# Patient Record
Sex: FEMALE | Race: OTHER | Employment: OTHER | URBAN - METROPOLITAN AREA
[De-identification: names, ages, dates, MRNs, and addresses within clinical notes are randomized per-mention and may not be internally consistent; named-entity substitution may affect disease eponyms.]

---

## 2017-03-17 ENCOUNTER — HOSPITAL ENCOUNTER (OUTPATIENT)
Dept: LAB | Age: 72
Discharge: HOME | End: 2017-03-17
Payer: MEDICARE

## 2017-03-17 LAB
ALBUMIN SERPL BCP-MCNC: 3.4 G/DL (ref 3.4–5)
ALBUMIN/GLOB SERPL: 0.8 {RATIO} (ref 0.8–1.7)
ALP SERPL-CCNC: 90 U/L (ref 45–117)
ALT SERPL-CCNC: 21 U/L (ref 13–56)
ANION GAP BLD CALC-SCNC: 7 MMOL/L (ref 3–18)
AST SERPL W P-5'-P-CCNC: 13 U/L (ref 15–37)
BILIRUB SERPL-MCNC: 0.5 MG/DL (ref 0.2–1)
BUN SERPL-MCNC: 12 MG/DL (ref 7–18)
BUN/CREAT SERPL: 15 (ref 12–20)
CALCIUM SERPL-MCNC: 8.8 MG/DL (ref 8.5–10.1)
CHLORIDE SERPL-SCNC: 103 MMOL/L (ref 100–108)
CHOLEST SERPL-MCNC: 217 MG/DL
CO2 SERPL-SCNC: 31 MMOL/L (ref 21–32)
CREAT SERPL-MCNC: 0.78 MG/DL (ref 0.6–1.3)
GLOBULIN SER CALC-MCNC: 4.3 G/DL (ref 2–4)
GLUCOSE SERPL-MCNC: 105 MG/DL (ref 74–99)
HBA1C MFR BLD: 5.9 % (ref 4.2–5.6)
HDLC SERPL-MCNC: 48 MG/DL (ref 40–60)
HDLC SERPL: 4.5 {RATIO} (ref 0–5)
LDLC SERPL CALC-MCNC: 141.4 MG/DL (ref 0–100)
LIPID PROFILE,FLP: ABNORMAL
POTASSIUM SERPL-SCNC: 4.2 MMOL/L (ref 3.5–5.5)
PROT SERPL-MCNC: 7.7 G/DL (ref 6.4–8.2)
SODIUM SERPL-SCNC: 141 MMOL/L (ref 136–145)
TRIGL SERPL-MCNC: 138 MG/DL (ref ?–150)
VLDLC SERPL CALC-MCNC: 27.6 MG/DL

## 2017-03-17 PROCEDURE — 83036 HEMOGLOBIN GLYCOSYLATED A1C: CPT | Performed by: FAMILY MEDICINE

## 2017-03-17 PROCEDURE — 80061 LIPID PANEL: CPT | Performed by: FAMILY MEDICINE

## 2017-03-17 PROCEDURE — 80053 COMPREHEN METABOLIC PANEL: CPT | Performed by: FAMILY MEDICINE

## 2017-03-17 PROCEDURE — 36415 COLL VENOUS BLD VENIPUNCTURE: CPT | Performed by: FAMILY MEDICINE

## 2017-03-27 ENCOUNTER — OFFICE VISIT (OUTPATIENT)
Dept: FAMILY MEDICINE CLINIC | Age: 72
End: 2017-03-27

## 2017-03-27 VITALS
OXYGEN SATURATION: 96 % | SYSTOLIC BLOOD PRESSURE: 134 MMHG | HEART RATE: 62 BPM | HEIGHT: 59 IN | RESPIRATION RATE: 16 BRPM | BODY MASS INDEX: 39.51 KG/M2 | TEMPERATURE: 96 F | DIASTOLIC BLOOD PRESSURE: 88 MMHG | WEIGHT: 196 LBS

## 2017-03-27 DIAGNOSIS — M25.512 CHRONIC LEFT SHOULDER PAIN: ICD-10-CM

## 2017-03-27 DIAGNOSIS — I10 ESSENTIAL HYPERTENSION: ICD-10-CM

## 2017-03-27 DIAGNOSIS — Z13.39 SCREENING FOR ALCOHOLISM: ICD-10-CM

## 2017-03-27 DIAGNOSIS — M79.18 RHOMBOID PAIN: ICD-10-CM

## 2017-03-27 DIAGNOSIS — Z71.89 ADVANCE DIRECTIVE DISCUSSED WITH PATIENT: ICD-10-CM

## 2017-03-27 DIAGNOSIS — M85.80 OSTEOPENIA: Primary | ICD-10-CM

## 2017-03-27 DIAGNOSIS — Z00.00 ROUTINE GENERAL MEDICAL EXAMINATION AT A HEALTH CARE FACILITY: ICD-10-CM

## 2017-03-27 DIAGNOSIS — G89.29 CHRONIC LEFT SHOULDER PAIN: ICD-10-CM

## 2017-03-27 RX ORDER — CYCLOBENZAPRINE HCL 10 MG
5 TABLET ORAL
Qty: 30 TAB | Refills: 0 | Status: SHIPPED | OUTPATIENT
Start: 2017-03-27 | End: 2017-10-28

## 2017-03-27 RX ORDER — SUCRALFATE 1 G/1
TABLET ORAL
COMMUNITY
Start: 2017-03-15 | End: 2020-10-05

## 2017-03-27 NOTE — PROGRESS NOTES
This is a Subsequent Medicare Annual Wellness Visit providing Personalized Prevention Plan Services (PPPS) (Performed 12 months after initial AWV and PPPS )    I have reviewed the patient's medical history in detail and updated the computerized patient record. History     Past Medical History:   Diagnosis Date    Alpha thalassemia ([address] 2/15/2012    Anxiety     Breast tumor     benign cyst    Carotid stenosis, left     LICA <42%    Esophageal stricture     dr. Giacomo Leonardo, s/p balloon dilation 9/2016    Gastric polyps     GERD (gastroesophageal reflux disease)     endoscopy 7/08- hiatal hernia/gastric polyps, esophagitis 9/2016 on prilosec/carafate    Headache(784.0)     Hypertension     nuclear stress test neg 1/2009    Hypertensive retinopathy     IFG (impaired fasting glucose)     Osteopenia       Past Surgical History:   Procedure Laterality Date    HX BREAST BIOPSY  1978    bilateral breasts    HX HYSTERECTOMY       Current Outpatient Prescriptions   Medication Sig Dispense Refill    sucralfate (CARAFATE) 1 gram tablet       cyclobenzaprine (FLEXERIL) 10 mg tablet Take 0.5 Tabs by mouth three (3) times daily as needed for Muscle Spasm(s). 30 Tab 0    glucose blood VI test strips (BLOOD GLUCOSE TEST) strip DX: E11.9 DM check fasting blood glucose once daily 200 Strip 2    hydroCHLOROthiazide (HYDRODIURIL) 25 mg tablet Take 1 Tab by mouth daily. 90 Tab 3    albuterol (PROVENTIL HFA, VENTOLIN HFA, PROAIR HFA) 90 mcg/actuation inhaler Take 2 Puffs by inhalation every six (6) hours as needed for Wheezing. 1 Inhaler 0    naproxen (NAPROSYN) 500 mg tablet Take 1 Tab by mouth two (2) times daily (with meals). 30 Tab 0    cetirizine (ZYRTEC) 10 mg tablet Take 1 Tab by mouth daily. 30 Tab 0    glucose blood VI test strips (ASCENSIA AUTODISC VI, ONE TOUCH ULTRA TEST VI) strip One Touch Ultra Use daily for blood glucose monitoring.  DX: E11.9 3 Package 3    Blood-Glucose Meter (ONETOUCH ULTRAMINI) monitoring kit Use daily for glucose monitoring DX E11.9 1 Kit 0    Lancets misc Check blood sugars daily. Gillian Fraga. Dx: E11.9 100 Each 3    omeprazole (PRILOSEC) 20 mg capsule       fluticasone (FLONASE) 50 mcg/actuation nasal spray       omega-3 fatty acids-vitamin e (FISH OIL) 1,000 mg Cap Take 1 Cap by mouth daily.  multivitamin (ONE A DAY) tablet Take 1 Tab by mouth daily. Allergies   Allergen Reactions    Bystolic [Nebivolol] Other (comments)     Leg cramps    Norvasc [Amlodipine] Swelling    Pravachol [Pravastatin] Myalgia     Family History   Problem Relation Age of Onset    Cancer Mother      Social History   Substance Use Topics    Smoking status: Never Smoker    Smokeless tobacco: Never Used    Alcohol use No      Comment: \"Often\"     Patient Active Problem List   Diagnosis Code    IFG (impaired fasting glucose) R73.01    Hyperlipidemia E78.5    GERD (gastroesophageal reflux disease) K21.9    Alpha thalassemia (HCC) D56.0    Obesity E66.9    Carotid stenosis, left I65.22    Osteopenia M85.80    Anxiety F41.9    Hypertensive retinopathy H35.039    Essential hypertension I10    Advance directive discussed with patient-AMOSCLIFFORD Ish Patel Z71.89       Depression Risk Factor Screening:     PHQ 2 / 9, over the last two weeks 3/27/2017   Little interest or pleasure in doing things Not at all   Feeling down, depressed or hopeless Not at all   Total Score PHQ 2 0     Alcohol Risk Factor Screening:   None per patient      Functional Ability and Level of Safety:     Hearing Loss   none    Activities of Daily Living   Self-care. Requires assistance with: no ADLs    Fall Risk     Fall Risk Assessment, last 12 mths 3/27/2017   Able to walk? Yes   Fall in past 12 months? No     Abuse Screen   Patient is not abused    Review of Systems   Pertinent items are noted in HPI.     Physical Examination     Evaluation of Cognitive Function:  Mood/affect:  happy  Appearance: age appropriate and casually dressed  Family member/caregiver input:  states that she is currently having problems with left shoulder. Patient Care Team:  Roberto Recinos MD as PCP - General (Family Practice)  Elysia Fan MD (Surgery)  [facility], MD (Physical Medicine and Rehab)  Reji Huff MD as Surgeon (Surgical Oncology)  Johan Michele MD (Orthopedic Surgery)  Pancho Chisholm DO (Optometry)  Immanuel Arauz MD (Gastroenterology)    Advice/Referrals/Counseling   Education and counseling provided:  Are appropriate based on today's review and evaluation  End-of-Life planning (with patient's consent)-discussed, provided form  Screening Mammography-due 10/17, will order on next visit. Bone mass measurement (DEXA) due 10/17, will order on next visit      Jose Davidson, 66 y.o.,  female    SUBJECTIVE  Routine ff-up    HTN- compliant with HCTZ, says BP readings are higher in office than home readings 110-120/60's. We have compared this with home BP in the past and monitor at home is accurate. HL- says intolerant to lipitor/pravachol pt reports muscle aches. Says follows healthy diet. She is sedentary. Reviewed labs    Prediabetes- checks her sugars 90- 100's. Says tries to follow diet      GERD- per pt doing well, no heartburn on prilosec. Previous EGD dilation and dysphagia has improved. C/o L shoulder/upper back pain on and off for years. Says good ROM, has tried tylenol and naprosyn without much relief. cxr 4/16 normal.  No pattern with movement, position. Wants to see ortho.      ROS:  See HPI, all others negative        Patient Active Problem List   Diagnosis Code    IFG (impaired fasting glucose) R73.01    Hyperlipidemia E78.5    GERD (gastroesophageal reflux disease) K21.9    Alpha thalassemia (HCC) D56.0    Obesity E66.9    Carotid stenosis, left I65.22    Osteopenia M85.80    Anxiety F41.9    Hypertensive retinopathy H35.039    Essential hypertension I10    Advance directive discussed with patient-DARIUSZ Iqbal Challenger Z71.89       Current Outpatient Prescriptions   Medication Sig Dispense Refill    sucralfate (CARAFATE) 1 gram tablet       cyclobenzaprine (FLEXERIL) 10 mg tablet Take 0.5 Tabs by mouth three (3) times daily as needed for Muscle Spasm(s). 30 Tab 0    glucose blood VI test strips (BLOOD GLUCOSE TEST) strip DX: E11.9 DM check fasting blood glucose once daily 200 Strip 2    hydroCHLOROthiazide (HYDRODIURIL) 25 mg tablet Take 1 Tab by mouth daily. 90 Tab 3    albuterol (PROVENTIL HFA, VENTOLIN HFA, PROAIR HFA) 90 mcg/actuation inhaler Take 2 Puffs by inhalation every six (6) hours as needed for Wheezing. 1 Inhaler 0    naproxen (NAPROSYN) 500 mg tablet Take 1 Tab by mouth two (2) times daily (with meals). 30 Tab 0    cetirizine (ZYRTEC) 10 mg tablet Take 1 Tab by mouth daily. 30 Tab 0    glucose blood VI test strips (ASCENSIA AUTODISC VI, ONE TOUCH ULTRA TEST VI) strip One Touch Ultra Use daily for blood glucose monitoring. DX: E11.9 3 Package 3    Blood-Glucose Meter (ONETOUCH ULTRAMINI) monitoring kit Use daily for glucose monitoring DX E11.9 1 Kit 0    Lancets misc Check blood sugars daily. Pedro Luis Martinez. Dx: E11.9 100 Each 3    omeprazole (PRILOSEC) 20 mg capsule       fluticasone (FLONASE) 50 mcg/actuation nasal spray       omega-3 fatty acids-vitamin e (FISH OIL) 1,000 mg Cap Take 1 Cap by mouth daily.  multivitamin (ONE A DAY) tablet Take 1 Tab by mouth daily.            Allergies   Allergen Reactions    Bystolic [Nebivolol] Other (comments)     Leg cramps    Norvasc [Amlodipine] Swelling    Pravachol [Pravastatin] Myalgia       Past Medical History:   Diagnosis Date    Alpha thalassemia ([facility] [address] [address] 2/15/2012    Anxiety     Breast tumor     benign cyst    Carotid stenosis, left     LICA <43%    Esophageal stricture     dr. Lonny Jewell, s/p balloon dilation 9/2016    Gastric polyps     GERD (gastroesophageal reflux disease)     endoscopy 7/08- hiatal hernia/gastric polyps, esophagitis 9/2016 on prilosec/carafate    Headache(784.0)     Hypertension     nuclear stress test neg 1/2009    Hypertensive retinopathy     IFG (impaired fasting glucose)     Osteopenia        Social History     Social History    Marital status:      Spouse name: N/A    Number of children: N/A    Years of education: N/A     Occupational History    Not on file. Social History Main Topics    Smoking status: Never Smoker    Smokeless tobacco: Never Used    Alcohol use No      Comment: \"Often\"    Drug use: No    Sexual activity: Yes     Partners: Male     Other Topics Concern    Not on file     Social History Narrative       Family History   Problem Relation Age of Onset    Cancer Mother          OBJECTIVE    Physical Exam:     Visit Vitals    /88    Pulse 62    Temp 96 °F (35.6 °C) (Oral)    Resp 16    Ht 4' 10.5\" (1.486 m)    Wt 196 lb (88.9 kg)    SpO2 96%    BMI 40.27 kg/m2       General: alert, n no apparent distress or pain  Breasts: breasts appear normal, no suspicious masses, no skin or nipple changes or axillary nodes.   CVS: normal rate, regular rhythm, distinct S1 and S2  Lungs:clear to ausculation bilaterally, no crackles, wheezing or rhonchi noted  Abdomen: normoactive bowel sounds, soft, non-tender  Ext: full ROM, UE motor, sensation intact  Skin: warm, no lesions, rashes noted  Psych:  mood and affect normal    Results for orders placed or performed during the hospital encounter of 03/17/17   LIPID PANEL   Result Value Ref Range    LIPID PROFILE          Cholesterol, total 217 (H) <200 MG/DL    Triglyceride 138 <150 MG/DL    HDL Cholesterol 48 40 - 60 MG/DL    LDL, calculated 141.4 (H) 0 - 100 MG/DL    VLDL, calculated 27.6 MG/DL    CHOL/HDL Ratio 4.5 0 - 5.0     METABOLIC PANEL, COMPREHENSIVE   Result Value Ref Range    Sodium 141 136 - 145 mmol/L    Potassium 4.2 3.5 - 5.5 mmol/L    Chloride 103 100 - 108 mmol/L    CO2 31 21 - 32 mmol/L    Anion gap 7 3.0 - 18 mmol/L    Glucose 105 (H) 74 - 99 mg/dL    BUN 12 7.0 - 18 MG/DL    Creatinine 0.78 0.6 - 1.3 MG/DL    BUN/Creatinine ratio 15 12 - 20      GFR est AA >60 >60 ml/min/1.73m2    GFR est non-AA >60 >60 ml/min/1.73m2    Calcium 8.8 8.5 - 10.1 MG/DL    Bilirubin, total 0.5 0.2 - 1.0 MG/DL    ALT (SGPT) 21 13 - 56 U/L    AST (SGOT) 13 (L) 15 - 37 U/L    Alk. phosphatase 90 45 - 117 U/L    Protein, total 7.7 6.4 - 8.2 g/dL    Albumin 3.4 3.4 - 5.0 g/dL    Globulin 4.3 (H) 2.0 - 4.0 g/dL    A-G Ratio 0.8 0.8 - 1.7     HEMOGLOBIN A1C W/O EAG   Result Value Ref Range    Hemoglobin A1c 5.9 (H) 4.2 - 5.6 %         ASSESSMENT/PLAN  Bertha was seen today for hypertension, cholesterol problem, other and annual wellness visit. Diagnoses and associated orders for this visit:    Prediabetes-   a1c 5.8>6.2>5.8>5.9, cont TLCs, monitoring    Hyperlipidemia- intolerant to lipitor/pravachol , calculated 10 yr cv risk 16%,   She does not want to try another statin, advised low fat diet    Essential hypertension-  elevated today, improved with recheck. pt does white coat phenomenon, will cont to monitor  Monitoring, cont HCTZ, i advised against decreasing dose    Osteopenia-  update 10/2017, cont vit D/ Ca RDA  Will order on next visit    GERD-   stable on PPI    Left shoulder pain  Referral to ortho  Trial flexeril for possible rhomdoid strain    Upper back pain      Follow-up Disposition:  Return in about 8 months (around 11/27/2017). Patient understands plan of care. Patient has provided input and agrees with goals.

## 2017-03-27 NOTE — MR AVS SNAPSHOT
Visit Information Union Archie sajan Potter Personal Médico Departamento Teléfono del Dep. Número de visita 3/27/2017 10:30 AM Maria Fernanda Mckenzie, [address] [phone] Follow-up Instructions Return in about 8 months (around 11/27/2017). Upcoming Health Maintenance Date Due  
 GLAUCOMA SCREENING Q2Y 12/7/2017 MEDICARE YEARLY EXAM 3/28/2018 BREAST CANCER SCRN MAMMOGRAM 10/24/2018 COLONOSCOPY 1/1/2021 DTaP/Tdap/Td series (2 - Td) 9/26/2026 Alergias  Review Complete El: 3/27/2017 Por: MD Osiris Palacios del:  3/27/2017 Intensidad Anotado Tipo de reacción [employer] Bystolic [Nebivolol]  38/21/6689    Other (comments) Leg cramps Norvasc [Amlodipine]  02/15/2012    Swelling Pravachol [Pravastatin]  05/03/2016   Side Effect Myalgia Vacunas actuales Revisadas el:  9/26/2016 Lutricia Cancel Influenza High Dose Vaccine PF 9/26/2016 Influenza Vaccine 10/14/2014, 10/29/2013 Influenza Vaccine (Quad) PF 10/19/2015 Influenza Vaccine Split 10/2/2012 Pneumococcal Conjugate (PCV-13) 10/19/2015 Pneumococcal Vaccine (Unspecified Type) 1/1/2010 Zoster Vaccine, Live 1/1/2010 No revisadas esta visita You Were Diagnosed With   
  
 Erwin Izquierdo Essential hypertension    -  Primary ICD-10-CM: I10 
ICD-9-CM: 401.9 Routine general medical examination at a health care facility     ICD-10-CM: Z00.00 ICD-9-CM: V70.0 Screening for alcoholism     ICD-10-CM: Z13.89 ICD-9-CM: V79.1 Osteopenia     ICD-10-CM: M85.80 ICD-9-CM: 733.90 Advance directive discussed with patient     ICD-10-CM: Z71.89 ICD-9-CM: V65.49 Chronic left shoulder pain     ICD-10-CM: M25.512, G89.29 ICD-9-CM: 719.41, 338.29 Rhomboid pain     ICD-10-CM: M79.1 ICD-9-CM: 729.1 Partes vitales PS Pulso Temperatura Resp [place] ( percentil de crecimiento) Peso (percentil de crecimiento) 134/88 62 96 °F (35.6 °C) (Oral) 16 4' 10.5\" (1.486 m) 196 lb (88.9 kg) SpO2 BMI (IMC) Estado obstétrico Estatus de tabaquísmo 96% 40.27 kg/m2 Hysterectomy Never Smoker Historial de signos vitales BMI and BSA Data Body Mass Index Body Surface Area  
 40.27 kg/m 2 1.92 m 2 Kehinde Cohen Pharmacy Name [facility] [address] [place] [address] [phone] Woody lista de medicamentos actualizada Lista actualizada el: 3/27/17 11:15 AM.  Jimbo Sonido use woody lista de medicamentos más reciente. albuterol 90 mcg/actuation inhaler También conocido will:  PROVENTIL HFA, VENTOLIN HFA, PROAIR HFA Take 2 Puffs by inhalation every six (6) hours as needed for Wheezing. Blood-Glucose Meter monitoring kit También conocido will:  Roger Mcardle Use daily for glucose monitoring DX E11.9  
  
 cetirizine 10 mg tablet También conocido will:  ZyrTEC Take 1 Tab by mouth daily. cyclobenzaprine 10 mg tablet También conocido will:  FLEXERIL Take 0.5 Tabs by mouth three (3) times daily as needed for Muscle Spasm(s). FISH OIL 1,000 mg Cap Medicamento genérico:  omega-3 fatty acids-vitamin e Take 1 Cap by mouth daily. fluticasone 50 mcg/actuation nasal spray También conocido will:  FLONASE  
  
 * glucose blood VI test strips strip También conocido will:  ASCENSIA AUTODISC VI, ONE TOUCH ULTRA TEST VI One Touch Ultra Use daily for blood glucose monitoring. DX: E11.9  
  
 * glucose blood VI test strips strip También conocido will:  blood glucose test  
DX: E11.9 DM check fasting blood glucose once daily  
  
 hydroCHLOROthiazide 25 mg tablet También conocido will:  HYDRODIURIL Take 1 Tab by mouth daily. Lancets Misc Check blood sugars daily. Blayne Everett. Dx: E11.9  
  
 multivitamin tablet También conocido will:  ONE A DAY Take 1 Tab by mouth daily. naproxen 500 mg tablet También conocido will:  NAPROSYN Take 1 Tab by mouth two (2) times daily (with meals). omeprazole 20 mg capsule También conocido will:  PRILOSEC  
  
 sucralfate 1 gram tablet También conocido will:  Kenneth Avers * Aviso:  Esta lista contiene medicamentos que son iguales a otros medicamentos recetados para usted. Mary las instrucciones con cuidado y pida a kaet personal médico que revise la lista de medicamentos y las instrucciones correspondientes con usted. Recetas Enviado a la Shara Refills  
 cyclobenzaprine (FLEXERIL) 10 mg tablet 0 Sig: Take 0.5 Tabs by mouth three (3) times daily as needed for Muscle Spasm(s). Class: Normal  
 Pharmacy: [address] [place] [address] #: [phone] Route: Oral  
  
Hicimos lo siguiente ADVANCE CARE PLANNING FIRST 30 MINS [62425 CPT(R)] REFERRAL TO ORTHOPEDIC SURGERY [REF62 Custom] Instrucciones de seguimiento Return in about 8 months (around 11/27/2017). Informacion de [employer] de Referencia Referido por Referido a  
  
 [id number] CHITO EASTON STEVEN C   
   [address] [facility]   
   [address] [facility], [address] Phone: [phone] Fax: [phone] Visitas [place] de inicio [place] final  
 1 New Request 3/27/17 3/27/18 Si kate referencia tiene un estado de \"pending review\" o \"denied\" , informacion adicional sera enviada para apoyar el resultado de esta decision. Instrucciones para el Paciente Medicare Part B Preventive Services Limitations Recommendation Scheduled Bone Mass Measurement 
(age 72 & older, biennial) Requires diagnosis related to osteoporosis or estrogen deficiency. Biennial benefit unless patient has history of long-term glucocorticoid tx or baseline is needed because initial test was by other method 10/28/15 Cardiovascular Screening Blood Tests (every 5 years) Total cholesterol, HDL, Triglycerides Order as a panel if possible 03/17/17 Colorectal Cancer Screening 
-Fecal occult blood test (annual) -Flexible sigmoidoscopy (5y) 
-Screening colonoscopy (10y) -Barium Enema  2011 Q10 Counseling to Prevent Tobacco Use (up to 8 sessions per year) - Counseling greater than 3 and up to 10 minutes - Counseling greater than 10 minutes Patients must be asymptomatic of tobacco-related conditions to receive as preventive service Not applicable Diabetes Screening Tests (at least every 3 years, Medicare covers annually or at 6-month intervals for prediabetic patients) Fasting blood sugar (FBS) or glucose tolerance test (GTT) Patient must be diagnosed with one of the following: 
-Hypertension, Dyslipidemia, obesity, previous impaired FBS or GTT 
Or any two of the following: overweight, FH of diabetes, age ? 72, history of gestational diabetes, birth of baby weighing more than 9 pounds 03/17/17 Diabetes Self-Management Training (DSMT) (no USPSTF recommendation) Requires referral by treating physician for patient with diabetes or renal disease. 10 hours of initial DSMT session of no less than 30 minutes each in a continuous 12-month period. 2 hours of follow-up DSMT in subsequent years. Not applicable Glaucoma Screening (no USPSTF recommendation) Diabetes mellitus, family history, , age 48 or over,  American, age 72 or over 12/7/18 Human Immunodeficiency Virus (HIV) Screening (annually for increased risk patients) HIV-1 and HIV-2 by EIA, YESENIA, rapid antibody test, or oral mucosa transudate Patient must be at increased risk for HIV infection per USPSTF guidelines or pregnant. Tests covered annually for patients at increased risk. Pregnant patients may receive up to 3 test during pregnancy. Not applicable Medical Nutrition Therapy (MNT) (for diabetes or renal disease not recommended schedule) Requires referral by treating physician for patient with diabetes or renal disease. Can be provided in same year as diabetes self-management training (DSMT), and CMS recommends medical nutrition therapy take place after DSMT. Up to 3 hours for initial year and 2 hours in subsequent years. Not applicable Shingles Vaccination A shingles vaccine is also recommended once in a lifetime after age 57 36 Seasonal Influenza Vaccination (annually)  9/26/16 Pneumococcal Vaccination (once after 65)  2010 & 2015 Hepatitis B Vaccinations (if medium/high risk) Medium/high risk factors:  End-stage renal disease, Hemophiliacs who received Factor VIII or IX concentrates, Clients of institutions for the mentally retarded, Persons who live in the same house as a HepB virus carrier, Homosexual men, Illicit injectable drug abusers. Not applicable Screening Mammography (biennial age 54-69) Annually (age 36 or over) 10/24/16 Screening Pap Tests and Pelvic Examination (up to age 79 and after 79 if unknown history or abnormal study last 10 years) Every 24 months except high risk Not applicable Ultrasound Screening for Abdominal Aortic Aneurysm (AAA) (once) Patient must be referred through IPPE and not have had a screening for abdominal aortic aneurysm before under Medicare. Limited to patients who meet one of the following criteria: 
- Men who are 73-68 years old and have smoked more than 100 cigarettes in their lifetime. 
-Anyone with a FH of AAA 
-Anyone recommended for screening by USPSTF Not applicable Introducing hospitals & HEALTH SERVICES! [facility] introduce portal paciente MyChart . Ahora se puede acceder a partes de kate expediente médico, enviar por correo electrónico la oficina de kate médico y solicitar renovaciones de medicamentos en línea. En kate navegador de Internet , Anais Dain a [email or web address] Tatyana clic en el usuario por [place] Maggie? Raymon Coles clic aquí en la sesión [facility]. Verá la página de registro [place]. Ingrese kate código de [employer] alhaji y will aparece a continuación. Usted no tendrá que [employer] código después de darshana completado el proceso de registro . Si usted no se inscribe antes de la fecha de caducidad , debe solicitar un nuevo código. · MyChart Código de acceso : [id number] Expires: 6/15/2017  8:59 AM 
 
Ingresa los últimos cuatro dígitos de kate Número de Seguro Social ( xxxx ) y fecha de nacimiento ( dd / mm / aaaa ) will se indica y tatyana clic en Enviar. Usted será llevado a la siguiente página de registro . Crear un ID MyChart . Esta será kate ID de inicio de sesión de MyChart y no puede ser [place] , por lo que pensar en deandre que es Roselee Nila y fácil de recordar . Crear deandre contraseña MyChart . Usted puede cambiar kate contraseña en cualquier momento . Ingrese kate Password Reset de preguntas y Menjivar . [place] se puede utilizar en un momento posterior si usted olvida kate contraseña. Introduzca kate dirección de correo electrónico . Chantale Cruz recibirá deandre notificación por correo electrónico cuando la nueva información está disponible en MyChart . Soler Heike clic en Registrarse. Tiffany Chaudhary joshua y descargar porciones de kate expediente médico. 
Tatyana clic en el enlace de descarga del menú Resumen para descargar deandre copia portátil de kate información médica . Si tiene [employer] , por favor visite la sección de preguntas frecuentes del sitio web MyChart . Recuerde, MyChart NO es que se utilizará para las necesidades urgentes. Para emergencias médicas , llame al 911 . Ahora disponible en kate iPhone y Android ! Por favor proporcione rasheed resumen de la documentación de cuidado a kate próximo proveedor. Your primary care clinician is listed as Vicki Mcleod. If you have any questions after today's visit, please call [phone].

## 2017-03-27 NOTE — ACP (ADVANCE CARE PLANNING)
Advance Care Planning (ACP) Provider Conversation Snapshot    Date of ACP Conversation: 03/27/17  Persons included in Conversation:  patient and POA  Length of ACP Conversation in minutes:  16 minutes    Authorized Decision Maker (if patient is incapable of making informed decisions): This person is:   Healthcare Agent/Medical Power of  under Advance Directive          For Patients with Decision Making Capacity:   Values/Goals: Exploration of values, goals, and preferences if recovery is not expected, even with continued medical treatment in the event of:  Imminent death  Severe, permanent brain injury  \"In these circumstances, what matters most to you? \"  Care focused more on comfort or quality of life.     Conversation Outcomes / Follow-Up Plan:   Recommended completion of Advance Directive form after review of ACP materials and conversation with prospective healthcare agent   Recommended communicating the plan and making copies for the healthcare agent, personal physician, and others as appropriate (e.g., health system)  Recommended review of completed ACP document annually or upon change in health status

## 2017-03-27 NOTE — PATIENT INSTRUCTIONS
Medicare Part B Preventive Services Limitations Recommendation Scheduled   Bone Mass Measurement  (age 72 & older, biennial) Requires diagnosis related to osteoporosis or estrogen deficiency. Biennial benefit unless patient has history of long-term glucocorticoid tx or baseline is needed because initial test was by other method 10/28/15    Cardiovascular Screening Blood Tests (every 5 years)  Total cholesterol, HDL, Triglycerides Order as a panel if possible 03/17/17    Colorectal Cancer Screening  -Fecal occult blood test (annual)  -Flexible sigmoidoscopy (5y)  -Screening colonoscopy (10y)  -Barium Enema  2011 Q10   Counseling to Prevent Tobacco Use (up to 8 sessions per year)  - Counseling greater than 3 and up to 10 minutes  - Counseling greater than 10 minutes Patients must be asymptomatic of tobacco-related conditions to receive as preventive service Not applicable    Diabetes Screening Tests (at least every 3 years, Medicare covers annually or at 6-month intervals for prediabetic patients)    Fasting blood sugar (FBS) or glucose tolerance test (GTT) Patient must be diagnosed with one of the following:  -Hypertension, Dyslipidemia, obesity, previous impaired FBS or GTT  Or any two of the following: overweight, FH of diabetes, age ? 72, history of gestational diabetes, birth of baby weighing more than 9 pounds 03/17/17    Diabetes Self-Management Training (DSMT) (no USPSTF recommendation) Requires referral by treating physician for patient with diabetes or renal disease. 10 hours of initial DSMT session of no less than 30 minutes each in a continuous 12-month period. 2 hours of follow-up DSMT in subsequent years.  Not applicable    Glaucoma Screening (no USPSTF recommendation) Diabetes mellitus, family history, , age 48 or over,  American, age 72 or over 12/7/18    Human Immunodeficiency Virus (HIV) Screening (annually for increased risk patients)  HIV-1 and HIV-2 by EIA, YESENIA, rapid antibody test, or oral mucosa transudate Patient must be at increased risk for HIV infection per USPSTF guidelines or pregnant. Tests covered annually for patients at increased risk. Pregnant patients may receive up to 3 test during pregnancy. Not applicable    Medical Nutrition Therapy (MNT) (for diabetes or renal disease not recommended schedule) Requires referral by treating physician for patient with diabetes or renal disease. Can be provided in same year as diabetes self-management training (DSMT), and CMS recommends medical nutrition therapy take place after DSMT. Up to 3 hours for initial year and 2 hours in subsequent years. Not applicable    Shingles Vaccination A shingles vaccine is also recommended once in a lifetime after age 57 36    Seasonal Influenza Vaccination (annually)  9/26/16    Pneumococcal Vaccination (once after 72)  2010 & 2015    Hepatitis B Vaccinations (if medium/high risk) Medium/high risk factors:  End-stage renal disease,  Hemophiliacs who received Factor VIII or IX concentrates, Clients of institutions for the mentally retarded, Persons who live in the same house as a HepB virus carrier, Homosexual men, Illicit injectable drug abusers. Not applicable    Screening Mammography (biennial age 54-69) Annually (age 36 or over) 10/24/16    Screening Pap Tests and Pelvic Examination (up to age 79 and after 79 if unknown history or abnormal study last 10 years) Every 25 months except high risk Not applicable    Ultrasound Screening for Abdominal Aortic Aneurysm (AAA) (once) Patient must be referred through IPPE and not have had a screening for abdominal aortic aneurysm before under Medicare.   Limited to patients who meet one of the following criteria:  - Men who are 73-68 years old and have smoked more than 100 cigarettes in their lifetime.  -Anyone with a FH of AAA  -Anyone recommended for screening by USPSTF Not applicable

## 2017-04-13 RX ORDER — LANCETS 33 GAUGE
EACH MISCELLANEOUS
Qty: 100 LANCET | Refills: 3 | Status: SHIPPED | OUTPATIENT
Start: 2017-04-13 | End: 2021-06-03

## 2017-05-10 ENCOUNTER — OFFICE VISIT (OUTPATIENT)
Dept: ORTHOPEDIC SURGERY | Age: 72
End: 2017-05-10

## 2017-05-10 VITALS
TEMPERATURE: 98.3 F | BODY MASS INDEX: 39.72 KG/M2 | SYSTOLIC BLOOD PRESSURE: 166 MMHG | WEIGHT: 197 LBS | DIASTOLIC BLOOD PRESSURE: 82 MMHG | HEIGHT: 59 IN | HEART RATE: 73 BPM

## 2017-05-10 DIAGNOSIS — M79.10 MYALGIA: Primary | ICD-10-CM

## 2017-05-10 DIAGNOSIS — M25.512 LEFT SHOULDER PAIN, UNSPECIFIED CHRONICITY: ICD-10-CM

## 2017-05-10 DIAGNOSIS — M54.12 CERVICAL RADICULOPATHY: ICD-10-CM

## 2017-05-10 DIAGNOSIS — K21.9 GASTROESOPHAGEAL REFLUX DISEASE, ESOPHAGITIS PRESENCE NOT SPECIFIED: ICD-10-CM

## 2017-05-10 DIAGNOSIS — M54.6 LEFT-SIDED THORACIC BACK PAIN, UNSPECIFIED CHRONICITY: ICD-10-CM

## 2017-05-10 DIAGNOSIS — M54.2 NECK PAIN: ICD-10-CM

## 2017-05-10 RX ORDER — BUPIVACAINE HYDROCHLORIDE 2.5 MG/ML
4 INJECTION, SOLUTION EPIDURAL; INFILTRATION; INTRACAUDAL ONCE
Qty: 4 ML | Refills: 0
Start: 2017-05-10 | End: 2017-05-10

## 2017-05-10 RX ORDER — BETAMETHASONE SODIUM PHOSPHATE AND BETAMETHASONE ACETATE 3; 3 MG/ML; MG/ML
3 INJECTION, SUSPENSION INTRA-ARTICULAR; INTRALESIONAL; INTRAMUSCULAR; SOFT TISSUE ONCE
Qty: 0.5 ML | Refills: 0
Start: 2017-05-10 | End: 2017-05-10

## 2017-05-10 NOTE — PATIENT INSTRUCTIONS
Learning About How to Have a Healthy Back  What causes back pain? Back pain is often caused by overuse, strain, or injury. For example, people often hurt their backs playing sports or working in the yard, being jolted in a car accident, or lifting something too heavy. Aging plays a part too. Your bones and muscles tend to lose strength as you age, which makes injury more likely. The spongy discs between the bones of the spine (vertebrae) may suffer from wear and tear and no longer provide enough cushion between the bones. A disc that bulges or breaks open (herniated disc) can press on nerves, causing back pain. In some people, back pain is the result of arthritis, broken vertebrae caused by bone loss (osteoporosis), illness, or a spine problem. Although most people have back pain at one time or another, there are steps you can take to make it less likely. How can you have a healthy back? Reduce stress on your back through good posture  Slumping or slouching alone may not cause low back pain. But after the back has been strained or injured, bad posture can make pain worse. · Sleep in a position that maintains your back's normal curves and on a mattress that feels comfortable. Sleep on your side with a pillow between your knees, or sleep on your back with a pillow under your knees. These positions can reduce strain on your back. · Stand and sit up straight. \"Good posture\" generally means your ears, shoulders, and hips are in a straight line. · If you must stand for a long time, put one foot on a stool, ledge, or box. Switch feet every now and then. · Sit in a chair that is low enough to let you place both feet flat on the floor with both knees nearly level with your hips. If your chair or desk is too high, use a footrest to raise your knees. Place a small pillow, a rolled-up towel, or a lumbar roll in the curve of your back if you need extra support.   · Try a kneeling chair, which helps tilt your hips forward. This takes pressure off your lower back. · Try sitting on an exercise ball. It can rock from side to side, which helps keep your back loose. · When driving, keep your knees nearly level with your hips. Sit straight, and drive with both hands on the steering wheel. Your arms should be in a slightly bent position. Reduce stress on your back through careful lifting  · Squat down, bending at the hips and knees only. If you need to, put one knee to the floor and extend your other knee in front of you, bent at a right angle (half kneeling). · Press your chest straight forward. This helps keep your upper back straight while keeping a slight arch in your low back. · Hold the load as close to your body as possible, at the level of your belly button (navel). · Use your feet to change direction, taking small steps. · Lead with your hips as you change direction. Keep your shoulders in line with your hips as you move. · Set down your load carefully, squatting with your knees and hips only. Exercise and stretch your back  · Do some exercise on most days of the week, if your doctor says it is okay. You can walk, run, swim, or cycle. · Stretch your back muscles. Here are a few exercises to try:  [place] on your back, and gently pull one bent knee to your chest. Put that foot back on the floor, and then pull the other knee to your chest.  ¨ Do pelvic tilts. Lie on your back with your knees bent. Tighten your stomach muscles. Pull your belly button (navel) in and up toward your ribs. You should feel like your back is pressing to the floor and your hips and pelvis are slightly lifting off the floor. Hold for 6 seconds while breathing smoothly. ¨ Sit with your back flat against a wall. · Keep your core muscles strong. The muscles of your back, belly (abdomen), and buttocks support your spine. ¨ Pull in your belly and imagine pulling your navel toward your spine. Hold this for 6 seconds, then relax.  Remember to keep breathing normally as you tense your muscles. ¨ Do curl-ups. Always do them with your knees bent. Keep your low back on the floor, and curl your shoulders toward your knees using a smooth, slow motion. Keep your arms folded across your chest. If this bothers your neck, try putting your hands behind your neck (not your head), with your elbows spread apart. ¨ Lie on your back with your knees bent and your feet flat on the floor. Tighten your belly muscles, and then push with your feet and raise your buttocks up a few inches. Hold this position 6 seconds as you continue to breathe normally, then lower yourself slowly to the floor. Repeat 8 to 12 times. ¨ If you like group exercise, try Pilates or yoga. These classes have poses that strengthen the core muscles. Lead a healthy lifestyle  · Stay at a healthy weight to avoid strain on your back. · Do not smoke. Smoking increases the risk of osteoporosis, which weakens the spine. If you need help quitting, talk to your doctor about stop-smoking programs and medicines. These can increase your chances of quitting for good. Where can you learn more? Go to http://michael-allyssa.info/. Enter L315 in the search box to learn more about \"Learning About How to Have a Healthy Back. \"  Current as of: May 23, 2016  Content Version: 11.2  © 7910-5783 [employer]. Care instructions adapted under license by [employer] (which disclaims liability or warranty for this information). If you have questions about a medical condition or this instruction, always ask your healthcare professional. [place] [address] any warranty or liability for your use of this information.

## 2017-05-10 NOTE — MR AVS SNAPSHOT
Visit Information Khang Valladares Personal Médico Departamento Teléfono del Dep. Número de visita 5/10/2017 10:50 AM Gui Bass, 6 [facility] [phone] Follow-up Instructions Return if symptoms worsen or fail to improve. Your Appointments 12/1/2017 10:30 AM  
ROUTINE CARE with Rima Matias MD  
[facility] ([facility]) Appt Note: 8 month followup [address] 706 [facility]  
os[address]ique [address] [facility][note 2] [address] [place] [address] Upcoming Health Maintenance Date Due INFLUENZA AGE 9 TO ADULT 8/1/2017 GLAUCOMA SCREENING Q2Y 12/7/2017 MEDICARE YEARLY EXAM 3/28/2018 BREAST CANCER SCRN MAMMOGRAM 10/24/2018 COLONOSCOPY 1/1/2021 DTaP/Tdap/Td series (2 - Td) 9/26/2026 Alergias  Review Complete El: 5/10/2017 Por: MD Glenda Meadows del:  5/10/2017 Intensidad Anotado Tipo de reacción [employer] Bystolic [Nebivolol]  08/49/6554    Other (comments) Leg cramps Norvasc [Amlodipine]  02/15/2012    Swelling Pravachol [Pravastatin]  05/03/2016   Side Effect Myalgia Vacunas actuales Revisadas el:  9/26/2016 Abrahan Matias Influenza High Dose Vaccine PF 9/26/2016 Influenza Vaccine 10/14/2014, 10/29/2013 Influenza Vaccine (Quad) PF 10/19/2015 Influenza Vaccine Split 10/2/2012 Pneumococcal Conjugate (PCV-13) 10/19/2015 Pneumococcal Vaccine (Unspecified Type) 1/1/2010 Zoster Vaccine, Live 1/1/2010 No revisadas esta visita You Were Diagnosed With   
  
 Jonathan Vin Myalgia    -  Primary ICD-10-CM: M79.1 ICD-9-CM: 729.1 Thoracic Left shoulder pain, unspecified chronicity     ICD-10-CM: M25.512 ICD-9-CM: 719.41 Gastroesophageal reflux disease, esophagitis presence not specified     ICD-10-CM: K21.9 ICD-9-CM: 530.81   
 BMI 40.0-44.9, adult [facility])     ICD-10-CM: Z68.41 
ICD-9-CM: V85.41 Neck pain     ICD-10-CM: M54.2 ICD-9-CM: 723.1 Cervical radiculopathy     ICD-10-CM: M54.12 
ICD-9-CM: 723.4 Left-sided thoracic back pain, unspecified chronicity     ICD-10-CM: M54.6 ICD-9-CM: 724.1 Partes vitales PS Pulso Temperatura [place] ( percentil de crecimiento) Peso (percentil de crecimiento) BMI (IMC)  
 166/82 73 98.3 °F (36.8 °C) (Oral) 4' 10.5\" (1.486 m) 197 lb (89.4 kg) 40.47 kg/m2 Estado obstétrico Estatus de tabaquísmo Hysterectomy Never Smoker BMI and BSA Data Body Mass Index Body Surface Area 40.47 kg/m 2 1.92 m 2 [facility] Name [facility] [address] [place] [address] [phone] Kate lista de medicamentos actualizada Lista actualizada el: 5/10/17 12:10 PM.  Dragan Herron use kate lista de medicamentos más reciente. albuterol 90 mcg/actuation inhaler También conocido will:  PROVENTIL HFA, VENTOLIN HFA, PROAIR HFA Take 2 Puffs by inhalation every six (6) hours as needed for Wheezing. betamethasone 6 mg/mL injection También conocido will:  CELESTONE SOLUSPAN  
0.5 mL by Intra artICUlar route once for 1 dose. Blood-Glucose Meter monitoring kit También conocido will:  Sebastien Meneses Use daily for glucose monitoring DX E11.9  
  
 bupivacaine (PF) 0.25 % (2.5 mg/mL) injection También conocido will:  MARCAINE (PF)  
4 mL by Intra artICUlar route once for 1 dose. cetirizine 10 mg tablet También conocido will:  ZyrTEC Take 1 Tab by mouth daily. cyclobenzaprine 10 mg tablet También conocido will:  FLEXERIL Take 0.5 Tabs by mouth three (3) times daily as needed for Muscle Spasm(s). FISH OIL 1,000 mg Cap Medicamento genérico:  omega-3 fatty acids-vitamin e Take 1 Cap by mouth daily. fluticasone 50 mcg/actuation nasal spray También conocido will:  FLONASE  
  
 * glucose blood VI test strips strip También conocido will:  ASCENSIA AUTODISC VI, ONE TOUCH ULTRA TEST VI One Touch Ultra Use daily for blood glucose monitoring. DX: E11.9  
  
 * glucose blood VI test strips strip También conocido will:  blood glucose test  
DX: E11.9 DM check fasting blood glucose once daily  
  
 hydroCHLOROthiazide 25 mg tablet También conocido will:  HYDRODIURIL Take 1 Tab by mouth daily. multivitamin tablet También conocido will:  ONE A DAY Take 1 Tab by mouth daily. naproxen 500 mg tablet También conocido will:  NAPROSYN Take 1 Tab by mouth two (2) times daily (with meals). omeprazole 20 mg capsule También conocido will:  [place] Prasad One Touch Delica 33 gauge Misc Medicamento genérico:  lancets USE ONE LANCET TO CHECK BLOOD SUGARS ONCE DAILY  
  
 sucralfate 1 gram tablet También conocido will:  Reggie Moore * Aviso:  Esta lista contiene medicamentos que son iguales a otros medicamentos recetados para usted. Mary las instrucciones con cuidado y pida a kate personal médico que revise la lista de medicamentos y las instrucciones correspondientes con usted. Hicimos lo siguiente AMB POC XRAY, SHOULDER; COMPLETE, 2+ [17037 CPT(R)] BETAMETHASONE ACETATE & SODIUM PHOSPHATE INJECTION 3 MG EA. [ [facility]] REFERRAL TO SPINE SURGERY [XJS077 Custom] THER/PROPH/DIAG INJECTION, SUBCUT/IM [id number] CPT(R)] Instrucciones de seguimiento Return if symptoms worsen or fail to improve. Informacion de [employer] Referencia Referido por Referido a  
  
 [id number] [facility]   
   . [facility] [address] Purnima Fabricio, [facility] [address] Phone: [phone] Fax: [phone] Visitas Estado Mana [place] de inicio Mana Fox final  
 1 New Request 5/10/17 5/10/18  Si kate referencia tiene un estado de \"pending review\" o \"denied\" , informacion adicional sera enviada para apoyar el resultado de esta decision. Instrucciones para el Paciente Learning About How to Have a Healthy Back What causes back pain? Back pain is often caused by overuse, strain, or injury. For example, people often hurt their backs playing sports or working in the yard, being jolted in a car accident, or lifting something too heavy. Aging plays a part too. Your bones and muscles tend to lose strength as you age, which makes injury more likely. The spongy discs between the bones of the spine (vertebrae) may suffer from wear and tear and no longer provide enough cushion between the bones. A disc that bulges or breaks open (herniated disc) can press on nerves, causing back pain. In some people, back pain is the result of arthritis, broken vertebrae caused by bone loss (osteoporosis), illness, or a spine problem. Although most people have back pain at one time or another, there are steps you can take to make it less likely. How can you have a healthy back? Reduce stress on your back through good posture Slumping or slouching alone may not cause low back pain. But after the back has been strained or injured, bad posture can make pain worse. · Sleep in a position that maintains your back's normal curves and on a mattress that feels comfortable. Sleep on your side with a pillow between your knees, or sleep on your back with a pillow under your knees. These positions can reduce strain on your back. · Stand and sit up straight. \"Good posture\" generally means your ears, shoulders, and hips are in a straight line. · If you must stand for a long time, put one foot on a stool, ledge, or box. Switch feet every now and then. · Sit in a chair that is low enough to let you place both feet flat on the floor with both knees nearly level with your hips. If your chair or desk is too high, use a footrest to raise your knees.  Place a small pillow, a rolled-up towel, or a lumbar roll in the curve of your back if you need extra support. · Try a kneeling chair, which helps tilt your hips forward. This takes pressure off your lower back. · Try sitting on an exercise ball. It can rock from side to side, which helps keep your back loose. · When driving, keep your knees nearly level with your hips. Sit straight, and drive with both hands on the steering wheel. Your arms should be in a slightly bent position. Reduce stress on your back through careful lifting · Squat down, bending at the hips and knees only. If you need to, put one knee to the floor and extend your other knee in front of you, bent at a right angle (half kneeling). · Press your chest straight forward. This helps keep your upper back straight while keeping a slight arch in your low back. · Hold the load as close to your body as possible, at the level of your belly button (navel). · Use your feet to change direction, taking small steps. · Lead with your hips as you change direction. Keep your shoulders in line with your hips as you move. · Set down your load carefully, squatting with your knees and hips only. Exercise and stretch your back · Do some exercise on most days of the week, if your doctor says it is okay. You can walk, run, swim, or cycle. · Stretch your back muscles. Here are a few exercises to try: ¨ Lie on your back, and gently pull one bent knee to your chest. Put that foot back on the floor, and then pull the other knee to your chest. 
¨ Do pelvic tilts. Lie on your back with your knees bent. Tighten your stomach muscles. Pull your belly button (navel) in and up toward your ribs. You should feel like your back is pressing to the floor and your hips and pelvis are slightly lifting off the floor. Hold for 6 seconds while breathing smoothly. ¨ Sit with your back flat against a wall. · Keep your core muscles strong.  The muscles of your back, belly (abdomen), and buttocks support your spine. ¨ Pull in your belly and imagine pulling your navel toward your spine. Hold this for 6 seconds, then relax. Remember to keep breathing normally as you tense your muscles. ¨ Do curl-ups. Always do them with your knees bent. Keep your low back on the floor, and curl your shoulders toward your knees using a smooth, slow motion. Keep your arms folded across your chest. If this bothers your neck, try putting your hands behind your neck (not your head), with your elbows spread apart. ¨ Lie on your back with your knees bent and your feet flat on the floor. Tighten your belly muscles, and then push with your feet and raise your buttocks up a few inches. Hold this position 6 seconds as you continue to breathe normally, then lower yourself slowly to the floor. Repeat 8 to 12 times. ¨ If you like group exercise, try Pilates or yoga. These classes have poses that strengthen the core muscles. Lead a healthy lifestyle · Stay at a healthy weight to avoid strain on your back. · Do not smoke. Smoking increases the risk of osteoporosis, which weakens the spine. If you need help quitting, talk to your doctor about stop-smoking programs and medicines. These can increase your chances of quitting for good. Where can you learn more? Go to http://michael-allyssa.info/. Enter L315 in the search box to learn more about \"Learning About How to Have a Healthy Back. \" Current as of: May 23, 2016 Content Version: 11.2 © 7704-1038 [employer]. Care instructions adapted under license by [employer] (which disclaims liability or warranty for this information). If you have questions about a medical condition or this instruction, always ask your healthcare professional. [place] [address] any warranty or liability for your use of this information. Introducing [facility]!    
  
Jet Stone : 
 Juan por solicitar deandre cuenta de MyChart usted. Nuestros registros indican que usted ya tiene deandre cuenta MyCharyenifer Bee. Usted puede acceder a kate cuenta en cualquier momento en [email or web address] Sabía usted que usted puede acceder a kate hospital y las instrucciones de pita ER en cualquier momento en MyChart ? También puede revisar [facility] de las pruebas de kate hospitalización o visita a urgencias . Información Adicional 
 
Si tiene alguna pregunta , por favor visite la sección de preguntas frecuentes del sitio web MyChart en [email or web address] . Recuerde, MyChart NO es que se utilizará para las necesidades urgentes. Para emergencias médicas , llame al 911 . Ahora disponible en kate iPhone y Android ! Por favor proporcione rasheed resumen de la documentación de cuidado a kate próximo proveedor. Your primary care clinician is listed as Elder Aguilera. If you have any questions after today's visit, please call [phone].

## 2017-05-10 NOTE — PROGRESS NOTES
Patient: Francisco Denis                MRN: [id number]       SSN: [id number]  YOB: 1945        AGE: 66 y.o. SEX: female    PCP: Jordan Napier MD  05/10/17    Chief Complaint   Patient presents with    Shoulder Pain     left     HISTORY:  Francisco Denis is a 66 y.o. female who is seen for left shoulder, neck, and upper back pain. She reports intermittent left shoulder pain for several years. She denies any previous shoulder injury or trauma. She had shoulder x rays taken by her PCP previously. She denies pain with shoulder motion. She takes NSAIDs for pain with limited benefit. Pain Assessment  5/10/2017   Location of Pain Shoulder   Location Modifiers Left   Severity of Pain 8   Quality of Pain Aching   Duration of Pain A few minutes   Frequency of Pain Several times daily   Aggravating Factors Other (Comment)   Aggravating Factors Comment nothing specific   Limiting Behavior Some   Relieving Factors Rest     Occupation, etc:  Ms. Brett eNw previously worked as a  at a [employer] in [facility]. She is right-handed. She has high blood pressure. She is not diabetic. Current weight is 196 pounds. She is 4'10.5\" tall. She primarily speaks Divehi and her  translates for her. She lives with her  in [place]. She has an adult daughter who lives in [place]. She previously lived in [facility] for over 30 years and moved to the area in 2004 when her  retired. Her  previously worked as a  at [employer] in [facility]. She is originally from [facility] and her  is originally from [facility].       Lab Results   Component Value Date/Time    Hemoglobin A1c 5.9 03/17/2017 09:18 AM     Weight Metrics 5/10/2017 3/27/2017 9/26/2016 8/15/2016 5/3/2016 4/19/2016 10/22/2015   Weight 197 lb 196 lb 193 lb 9.6 oz 194 lb 195 lb 194 lb 191 lb   BMI 40.47 kg/m2 40.27 kg/m2 39.77 kg/m2 39.86 kg/m2 40.06 kg/m2 39.85 kg/m2 39.23 kg/m2     Patient Active Problem List   Diagnosis Code    IFG (impaired fasting glucose) R73.01    Hyperlipidemia E78.5    GERD (gastroesophageal reflux disease) K21.9    Alpha thalassemia (HCC) D56.0    Obesity E66.9    Carotid stenosis, left I65.22    Osteopenia M85.80    Anxiety F41.9    Hypertensive retinopathy H35.039    Essential hypertension I10    Advance directive discussed with patient-DARIUSZ Calero Z71.89    BMI 40.0-44.9, adult ([facility] [address] Z68.41     REVIEW OF SYSTEMS: All Below are Negative except: See HPI   Constitutional: negative for fever, chills, and weight loss. Cardiovascular: negative for chest pain, claudication, leg swelling, SOB, STEWART   Gastrointestinal: Negative for pain, N/V/C/D, Blood in stool or urine, dysuria,  hematuria, incontinence, pelvic pain. Musculoskeletal: See HPI   Neurological: Negative for dizziness and weakness. Negative for headaches, Visual changes, confusion, seizures   Phychiatric/Behavioral: Negative for depression, memory loss, substance  abuse. Extremities: Negative for hair changes, rash, or skin lesion changes. Hematologic: Negative for bleeding problems, bruising, pallor or swollen lymph  nodes   Peripheral Vascular: No calf pain, no circulation deficits. Social History     Social History    Marital status:      Spouse name: N/A    Number of children: N/A    Years of education: N/A     Occupational History    Not on file.      Social History Main Topics    Smoking status: Never Smoker    Smokeless tobacco: Never Used    Alcohol use No      Comment: \"Often\"    Drug use: No    Sexual activity: Yes     Partners: Male     Other Topics Concern    Not on file     Social History Narrative      Allergies   Allergen Reactions    Bystolic [Nebivolol] Other (comments)     Leg cramps    Norvasc [Amlodipine] Swelling    Pravachol [Pravastatin] Myalgia      Current Outpatient Prescriptions Medication Sig    ONE TOUCH DELICA 33 gauge misc USE ONE LANCET TO CHECK BLOOD SUGARS ONCE DAILY    sucralfate (CARAFATE) 1 gram tablet     cyclobenzaprine (FLEXERIL) 10 mg tablet Take 0.5 Tabs by mouth three (3) times daily as needed for Muscle Spasm(s).  glucose blood VI test strips (BLOOD GLUCOSE TEST) strip DX: E11.9 DM check fasting blood glucose once daily    hydroCHLOROthiazide (HYDRODIURIL) 25 mg tablet Take 1 Tab by mouth daily.  albuterol (PROVENTIL HFA, VENTOLIN HFA, PROAIR HFA) 90 mcg/actuation inhaler Take 2 Puffs by inhalation every six (6) hours as needed for Wheezing.  cetirizine (ZYRTEC) 10 mg tablet Take 1 Tab by mouth daily.  glucose blood VI test strips (ASCENSIA AUTODISC VI, ONE TOUCH ULTRA TEST VI) strip One Touch Ultra Use daily for blood glucose monitoring. DX: E11.9    Blood-Glucose Meter (ONETOUCH ULTRAMINI) monitoring kit Use daily for glucose monitoring DX E11.9    omeprazole (PRILOSEC) 20 mg capsule     fluticasone (FLONASE) 50 mcg/actuation nasal spray     omega-3 fatty acids-vitamin e (FISH OIL) 1,000 mg Cap Take 1 Cap by mouth daily.  multivitamin (ONE A DAY) tablet Take 1 Tab by mouth daily.  naproxen (NAPROSYN) 500 mg tablet Take 1 Tab by mouth two (2) times daily (with meals). No current facility-administered medications for this visit.        PHYSICAL EXAMINATION:  Visit Vitals    /82    Pulse 73    Temp 98.3 °F (36.8 °C) (Oral)    Ht 4' 10.5\" (1.486 m)    Wt 197 lb (89.4 kg)    BMI 40.47 kg/m2      ORTHO EXAMINATION:  Examination Neck   Skin Intact   Tenderness +, left cervical trapezius   Tightness +, left cervical trapezius   Flexion Decreased 25%   Extension Decreased 25%   Lateral bend left Normal   Lateral bend right Normal   Masses -   Biceps reflex Normal   Triceps reflex Normal   Brachioradialis reflex Normal     Examination Right shoulder Left shoulder   Skin Intact Intact   Effusion - -   Biceps deformity - -   Atrophy - - AC joint tenderness - -   Acromial tenderness - +, posterior, scapular spine   Biceps tenderness - -   Forward flexion/Elevation  170   Active abduction  160   External rotation ROM 30 30   Internal rotation ROM 70 70   Apprehension - -   Impingement - -   Drop Arm Test - -   Neurovascular Intact Intact     Examination Lumbar Thoracic   Skin Intact Intact   Tenderness + +, left parathoracic   Tightness - +, left parathoracic   Lordosis Normal N/A   Kyphosis N/A Normal   Scoliosis - -   Flexion Fingertips to ankle N/A   Extension 10 N/A   Knee reflexes Normal N/A   Ankle reflexes Normal N/A   Straight leg raise - N/A   Calf tenderness - N/A     PROCEDURE:  After discussing treatment options, patient's left parathoracic region was injected with 4 cc Marcaine and 1/2 cc Celestone. Chart reviewed for the following:   Tara Hunt MD, have reviewed the History, Physical and updated the Allergic reactions for [address] [place] performed immediately prior to start of procedure:  Tara Hunt MD, have performed the following reviews on [facility] prior to the start of the procedure:            * Patient was identified by name and date of birth   * Agreement on procedure being performed was verified  * Risks and Benefits explained to the patient  * Procedure site verified and marked as necessary  * Patient was positioned for comfort  * Consent was obtained     Time: 12:06 PM     Date of procedure: 5/10/2017    Procedure performed by:  Heath Whitfield MD    Ms. Licha Cosme tolerated the procedure well with no complications. RADIOGRAPHS:  XR LEFT SHOULDER 5/10/17  IMPRESSION:  Three views - No fractures, no acromioclavicular narrowing, no glenohumeral narrowing, no calcific densities. IMPRESSION:      ICD-10-CM ICD-9-CM    1.  Myalgia M79.1 729.1 betamethasone (CELESTONE SOLUSPAN) 6 mg/mL injection      BETAMETHASONE ACETATE & SODIUM PHOSPHATE INJECTION 3 MG EA. THER/PROPH/DIAG INJECTION, SUBCUT/IM      bupivacaine, PF, (MARCAINE, PF,) 0.25 % (2.5 mg/mL) injection      REFERRAL TO SPINE SURGERY    Thoracic   2. Left shoulder pain, unspecified chronicity M25.512 719.41 AMB POC XRAY, SHOULDER; COMPLETE, 2+   3. Gastroesophageal reflux disease, esophagitis presence not specified K21.9 530.81    4. BMI 40.0-44.9, adult ([facility] [address] Z68.41 V85.41    5. Neck pain M54.2 723.1 REFERRAL TO SPINE SURGERY   6. Cervical radiculopathy M54.12 723.4 betamethasone (CELESTONE SOLUSPAN) 6 mg/mL injection      BETAMETHASONE ACETATE & SODIUM PHOSPHATE INJECTION 3 MG EA. THER/PROPH/DIAG INJECTION, SUBCUT/IM      bupivacaine, PF, (MARCAINE, PF,) 0.25 % (2.5 mg/mL) injection      REFERRAL TO SPINE SURGERY   7. Left-sided thoracic back pain, unspecified chronicity M54.6 724.1 betamethasone (CELESTONE SOLUSPAN) 6 mg/mL injection      BETAMETHASONE ACETATE & SODIUM PHOSPHATE INJECTION 3 MG EA. THER/PROPH/DIAG INJECTION, SUBCUT/IM      bupivacaine, PF, (MARCAINE, PF,) 0.25 % (2.5 mg/mL) injection      REFERRAL TO SPINE SURGERY     PLAN:  After discussing treatment options, patient's left parathoracic region was injected with 4 cc Marcaine and 1/2 cc Celestone. She will follow up as needed. She will follow up with her primary care physician for high blood pressure. She will follow up at the spine center if radicular neck and upper back pain continues.       Scribed by [employer] ([facility]) as dictated by George Paz MD

## 2017-06-12 ENCOUNTER — OFFICE VISIT (OUTPATIENT)
Dept: ORTHOPEDIC SURGERY | Age: 72
End: 2017-06-12

## 2017-06-12 VITALS
HEART RATE: 80 BPM | SYSTOLIC BLOOD PRESSURE: 175 MMHG | WEIGHT: 195.6 LBS | OXYGEN SATURATION: 99 % | DIASTOLIC BLOOD PRESSURE: 77 MMHG | RESPIRATION RATE: 16 BRPM | HEIGHT: 59 IN | TEMPERATURE: 98.1 F | BODY MASS INDEX: 39.43 KG/M2

## 2017-06-12 DIAGNOSIS — M89.8X1 CHRONIC SCAPULAR PAIN: Primary | Chronic | ICD-10-CM

## 2017-06-12 DIAGNOSIS — G89.29 CHRONIC SCAPULAR PAIN: Primary | Chronic | ICD-10-CM

## 2017-06-12 RX ORDER — OMEPRAZOLE 40 MG/1
CAPSULE, DELAYED RELEASE ORAL
COMMUNITY
Start: 2017-05-02 | End: 2018-06-19 | Stop reason: ALTCHOICE

## 2017-06-12 NOTE — PATIENT INSTRUCTIONS
Parte superior de la espalda saludable: Ejercicios - [ Healthy Upper Back: Exercises ]  Instrucciones de cuidado  Estos son algunos ejemplos de ejercicios para la parte superior de kate espalda. Comience cada ejercicio lentamente. Reduzca la intensidad del ejercicio si [place] a sentir dolor. Kate médico o el fisioterapeuta le dirán cuándo puede comenzar con estos ejercicios y cuáles funcionarán mejor para usted. Cómo se hacen los ejercicios  Estiramiento de la parte baja del venancio y de la parte superior de la espalda    1. Extienda ninoska brazos hacia adelante. Ponga deandre mano sobre la otra y [address] 2. Estírese suavemente, de manera que sienta que ninoska omóplatos se separan manny del [place]. 3. Agache suavemente kate fred hacia adelante. 4. Mantenga la posición entre 15 y 27 segundos. 5. Repita de 2 a 4 veces. Estiramiento de la parte media de la espalda    Nota: Si usted tiene dolor en la rodilla, no tatyana rasheed ejercicio. 1. Arrodíllese en el piso y [employer] tobillos. 2. Inclínese hacia adelante, ponga ninoska fatmata sobre el piso y estire ninoska brazos hacia el frente. Descanse kate fred entre ninoska brazos. 3. Empuje suavemente con kate pecho [employer], llegando lo más lejos hacia el frente que pueda. 4. Mantenga la posición entre 15 y 27 segundos. 5. Repita de 2 a 4 veces. Rotación de hombros    1. Siéntese cómodamente y separe los pies a la distancia de los hombros. También puede hacer rasheed ejercicio estando de pie. 2. [place] los hombros [place], [place] atrás y posteriormente hacia abajo en un movimiento suave y circular. 3. Repita de 2 a 4 veces. [place] de pared    1. Párese frente a deandre pared, con los pies de 12 a 24 pulgadas (entre 30 y 61 centímetros) de la pared. Si siente algún dolor al hacer rasheed ejercicio, párese más cerca de la pared. 2. Ponga ninoska fatmata sobre la pared ligeramente más separadas que ninoska hombros e inclínese hacia adelante.   3. Incline kate cuerpo suavemente hacia la pared. Lara Liter para regresar a la posición [address] y controlado. 4. Repita de 8 a 12 veces. Juntar los omóplatos con resistencia    Nota: Para rasheed ejercicio, usted necesitará materiales elásticos para ejercicio, will un tubo quirúrgico o Thera-Band. 1. Siéntese o póngase de pie, manteniendo la welsh en ambas fatmata al frente de usted. Mantenga evelina codos cerca de los costados, doblados en un ángulo de 90 grados. Evelina cj deben estar Secretary. 2. Comprima los omóplatos y [employer] brazos hacia afuera, estirando la welsh. Asegúrese de que evelina codos estén a evelina costados mientras lo hace. 3. Relájese. 4. Repita de 8 a 12 veces. Remar con resistencia    Nota: Para rasheed ejercicio, usted necesitará materiales elásticos para ejercicio, will un tubo quirúrgico o Thera-Band. 1. Coloque la welsh alrededor de un objeto sólido, will la pata de [place], aproximadamente a la altura de la cintura. Sostenga un extremo de la welsh en cada mano. 2. Con los codos a los lados y flexionados a 80 grados, jale la welsh hacia atrás para  evelina omóplatos hacia el centro de la espalda. Regrese a la posición inicial.  3. Repita de 8 a 12 veces. La atención de seguimiento es deandre parte clave de kate tratamiento y seguridad. Asegúrese de hacer y acudir a todas las citas, y llame a kate médico si está teniendo problemas. También es deandre buena idea saber los resultados de los exámenes y mantener deandre lista de los medicamentos que jemma. ¿Dónde puede encontrar más información en inglés? Neida Deter a [email or web address]-allyssa.[email or web address] [place] Puff S598 en la búsqueda para aprender más acerca de \"Parte superior de la espalda saludable: Ejercicios - [ Healthy Upper Back: Exercises ]. \"  Revisado: 23 camarena, 2016  Versión del contenido: 11.2  © 1553-9354 [employer].  Las instrucciones de cuidado fueron adaptadas bajo licencia por [employer] (which disclaims liability or warranty for this information). Si usted tiene [place] afección médica o sobre estas instrucciones, siempre pregunte a kate profesional de daniela. [facility] niega toda garantía o responsabilidad por kate uso de esta información.

## 2017-06-12 NOTE — MR AVS SNAPSHOT
Visit Information Mookie Green Personal Médico Departamento Teléfono del Dep. Número de visita 6/12/2017  2:30 PM Agustina Jacob  [facility], [address] [facility] [phone] Follow-up Instructions Return if symptoms worsen or fail to improve. Your Appointments 12/1/2017 10:30 AM  
ROUTINE CARE with Jenny Yusuf MD  
[facility] ([address]) Appt Note: 8 month followup [address] [facility] [address]  
[address] [facility] [address] [facility] [address] Upcoming Health Maintenance Date Due INFLUENZA AGE 9 TO ADULT 8/1/2017 GLAUCOMA SCREENING Q2Y 12/7/2017 MEDICARE YEARLY EXAM 3/28/2018 BREAST CANCER SCRN MAMMOGRAM 10/24/2018 COLONOSCOPY 1/1/2021 DTaP/Tdap/Td series (2 - Td) 9/26/2026 Alergias  Review Complete El: 6/12/2017 Por: Agustina Jacob MD  
 [facility] del:  6/12/2017 Intensidad Anotado Tipo de reacción [employer] Bystolic [Nebivolol]  06/44/8092    Other (comments) Leg cramps Norvasc [Amlodipine]  02/15/2012    Swelling Pravachol [Pravastatin]  05/03/2016   Side Effect Myalgia Vacunas actuales Revisadas el:  9/26/2016 Sophy Ramsey Influenza High Dose Vaccine PF 9/26/2016 Influenza Vaccine 10/14/2014, 10/29/2013 Influenza Vaccine (Quad) PF 10/19/2015 Influenza Vaccine Split 10/2/2012 Pneumococcal Conjugate (PCV-13) 10/19/2015 Pneumococcal Vaccine (Unspecified Type) 1/1/2010 Zoster Vaccine, Live 1/1/2010 No revisadas esta visita You Were Diagnosed With   
  
 Micheal Cardenas Chronic scapular pain    -  Primary ICD-10-CM: M89.8X1, S87.88 ICD-9-CM: 733.90, 338.29 Partes vitales PS Pulso Temperatura Resp [place] ( percentil de crecimiento) Peso (percentil de crecimiento)  175/77 80 98.1 °F (36.7 °C) (Oral) 16 4' 10.5\" (1.486 m) 195 lb 9.6 oz (88.7 kg) SpO2 BMI ([facility] Estado obstétrico Estatus de tabaquísmo 99% 40.18 kg/m2 Hysterectomy Never Smoker BMI and BSA Data Body Mass Index Body Surface Area  
 40.18 kg/m 2 1.91 m 2 Fiordaliza Guillory Pharmacy Name Phone [facility] [address] [place] [phone] Woody lista de medicamentos actualizada Lista actualizada el: 6/12/17  3:17 PM.  Eugene Vega use woody lista de medicamentos más reciente. albuterol 90 mcg/actuation inhaler También conocido will:  PROVENTIL HFA, VENTOLIN HFA, PROAIR HFA Take 2 Puffs by inhalation every six (6) hours as needed for Wheezing. Blood-Glucose Meter monitoring kit También conocido will:  Gertrude Rivera Use daily for glucose monitoring DX E11.9  
  
 cetirizine 10 mg tablet También conocido will:  ZyrTEC Take 1 Tab by mouth daily. cyclobenzaprine 10 mg tablet También conocido will:  FLEXERIL Take 0.5 Tabs by mouth three (3) times daily as needed for Muscle Spasm(s). FISH OIL 1,000 mg Cap Medicamento genérico:  omega-3 fatty acids-vitamin e Take 1 Cap by mouth daily. fluticasone 50 mcg/actuation nasal spray También conocido will:  FLONASE  
  
 * glucose blood VI test strips strip También conocido will:  ASCENSIA AUTODISC VI, ONE TOUCH ULTRA TEST VI One Touch Ultra Use daily for blood glucose monitoring. DX: E11.9  
  
 * glucose blood VI test strips strip También conocido will:  blood glucose test  
DX: E11.9 DM check fasting blood glucose once daily  
  
 hydroCHLOROthiazide 25 mg tablet También conocido will:  HYDRODIURIL Take 1 Tab by mouth daily. multivitamin tablet También conocido will:  ONE A DAY Take 1 Tab by mouth daily. naproxen 500 mg tablet También conocido will:  NAPROSYN Take 1 Tab by mouth two (2) times daily (with meals). omeprazole 20 mg capsule También conocido will:  Yoseph Lucas  
  
 One Touch Delica 33 gauge Misc Medicamento genérico:  lancets USE ONE LANCET TO CHECK BLOOD SUGARS ONCE DAILY  
  
 sucralfate 1 gram tablet También conocido will:  Jose J Abbotttingham * Aviso:  Esta lista contiene medicamentos que son iguales a otros medicamentos recetados para usted. Mary las instrucciones con cuidado y pida a kate personal médico que revise la lista de medicamentos y las instrucciones correspondientes con usted. Instrucciones de seguimiento Return if symptoms worsen or fail to improve. Instrucciones para el Paciente Parte superior de la espalda saludable: Ejercicios - [ Healthy Upper Back: Exercises ] Instrucciones de cuidado Estos son algunos ejemplos de ejercicios para la parte superior de kate espalda. Comience cada ejercicio lentamente. Reduzca la intensidad del ejercicio si Joann Rancher a sentir dolor. Kate médico o el fisioterapeuta le dirán cuándo puede comenzar con estos ejercicios y cuáles funcionarán mejor para usted. Cómo se hacen los ejercicios Estiramiento de la parte baja del venancio y de la parte superior de la espalda 1. Extienda ninoska brazos hacia adelante. Ponga deandre mano sobre la otra y [address] 2. Estírese suavemente, de manera que sienta que ninoska omóplatos se separan manny del [place]. 3. Agache suavemente kate fred hacia adelante. 4. Mantenga la posición entre 15 y 27 segundos. 5. Repita de 2 a 4 veces. Estiramiento de la parte media de la espalda Nota: Si usted tiene [employer] rodilla, no tatyana rasheed ejercicio. 1. Arrodíllese en el piso y [employer] tobillos. 2. Inclínese hacia adelante, ponga ninoska fatmata sobre el piso y estire ninoska brazos hacia el frente. Descanse kate fred entre ninoska brazos. 3. Empuje suavemente con kate pecho [employer], llegando lo más lejos hacia el frente que pueda. 4. Mantenga la posición entre 15 y 27 segundos. 5. Repita de 2 a 4 veces.  
Rotación de hombros 
 
 1. Siéntese cómodamente y separe los pies a la distancia de los hombros. También puede hacer rasheed ejercicio estando de pie. 2. [place] los hombros [place], [place] atrás y posteriormente hacia abajo en un movimiento suave y circular. 3. Repita de 2 a 4 veces. Lagartija de pared 1. Párese frente a deandre pared, con los pies de 12 a 24 pulgadas (entre 30 y 61 centímetros) de la pared. Si siente algún dolor al hacer rashede ejercicio, párese más cerca de la pared. 2. Ponga evelina fatmata sobre la pared ligeramente más separadas que evelina hombros e inclínese hacia adelante. 3. Incline kate cuerpo suavemente hacia la pared. Luego empuje para regresar a la posición [address] y [place]. 4. Repita de 8 a 12 veces. Juntar los omóplatos con Josefine Banker Nota: Para rasheed ejercicio, usted necesitará materiales elásticos para ejercicio, will un tubo quirúrgico o Thera-Band. 1. Siéntese o póngase de pie, manteniendo la welsh en ambas fatmata al frente de usted. Mantenga evelina codos cerca de los costados, doblados en un ángulo de 90 grados. Evelina cj deben estar [place]. 2. Comprima los omóplatos y [employer] brazos hacia afuera, estirando la welsh. Asegúrese de que evelina codos estén a evelina costados mientras lo hace. 3. Relájese. 4. Repita de 8 a 12 veces. Remar con Josefine Banker Nota: Para rasheed ejercicio, usted necesitará materiales elásticos para ejercicio, will un tubo quirúrgico o Thera-Band. 1. Coloque la welsh alrededor de un objeto sólido, will la pata de [place], aproximadamente a la altura de la cintura. Sostenga un extremo de la welsh en cada mano. 2. Con los codos a los lados y flexionados a 80 grados, jale la welsh hacia atrás para  evelina omóplatos hacia el centro de la espalda. Regrese a la posición inicial. 
3. Repita de 8 a 12 veces. La atención de seguimiento es deandre parte clave de kate tratamiento y seguridad.  Asegúrese de hacer y acudir a todas las citas, y llame a kate médico si está teniendo problemas. También es deandre buena idea saber los resultados de los exámenes y mantener deandre lista de los medicamentos que jemma. Dónde puede encontrar más información en inglés? Adrian Douglas a [email or web address]-allyssa.info/. [place] W658 en la búsqueda para aprender más acerca de \"Parte superior de la espalda saludable: Ejercicios - [ Healthy Upper Back: Exercises ]. \" 
Revisado: 23 [place], 2016 Versión del contenido: 11.2 © 2235-6120 Healthwise, Incorporated. Las instrucciones de cuidado fueron adaptadas bajo licencia por [employer] (which disclaims liability or warranty for this information). Si usted tiene [place] afección médica o sobre estas instrucciones, siempre pregunte a kate profesional de daniela. Healthwise, Incorporated niega toda garantía o responsabilidad por kate uso de esta información. Introducing [facility] [place] SERVICES! Estimado Bertha : 
Juan por solicitar deandre cuenta de [place] ansari. Nuestros registros indican que usted ya tiene deandre cuenta [place]. Usted puede acceder a kate cuenta en cualquier momento en [email or web address] Sabía usted que usted puede acceder a kate hospital y las instrucciones de pita ER en cualquier momento en MyChart ? También puede revisar [facility] de las pruebas de kate hospitalización o visita a urgencias . Información Adicional 
 
Si tiene alguna pregunta , por favor visite la sección de preguntas frecuentes del sitio web MyChart en [email or web address] . Recuerde, MyChart NO es que se utilizará para las necesidades urgentes. Para emergencias médicas , llame al 911 . Ahora disponible en kate iPhone y Android ! Por favor proporcione rasheed resumen de la documentación de cuidado a kate próximo proveedor. Your primary care clinician is listed as Oniel Valderrama. If you have any questions after today's visit, please call [phone].

## 2017-06-12 NOTE — PROGRESS NOTES
[address]  [address], [address]  [place], [address] [place] [address] Street  Phone: [phone]  Fax: [phone]        Milo Tinoco  : 1945  PCP: Shekhar Rivera MD      NEW PATIENT      ASSESSMENT AND PLAN     Justine Velasquez was seen today for back pain. Diagnoses and all orders for this visit:    Chronic scapular pain-intermittent    1. Continue Flexeril. 2. Advised to stay active as tolerated. 3. Given home exercises. Follow-up Disposition:  Return if symptoms worsen or fail to improve. CHIEF COMPLAINT  Darrell Haines is seen today in consultation at the request of Dr. Khushbu Allred and Dr. Reggie Garcia for complaints of intermittent left shoulder blade pain for a long time. HISTORY OF PRESENT ILLNESS  Darrell Haines is a 66 y.o. female. RHD. Interpretor phone used for visit. Pashto speaking patient. Pt denies any specific incident or injury that caused their pain. Her pain is mainly below in her LT shoulder blade and goes into her back. She has had her pain for about 3-4 years now. Her pain has been increasing in severity. Her pain is not triggered by activity. She denies any heart conditions. Pt has had shingles in the past which was more on her face and leg. She denies any shingles on her shoulder that she noticed. She has had physical therapy in the past for her shoulder blade pain that did not give her much benefit. Pt has been seen by Dr. Reggie Garcia who took an xray of her shoulder. She had an injection by Dr. Reggie Garcia in the office that gave her benefit. Pt notes that since her injection she has not had her pain. She usually has her pain once every few months. She denies any paresthesia in her UE. She denies any neck pain in the office today. She has tried Naprosyn and Tylenol without relief. Pt takes Flexeril 10 mg PRN that gives her some benefit. Denies persistent fevers, chills, weight changes, neurogenic bowel or bladder symptoms.  Pt denies recent ED visits or hospitalizations. Pain Assessment  5/10/2017   Location of Pain Shoulder   Location Modifiers Left   Severity of Pain 8   Quality of Pain Aching   Duration of Pain A few minutes   Frequency of Pain Several times daily   Aggravating Factors Other (Comment)   Aggravating Factors Comment nothing specific   Limiting Behavior Some   Relieving Factors Rest         RADIOGRAPHS from Dr. Kezia Watkins office:  XR LEFT SHOULDER 5/10/17  IMPRESSION: Three views - No fractures, no acromioclavicular narrowing, no glenohumeral narrowing, no calcific densities. PAST MEDICAL HISTORY   Past Medical History:   Diagnosis Date    Alpha thalassemia ([facility] [address] 2/15/2012    Anxiety     BMI 40.0-44.9, adult ([facility] [address] 5/10/2017    Breast tumor     benign cyst    Carotid stenosis, left     LICA <07%    Esophageal stricture     dr. Armando Wilson, s/p balloon dilation 9/2016    Gastric polyps     GERD (gastroesophageal reflux disease)     endoscopy 7/08- hiatal hernia/gastric polyps, esophagitis 9/2016 on prilosec/carafate    Headache     Hypertension     nuclear stress test neg 1/2009    Hypertensive retinopathy     IFG (impaired fasting glucose)     Osteopenia        Past Surgical History:   Procedure Laterality Date    HX BREAST BIOPSY  1978    bilateral breasts    HX HYSTERECTOMY         MEDICATIONS    Current Outpatient Prescriptions   Medication Sig Dispense Refill    ONE TOUCH DELICA 33 gauge misc USE ONE LANCET TO CHECK BLOOD SUGARS ONCE DAILY 100 Lancet 3    sucralfate (CARAFATE) 1 gram tablet       cyclobenzaprine (FLEXERIL) 10 mg tablet Take 0.5 Tabs by mouth three (3) times daily as needed for Muscle Spasm(s). 30 Tab 0    glucose blood VI test strips (BLOOD GLUCOSE TEST) strip DX: E11.9 DM check fasting blood glucose once daily 200 Strip 2    hydroCHLOROthiazide (HYDRODIURIL) 25 mg tablet Take 1 Tab by mouth daily.  90 Tab 3    albuterol (PROVENTIL HFA, VENTOLIN HFA, PROAIR HFA) 90 mcg/actuation inhaler Take 2 Puffs by inhalation every six (6) hours as needed for Wheezing. 1 Inhaler 0    naproxen (NAPROSYN) 500 mg tablet Take 1 Tab by mouth two (2) times daily (with meals). 30 Tab 0    cetirizine (ZYRTEC) 10 mg tablet Take 1 Tab by mouth daily. 30 Tab 0    glucose blood VI test strips (ASCENSIA AUTODISC VI, ONE TOUCH ULTRA TEST VI) strip One Touch Ultra Use daily for blood glucose monitoring. DX: E11.9 3 Package 3    Blood-Glucose Meter (ONETOUCH ULTRAMINI) monitoring kit Use daily for glucose monitoring DX E11.9 1 Kit 0    omeprazole (PRILOSEC) 20 mg capsule       fluticasone (FLONASE) 50 mcg/actuation nasal spray       omega-3 fatty acids-vitamin e (FISH OIL) 1,000 mg Cap Take 1 Cap by mouth daily.  multivitamin (ONE A DAY) tablet Take 1 Tab by mouth daily. ALLERGIES  Allergies   Allergen Reactions    Bystolic [Nebivolol] Other (comments)     Leg cramps    Norvasc [Amlodipine] Swelling    Pravachol [Pravastatin] Myalgia          SOCIAL HISTORY    Social History     Social History    Marital status:      Spouse name: N/A    Number of children: N/A    Years of education: N/A     Occupational History    Not on file. Social History Main Topics    Smoking status: Never Smoker    Smokeless tobacco: Never Used    Alcohol use No      Comment: \"Often\"    Drug use: No    Sexual activity: Yes     Partners: Male     Other Topics Concern    Not on file     Social History Narrative       FAMILY HISTORY  Family History   Problem Relation Age of Onset    Cancer Mother          REVIEW OF SYSTEMS  Review of Systems   Constitutional: Negative for chills, fever and weight loss. Respiratory: Negative for shortness of breath. Cardiovascular: Negative for chest pain. Gastrointestinal: Negative for constipation. Negative for fecal incontinence   Genitourinary: Negative for dysuria.         Negative for urinary incontinence   Musculoskeletal:        Per HPI   Skin: Negative for rash. Neurological: Negative for dizziness, tingling, tremors, focal weakness and headaches. Endo/Heme/Allergies: Does not bruise/bleed easily. Psychiatric/Behavioral: The patient does not have insomnia. PHYSICAL EXAMINATION  Visit Vitals    /77    Pulse 80    Temp 98.1 °F (36.7 °C) (Oral)    Resp 16    Ht 4' 10.5\" (1.486 m)    Wt 195 lb 9.6 oz (88.7 kg)    SpO2 99%    BMI 40.18 kg/m2          Accompanied by spouse. Constitutional:  Well developed, well nourished, in no acute distress. Psychiatric: Affect and mood are appropriate. Integumentary: No rashes or abrasions noted on exposed areas. Cardiovascular/Peripheral Vascular: Intact l pulses. No peripheral edema is noted. Lymphatic:  No evidence of lymphedema. No cervical lymphadenopathy. SPINE/MUSCULOSKELETAL EXAM    Cervical spine:  Neck is midline. Normal muscle tone. No focal atrophy is noted. Shoulder ROM intact. No tenderness at scapula. No scapular winging. No tenderness along scapular border. Negative Spurling's sign. Negative Tinel's sign. Negative Carter's sign. Sensation grossly intact to light touch. MOTOR:      Biceps  Triceps Deltoids Wrist Ext Wrist Flex Hand Intrin   Right +4/5 +4/5 +4/5 +4/5 +4/5 +4/5   Left +4/5 +4/5 +4/5 +4/5 +4/5 +4/5     Ambulation without assistive device. FWB. Written by Kermit Landon, as dictated by Kathy Mandel MD.    I, Dr. Kathy Mandel MD, confirm that all documentation is accurate. Ms. Ирина Lisa may have a reminder for a \"due or due soon\" health maintenance. I have asked that she contact her primary care provider for follow-up on this health maintenance.

## 2017-10-12 RX ORDER — HYDROCHLOROTHIAZIDE 25 MG/1
TABLET ORAL
Qty: 90 TAB | Refills: 0 | Status: SHIPPED | OUTPATIENT
Start: 2017-10-12 | End: 2017-12-13 | Stop reason: SDUPTHER

## 2017-10-25 ENCOUNTER — HOSPITAL ENCOUNTER (OUTPATIENT)
Dept: MAMMOGRAPHY | Age: 72
Discharge: HOME | End: 2017-10-25
Attending: FAMILY MEDICINE
Payer: MEDICARE

## 2017-10-25 DIAGNOSIS — Z12.31 VISIT FOR SCREENING MAMMOGRAM: ICD-10-CM

## 2017-10-25 PROCEDURE — 77067 SCR MAMMO BI INCL CAD: CPT

## 2017-10-28 ENCOUNTER — HOSPITAL ENCOUNTER (EMERGENCY)
Age: 72
Discharge: HOME | End: 2017-10-28
Attending: EMERGENCY MEDICINE | Admitting: EMERGENCY MEDICINE
Payer: MEDICARE

## 2017-10-28 ENCOUNTER — APPOINTMENT (OUTPATIENT)
Dept: GENERAL RADIOLOGY | Age: 72
End: 2017-10-28
Attending: EMERGENCY MEDICINE
Payer: MEDICARE

## 2017-10-28 VITALS
HEART RATE: 82 BPM | SYSTOLIC BLOOD PRESSURE: 168 MMHG | DIASTOLIC BLOOD PRESSURE: 91 MMHG | BODY MASS INDEX: 39.31 KG/M2 | OXYGEN SATURATION: 100 % | RESPIRATION RATE: 18 BRPM | TEMPERATURE: 98 F | HEIGHT: 59 IN | WEIGHT: 195 LBS

## 2017-10-28 DIAGNOSIS — G89.29 CHRONIC SCAPULAR PAIN: Primary | ICD-10-CM

## 2017-10-28 DIAGNOSIS — M89.8X1 CHRONIC SCAPULAR PAIN: Primary | ICD-10-CM

## 2017-10-28 LAB
ATRIAL RATE: 66 BPM
CALCULATED P AXIS, ECG09: 44 DEGREES
CALCULATED R AXIS, ECG10: 4 DEGREES
CALCULATED T AXIS, ECG11: 73 DEGREES
DIAGNOSIS, 93000: NORMAL
P-R INTERVAL, ECG05: 166 MS
Q-T INTERVAL, ECG07: 394 MS
QRS DURATION, ECG06: 88 MS
QTC CALCULATION (BEZET), ECG08: 413 MS
VENTRICULAR RATE, ECG03: 66 BPM

## 2017-10-28 PROCEDURE — 96372 THER/PROPH/DIAG INJ SC/IM: CPT

## 2017-10-28 PROCEDURE — 93005 ELECTROCARDIOGRAM TRACING: CPT

## 2017-10-28 PROCEDURE — 74011250636 HC RX REV CODE- 250/636: Performed by: EMERGENCY MEDICINE

## 2017-10-28 PROCEDURE — 71020 XR CHEST PA LAT: CPT

## 2017-10-28 PROCEDURE — 74011250637 HC RX REV CODE- 250/637: Performed by: EMERGENCY MEDICINE

## 2017-10-28 PROCEDURE — 99283 EMERGENCY DEPT VISIT LOW MDM: CPT

## 2017-10-28 RX ORDER — HYDROCODONE BITARTRATE AND ACETAMINOPHEN 5; 325 MG/1; MG/1
1 TABLET ORAL
Status: COMPLETED | OUTPATIENT
Start: 2017-10-28 | End: 2017-10-28

## 2017-10-28 RX ORDER — KETOROLAC TROMETHAMINE 30 MG/ML
15 INJECTION, SOLUTION INTRAMUSCULAR; INTRAVENOUS
Status: COMPLETED | OUTPATIENT
Start: 2017-10-28 | End: 2017-10-28

## 2017-10-28 RX ORDER — TRAMADOL HYDROCHLORIDE 50 MG/1
50 TABLET ORAL
Qty: 6 TAB | Refills: 0 | Status: SHIPPED | OUTPATIENT
Start: 2017-10-28 | End: 2020-10-05

## 2017-10-28 RX ORDER — LIDOCAINE 50 MG/G
PATCH TOPICAL
Qty: 6 EACH | Refills: 0 | Status: SHIPPED | OUTPATIENT
Start: 2017-10-28 | End: 2017-12-01

## 2017-10-28 RX ADMIN — HYDROCODONE BITARTRATE AND ACETAMINOPHEN 1 TABLET: 5; 325 TABLET ORAL at 09:50

## 2017-10-28 RX ADMIN — KETOROLAC TROMETHAMINE 15 MG: 30 INJECTION, SOLUTION INTRAMUSCULAR at 09:50

## 2017-10-28 NOTE — ED PROVIDER NOTES
HPI Comments: 9:32 AM Yoselin Chavarria is a 74 y.o. female with a history of GERD, HTN, and carotid stenosis presents to ED c/o left sided shoulder pain that has been ongoing however progressively worsened within the last week. Pt saw her orthopedist previously, most recently approx 4 months ago, and was diagnosed with either a pinched nerve or muscle spasms however she is unsure exactly what the cause is. She was prescribed Flexeril in the past, no longer on the medication, and states minimal relief with the medication. Previous records indicate she had relief with local injections into the area. She also tried Tylenol and Advil recently, no relief. Denies any new exercises or lifting, injury, numbness/weakness. No appreciable changes in symptoms today compared to previously. She has hx of HTN and is on Hydrochlorothiazide. Denies fever, cough, neck pain, chest wall pain. No further complaints at the moment. The history is provided by the patient and a relative. A  was used.         Past Medical History:   Diagnosis Date    Alpha thalassemia ([facility] 2/15/2012    Anxiety     [facility] 40.[facility]44.[facility]10[facility]2017    Breast tumor     benign cyst    Carotid stenosis, left     LICA <09%    Esophageal stricture     dr. Lottie Gasca, s/p balloon dilation 9/2016    Gastric polyps     GERD (gastroesophageal reflux disease)     endoscopy 7/08- hiatal hernia/gastric polyps, esophagitis 9/2016 on prilosec/carafate    Headache(784.0)     Hypertension     nuclear stress test neg 1/2009    Hypertensive retinopathy     IFG (impaired fasting glucose)     Osteopenia        Past Surgical History:   Procedure Laterality Date    HX BREAST BIOPSY  1978    bilateral breasts    HX HYSTERECTOMY           Family History:   Problem Relation Age of Onset    Cancer Mother        Social History     Social History    Marital status:      Spouse name: N/A    Number of children: N/A    Years of education: N/A     Occupational History    Not on file. Social History Main Topics    Smoking status: Never Smoker    Smokeless tobacco: Never Used    Alcohol use No      Comment: \"Often\"    Drug use: No    Sexual activity: Yes     Partners: Male     Other Topics Concern    Not on file     Social History Narrative         ALLERGIES: Bystolic [nebivolol]; Norvasc [amlodipine]; and Pravachol [pravastatin]    Review of Systems   Constitutional: Negative for chills, fatigue and fever. HENT: Negative for congestion, ear pain, rhinorrhea and sore throat. Eyes: Negative for pain, redness and itching. Respiratory: Negative for cough, chest tightness and shortness of breath. Cardiovascular: Negative for chest pain, palpitations and leg swelling. Gastrointestinal: Negative for abdominal pain, diarrhea, nausea and vomiting. Genitourinary: Negative for decreased urine volume, dysuria, flank pain, hematuria and pelvic pain. Musculoskeletal: Negative for arthralgias, back pain, joint swelling, myalgias and neck pain. Positive for shoulder pain    Skin: Negative for color change, pallor and rash. Neurological: Negative for dizziness, weakness and headaches. Hematological: Negative for adenopathy. Does not bruise/bleed easily. All other systems reviewed and are negative. Vitals:    10/28/17 0929   BP: (!) 168/91   Pulse: 82   Resp: 18   Temp: 98 °F (36.7 °C)   SpO2: 100%   Weight: 88.5 kg (195 lb)   Height: 4' 11\" (1.499 m)            Physical Exam   Constitutional: No distress. HENT:   Head: Normocephalic and atraumatic. Mouth/Throat: Oropharynx is clear and moist.   Eyes: Conjunctivae and EOM are normal. Pupils are equal, round, and reactive to light. Neck: Normal range of motion. Neck supple. Cardiovascular: Normal rate, regular rhythm and normal heart sounds. No murmur heard. Pulmonary/Chest: Effort normal and breath sounds normal. She has no wheezes. She has no rales.    Abdominal: Soft. Bowel sounds are normal. She exhibits no distension. There is no tenderness. Musculoskeletal: Normal range of motion. She exhibits tenderness (left lower medial scapular border ). She exhibits no edema or deformity. No scapular winging. Rull ROM of left shoulder. No effusion. No muscle atrophy. Strength 5/5 in biceps and triceps distributions bilat. Sensation and pulses intact distally in bilat upper extremities. Lymphadenopathy:     She has no cervical adenopathy. Neurological: She is alert. She exhibits normal muscle tone. Coordination normal.   Skin: Skin is warm and dry. No rash noted. She is not diaphoretic. No erythema. Psychiatric: She has a normal mood and affect. Her behavior is normal.        [facility]  ED Course       Procedures    Vitals:  Patient Vitals for the past 12 hrs:   Temp Pulse Resp BP SpO2   10/28/17 0929 98 °F (36.7 °C) 82 18 (!) 168/91 100 %   Patient is 100% O2 on RA, indicating adequate oxygenation.        Medications ordered:   Medications   HYDROcodone-acetaminophen (NORCO) 5-325 mg per tablet 1 Tab (1 Tab Oral Given 10/28/17 0950)   ketorolac (TORADOL) injection 15 mg (15 mg IntraMUSCular Given 10/28/17 0950)         Lab findings:  Recent Results (from the past 12 hour(s))   EKG, 12 LEAD, INITIAL    Collection Time: 10/28/17  9:47 AM   Result Value Ref Range    Ventricular Rate 66 BPM    Atrial Rate 66 BPM    P-R Interval 166 ms    QRS Duration 88 ms    Q-T Interval 394 ms    QTC Calculation (Bezet) 413 ms    Calculated P Axis 44 degrees    Calculated R Axis 4 degrees    Calculated T Axis 73 degrees    Diagnosis       Normal sinus rhythm  Normal ECG  When compared with ECG of 10-NORA-2015 12:22,  No significant change was found         EKG interpretation by ED Physician:    9:52AM- NSR, 66 bpm, no acute ST changes     X-Ray, CT or other radiology findings or impressions:    CXR- ED interpretation   Impression: No acute pathology     Progress notes, Consult notes or additional Procedure notes:     10:47 AM  Patient feeling improved after medications. Sx ongoing for several years, no acute changes today, gradually worsening since last visit with orthopedics a few months ago. CXR and EKG unremarkable, no signs of cardiopulmonary process. Will rx additional analgesics and have her f/u again with orthopedics this week. Disposition:  Diagnosis:   1. Chronic scapular pain        Disposition: discharged. Follow-up Information     None           Patient's Medications   Start Taking    LIDOCAINE (LIDODERM) 5 %    Apply patch to the affected area for 12 hours a day and remove for 12 hours a day. Indications: Shoulder pain    TRAMADOL (ULTRAM) 50 MG TABLET    Take 1 Tab by mouth every eight (8) hours as needed for Pain. Max Daily Amount: 150 mg. Continue Taking    ALBUTEROL (PROVENTIL HFA, VENTOLIN HFA, PROAIR HFA) 90 MCG/ACTUATION INHALER    Take 2 Puffs by inhalation every six (6) hours as needed for Wheezing. BLOOD-GLUCOSE METER (ONETOUCH ULTRAMINI) MONITORING KIT    Use daily for glucose monitoring DX E11.9    CETIRIZINE (ZYRTEC) 10 MG TABLET    Take 1 Tab by mouth daily. FLUTICASONE (FLONASE) 50 MCG/ACTUATION NASAL SPRAY        GLUCOSE BLOOD VI TEST STRIPS (ASCENSIA AUTODISC VI, ONE TOUCH ULTRA TEST VI) STRIP    One Touch Ultra Use daily for blood glucose monitoring. DX: E11.9    GLUCOSE BLOOD VI TEST STRIPS (BLOOD GLUCOSE TEST) STRIP    DX: E11.9 DM check fasting blood glucose once daily    HYDROCHLOROTHIAZIDE (HYDRODIURIL) 25 MG TABLET    TAKE 1 TAB BY MOUTH DAILY. MULTIVITAMIN (ONE A DAY) TABLET    Take 1 Tab by mouth daily. NAPROXEN (NAPROSYN) 500 MG TABLET    Take 1 Tab by mouth two (2) times daily (with meals). OMEGA-3 FATTY ACIDS-VITAMIN E (FISH OIL) 1,000 MG CAP    Take 1 Cap by mouth daily.     OMEPRAZOLE (PRILOSEC) 20 MG CAPSULE        OMEPRAZOLE (PRILOSEC) 40 MG CAPSULE        ONE TOUCH DELICA 33 GAUGE MISC    USE ONE LANCET TO CHECK BLOOD SUGARS ONCE DAILY    SUCRALFATE (CARAFATE) 1 GRAM TABLET       These Medications have changed    No medications on file   Stop Taking    CYCLOBENZAPRINE (FLEXERIL) 10 MG TABLET    Take 0.5 Tabs by mouth three (3) times daily as needed for Muscle Spasm(s). Scribe Attestation      Ivelisse Hall (Aj)aker acting as a scribe for and in the presence of Enmanuel Rose MD      October 28, 2017 at 9:31 AM       Provider Attestation:      I personally performed the services described in the documentation, reviewed the documentation, as recorded by the scribe in my presence, and it accurately and completely records my words and actions.  October 28, 2017 at 9:31 AM - Enmanuel Rose MD

## 2017-10-28 NOTE — DISCHARGE INSTRUCTIONS
PLEASE SEE YOUR ORTHOPEDIST. IF YOU HAVE SEVERE PAIN, NUMBNESS OR WEAKNESS, CHEST PAIN, FEVER, TROUBLE BREATHING OR ANY OTHER WORRYING SIGNS THEN RETURN TO THE ER RIGHT AWAY. Dolor en el hombro: Instrucciones de cuidado - [ Shoulder Pain: Care Instructions ]  Instrucciones de cuidado    Puede lesionar woody hombro al usarlo demasiado cruzito [place], will pescar o jugar béisbol. Puede suceder will parte del desgaste cotidiano por el envejecimiento. Las lesiones de hombro pueden tardar tiempo en sanar, puja woody hombro debería mejorar con [employer]. Woody médico podría recomendar un cabestrillo para descansar el hombro. Si se lesionó el hombro, alhaji vez necesite pruebas y [place]. La atención de seguimiento es deandre parte clave de woody tratamiento y seguridad. Asegúrese de hacer y acudir a todas las citas, y llame a woody médico si está teniendo problemas. También es deandre buena idea saber los resultados de los exámenes y mantener deandre lista de los medicamentos que jemma. ¿Cómo puede cuidarse en el hogar? · [employer] analgésicos (medicamentos para el dolor) exactamente según las indicaciones. ¨ Si el médico le recetó analgésicos, tómelos según las indicaciones. ¨ Si no está tomando un analgésico recetado, pregúntele a woody médico si puede abdi manny de [employer]. ¨ No tome dos o más analgésicos al [employer], a menos que el médico se lo haya indicado. Muchos analgésicos contienen acetaminofén, es decir, Tylenol. El exceso de acetaminofén (Tylenol) puede ser dañino. · Si woody médico le recomienda usar un cabestrillo, úselo will se le haya indicado. No se lo quite antes de que se lo indique el médico.  · Aplíquese hielo o deandre compresa fría sobre la ant adolorida cruizto 10 a 20 minutos cada vez. Póngase un paño grande entre el hielo y la piel. · Si no hay hinchazón, puede aplicar calor húmedo, deandre almohadilla térmica o un paño tibio sobre el hombro. Algunos médicos sugieren alternar W. RAshley Welch y venessa.   · Descanse el hombro cruzito [employer] [place]. Si kate médico lo recomienda, puede comenzar a ejercitar el hombro con suavidad, puja no levante nada pesado. ¿Cuándo debe pedir ayuda? Llame al 911 en cualquier momento que considere que necesita atención de [place]. Por ejemplo, llame si:  ? · Siente dolor u opresión en el pecho. [place] podría ocurrir junto con:  ¨ Sudoración. ¨ Falta de aire. ¨ Náuseas o vómito. ¨ Dolor que se extiende del pecho al venancio, la [place], o hacia manny o ambos hombros o [place]. ¨ Mareos o aturdimiento. ¨ Pulso rápido o irregular. Después de llamar al 911, mastique 1 aspirina para adultos. Espere a la ambulancia. No trate de conducir usted mismo un automóvil. ? · Kate brazo o mano está frío o pálido, o cambia de color. ?Llame a kate médico ahora mismo o busque atención médica inmediata si:  ? · Tiene señales de infección, tales will:  ¨ Mayor dolor, hinchazón, enrojecimiento o aumento de la temperatura en el hombro. ¨ Vetas rojizas que comienzan en deandre ant del hombro. ¨ Pus que supura de deandre ant del hombro. ¨ Ganglios linfáticos inflamados en el venancio, las axilas o la jaskaran. Aruna Sheen. ?Preste especial atención a los cambios en kate daniela y asegúrese de comunicarse con kate médico si:  ? · No puede usar el hombro. ? · El hombro no mejora will se esperaba. ¿Dónde puede encontrar más información en inglés? Jay Labella a [email or web address] [facility] [id number] en la búsqueda para aprender más acerca de \"Dolor en el hombro: Instrucciones de cuidado - [ Shoulder Pain: Care Instructions ]. \"  Revisado: 21 marzo, 2017  Versión del contenido: 11.4  © 0502-7073 Healthwise, [employer]. Las instrucciones de cuidado fueron adaptadas bajo licencia por Good Help Connections (which disclaims liability or warranty for this information). Si usted tiene [place] afección médica o sobre estas instrucciones, siempre pregunte a kate profesional de daniela.  [employer], [employer] niega toda garantía o responsabilidad por kate uso de esta información. Omóplato: Ejercicios - [ Shoulder Blade: Exercises ]  Instrucciones de cuidado  Aquí hay algunos ejemplos de ejercicios típicos para kate afección. Comience cada ejercicio lentamente. Reduzca la intensidad del ejercicio si [place] a sentir dolor. Kate médico o el fisioterapeuta le dirán cuándo puede comenzar con estos ejercicios y cuáles funcionarán mejor para usted. Cómo hacer los ejercicios  Rotación de hombros    1. Párese erguido con el mentón ligeramente hacia adentro. Imagine que tiene deandre cuerda en la parte superior de la fred que lo está jalando en forma recta [place] navarrete. 2. Mantenga los brazos relajados. Todo el movimiento estará en los hombros.  3. [place] de hombros llevándolos hacia las [place], luego hacia [place] y [place] atrás. Betsy un círculo con los hombros hacia abajo y [place] atrás will si estuviera deslizando las fatmata en los bolsillos traseros de los pantalones. 4. Repita los círculos al menos de 2 a 4 veces. 5. Amaya ejercicio también es útil toda vez que desee relajarse. Estiramiento de la base del venancio y parte superior de la espalda    1. Con los brazos aproximadamente a la altura de los hombros, junte las fatmata frente a usted. 2. [place] [address]. 3. Estírese hacia adelante de modo que redondee la parte superior de la espalda. Piense que está separando los omóplatos. Sentirá un estiramiento a lo ancho de la parte superior de la espalda y los hombros. Mantenga la posición por al menos 6 segundos. 4. Repita de 2 a 4 veces. Estiramiento del tríceps    1. Estire el mariano [place] navarrete. 2. Con el codo en el lugar, flexione el brazo y [employer] mano hacia abajo por detrás de la espalda. 3. Con la otra mano, presione suavemente el codo flexionado. Sentirá un estiramiento en el lado de atrás de la parte superior del Tiffani Ravalli y el hombro. Mantenga la posición por aproximadamente 6 segundos.   4. Repita de 2 a 4 veces con Willeen Negus. Estiramiento del hombro    1. Relaje los hombros.  2. [place]ante un brazo a la altura del hombro, y estírelo por sowmya del pecho. 3. Jale el brazo ligeramente contra kate cuerpo con el otro brazo. [place] le ayudará a conseguir un estiramiento suave. Mantenga la posición por al menos 6 segundos. 4. Repita de 2 a 4 veces. Contracción de los omóplatos    1. Siéntese o párese erguido con los brazos a ambos lados. 2. Mantenga los hombros relajados y [place], sin encogerlos. 3. Contraiga los omóplatos hasta que se junten. Mantenga la posición por 6 segundos, luego relájese. 4. Repita de 8 a 12 veces. Contracción de los omóplatos con los brazos estirados    1. Siéntese o párese erguido. Relaje los hombros.  2. Con las cj [place], extienda kate tubo o welsh elástica frente a usted. 3. Comience con deandre tensión ligera en el tubo o la welsh, con las fatmata separadas aproximadamente a la distancia de los hombros.  4. Lentamente jale directamente hacia los costados, mientras contrae y American fork omóplatos. Mantenga los brazos estirados y a la altura de los hombros. Suelte lentamente. 5. Repita de 8 a 12 veces. Jonathan    1. Coloque el tubo o la welsh de plástico aproximadamente a la altura de la cintura. [place] un extremo con cada mano. 2. Siéntese o párese con los pies separados a la distancia de la cadera. 3. Mantenga los brazos estirados frente a usted. Ajuste la distancia para crear deandre tensión ligera en el tubo o la welsh. 4. Lleve el mentón ligeramente hacia adentro. Relaje los hombros.  5. Sin encoger los hombros, jale directamente hacia atrás. Los codos le pasarán al lado de la cintura. Tirones hacia abajo    1. Coloque kate tubo o welsh elástica arriba de deandre ashanti cerrada. [place] un extremo con cada mano. 2. Puede sentarse o estar de pie, según lo que sea más cómodo. Si no tiene buen equilibrio, siéntese en deandre silla.   3. Comience con los brazos levantados y separados de [place], con los codos estirados. [place] deandre ligera tensión en el tubo o la welsh. 4. Lleve el mentón ligeramente hacia adentro y kayla hacia adelante. 5. Manteniendo la espalda derecha, jale lentamente hacia abajo y [place] atrás, flexionando los codos. 6. Deténgase donde las fatmata están al nivel con el mentón, en posición de \"poste de fútbol americano\". 7. Repita de 8 a 12 veces. Estiramiento del pecho en T    1. Recuéstese boca arriba. Eleve las rodillas de modo que estén flexionadas. Cinthya los pies en el piso, separados a la distancia de la cadera. 2. Lleve el mentón hacia adentro y relaje los hombros.  3. Estire los brazos [place] costados. Si no siente un leve estiramiento en los hombros y a lo ancho del pecho, use un tubo de poliestireno o deandre manta fuertemente enrollada debajo de la columna vertebral, desde el cóccix a la fred. 4. Relájese en esta posición por al menos de 15 a 30 segundos mientras respira normalmente. Repita de 2 a 4 veces. 5. A medida que se acostumbra a [facility], siga añadiendo un poco más de tiempo hasta que pueda relajarse en esta posición por 2 o 3 minutos. Cuando pueda relajarse por al menos 2 minutos, solo necesita hacer el ejercicio 1 vez por sesión. Estiramiento del pecho en forma de poste de fútbol    1. Recuéstese boca arriba. Eleve las rodillas de modo que estén flexionadas. Apoye los pies en el suelo, separados a la distancia del ancho de la cadera. 2. Lleve el mentón hacia adentro y relaje los hombros.  3. Estire los brazos en línea recta [place] costados. 4. Doble los brazos flexionando los codos, con las fatmata apuntando [employer] parte superior de la fred. Los brazos deberían formar deandre letra \"L\" a ambos lados de la fred. Tenga las [address] de las fatmata [place].   5. Si no siente un estiramiento leve en los hombros y a lo ancho del pecho, use un tubo de poliestireno o deandre manta fuertemente enrollada bajo la columna vertebral, desde el cóccix a la fred. 6. Relájese en esta posición por al menos de 15 a 30 segundos mientras respira normalmente. Repita de 2 a 4 veces. 7. Cada día que tatyana rasheed ejercicio, añada un poco más de tiempo hasta que pueda relajarse en esta posición por 2 o 3 minutos. Cuando pueda relajarse por al menos 2 minutos, solo necesita hacer el ejercicio 1 vez por sesión. La atención de seguimiento es deandre parte clave de kate tratamiento y seguridad. Asegúrese de hacer y acudir a todas las citas, y llame a kate médico si está teniendo problemas. También es deandre buena idea saber los resultados de los exámenes y mantener deandre lista de los medicamentos que jemma. ¿Dónde puede encontrar más información en inglés? Navid Chau a [email or web address] Ebony Luna O284 en la búsqueda para aprender más acerca de \"Omóplato: Ejercicios - [ Shoulder Blade: Exercises ]. \"  Revisado: 21 marzo, 2017  Versión del contenido: 11.4  © 3735-5706 Healthwise, Incorporated. Las instrucciones de cuidado fueron adaptadas bajo licencia por Good Help Connections (which disclaims liability or warranty for this information). Si usted tiene [place] afección médica o sobre estas instrucciones, siempre pregunte a kate profesional de daniela. Healthwise, Incorporated niega toda garantía o responsabilidad por kate uso de esta información.

## 2017-10-28 NOTE — ED NOTES
Patient tolerated injection without difficulties. Pt instructed to report any adverse reaction to us immediately. Pt states understanding.

## 2017-10-28 NOTE — ED NOTES
Pt states ready for discharge. Pt states she will follow up with PCP and Ortho as instructed by provider. Pt appears in NOAD. I have reviewed discharge instructions with the patient. Prescriptions were reviewed with patient instructed not to drink alcohol, drive a car, or operate heavy machinery while taking this medicine. The patient verbalized understanding. Patient seen leaving ED ambulatory without difficulty or need for assistance, with S/O in no sign of distress. Patient armband removed and shredded    Current Discharge Medication List      START taking these medications    Details   traMADol (ULTRAM) 50 mg tablet Take 1 Tab by mouth every eight (8) hours as needed for Pain. Max Daily Amount: 150 mg.  Qty: 6 Tab, Refills: 0      lidocaine (LIDODERM) 5 % Apply patch to the affected area for 12 hours a day and remove for 12 hours a day.   Indications: Shoulder pain  Qty: 6 Each, Refills: 0         STOP taking these medications       cyclobenzaprine (FLEXERIL) 10 mg tablet Comments:   Reason for Stopping:

## 2017-10-30 ENCOUNTER — TELEPHONE (OUTPATIENT)
Dept: FAMILY MEDICINE CLINIC | Age: 72
End: 2017-10-30

## 2017-10-30 NOTE — TELEPHONE ENCOUNTER
[facility] is requesting a prior auth on RX Lidoderm patches. Pt was seen in the ED and they don't do prior auth. Please call [phone]       Sending request to nurses.

## 2017-10-31 NOTE — TELEPHONE ENCOUNTER
Prior Auth was DENIED by [facility] [email or web address] for the following reasons:    \"Your case says you are being treated for pain not associated with neuropathic cancer pain, post herpetic neuralgia or diabetic neuropathy.  [employer] has determined that the off-label is of this drug for your condition is not medically accepted per [facility] rules and isn't covered based on available information\"    Please advise

## 2017-10-31 NOTE — TELEPHONE ENCOUNTER
pls inform patient not covered by insurance, and may need to pay out of pocket or request other options from dr. Loyda Gonzalez

## 2017-10-31 NOTE — TELEPHONE ENCOUNTER
Contacted patient and verified identity using name and date of birth (2- identifiers)  Spoke with patient's spouse and advised that medication is not covered and they either need to pay out of pocket or request an alternative from Dr. Felton Friedman.

## 2017-11-01 ENCOUNTER — OFFICE VISIT (OUTPATIENT)
Dept: ORTHOPEDIC SURGERY | Age: 72
End: 2017-11-01

## 2017-11-01 VITALS
SYSTOLIC BLOOD PRESSURE: 186 MMHG | TEMPERATURE: 98.3 F | DIASTOLIC BLOOD PRESSURE: 80 MMHG | WEIGHT: 196.8 LBS | BODY MASS INDEX: 39.75 KG/M2 | OXYGEN SATURATION: 96 % | HEART RATE: 73 BPM

## 2017-11-01 DIAGNOSIS — G89.29 CHRONIC SCAPULAR PAIN: Primary | Chronic | ICD-10-CM

## 2017-11-01 DIAGNOSIS — M89.8X1 CHRONIC SCAPULAR PAIN: Primary | Chronic | ICD-10-CM

## 2017-11-01 RX ORDER — TRAMADOL HYDROCHLORIDE 50 MG/1
TABLET ORAL
Qty: 14 TAB | Refills: 0 | Status: SHIPPED | OUTPATIENT
Start: 2017-11-01 | End: 2018-03-19 | Stop reason: SDUPTHER

## 2017-11-01 NOTE — PROGRESS NOTES
Verbal order entered per Dr. Aga Bills as documented on blue sheet:   -tramadol 50 mg, 1 tab PO every day BID PRN severe pain, disp 14, one week supply  -PT left scapular pain eval for dry needling

## 2017-11-01 NOTE — MR AVS SNAPSHOT
Visit Information Nhi Germain Personal Médico Departamento Teléfono del Dep. Número de visita 11/1/2017  3:20  Abner Norris MD [facility] [phone] Follow-up Instructions Return if symptoms worsen or fail to improve. Your Appointments 12/1/2017 10:30 AM  
ROUTINE CARE with Eren Calderon MD  
[facility] ([address]) Appt Note: 8 month followup [address] [facility] [address]  
[address] [facility] [address] [facility] [address] Upcoming Health Maintenance Date Due INFLUENZA AGE 9 TO ADULT 8/1/2017 GLAUCOMA SCREENING Q2Y 12/7/2017 MEDICARE YEARLY EXAM 3/28/2018 COLONOSCOPY 1/1/2021 DTaP/Tdap/Td series (2 - Td) 9/26/2026 Alergias  Review Complete El: 11/1/2017 Por: 127 Abner Norris MD  
 Debbie Quails del:  11/1/2017 Intensidad Anotado Tipo de reacción [facility] Bystolic [Nebivolol]  22/61/9111    Other (comments) Leg cramps Norvasc [Amlodipine]  02/15/2012    Swelling Pravachol [Pravastatin]  05/03/2016   Side Effect Myalgia Vacunas actuales Revisadas el:  9/26/2016 Zina Silva Influenza High Dose Vaccine PF 9/26/2016 Influenza Vaccine 10/14/2014, 10/29/2013 Influenza Vaccine (Quad) PF 10/19/2015 Influenza Vaccine Split 10/2/2012 Pneumococcal Conjugate (PCV-13) 10/19/2015 ZZZ-RETIRED (DO NOT USE) Pneumococcal Vaccine (Unspecified Type) 1/1/2010 Zoster Vaccine, Live 1/1/2010 No revisadas esta visita You Were Diagnosed With   
  
 Jazmine Garay Chronic scapular pain    -  Primary ICD-10-CM: M89.8X1, W84.82 ICD-9-CM: 733.90, 338.29 Partes vitales PS Pulso Temperatura Peso (percentil de crecimiento) SpO2 BMI (IMC)  
 186/80 73 98.3 °F (36.8 °C) (Oral) 196 lb 12.8 oz (89.3 kg) 96% 39.75 kg/m2 Estado obstétrico Estatus de tabaquísmo Hysterectomy Never Smoker BMI and BSA Data Body Mass Index Body Surface Area 39.75 kg/m 2 1.93 m 2 [facility] Name Phone [facility] [address] [phone] Woody lista de medicamentos actualizada Lista actualizada el: 11/1/17  4:32 PM.  [place]ford Crumble use woody lista de medicamentos más reciente. albuterol 90 mcg/actuation inhaler También conocido will:  PROVENTIL HFA, VENTOLIN HFA, PROAIR HFA Take 2 Puffs by inhalation every six (6) hours as needed for Wheezing. Blood-Glucose Meter monitoring kit También conocido will:  Sabi Price Use daily for glucose monitoring DX E11.9  
  
 cetirizine 10 mg tablet También conocido will:  ZyrTEC Take 1 Tab by mouth daily. FISH OIL 1,000 mg Cap Medicamento genérico:  omega-3 fatty acids-vitamin e Take 1 Cap by mouth daily. fluticasone 50 mcg/actuation nasal spray También conocido will:  FLONASE  
  
 * glucose blood VI test strips strip También conocido will:  ASCENSIA AUTODISC VI, ONE TOUCH ULTRA TEST VI One Touch Ultra Use daily for blood glucose monitoring. DX: E11.9  
  
 * glucose blood VI test strips strip También conocido will:  blood glucose test  
DX: E11.9 DM check fasting blood glucose once daily  
  
 hydroCHLOROthiazide 25 mg tablet También conocido will:  HYDRODIURIL  
TAKE 1 TAB BY MOUTH DAILY. lidocaine 5 % También conocido will:  [place] Apply patch to the affected area for 12 hours a day and remove for 12 hours a day. Indications: Shoulder pain  
  
 multivitamin tablet También conocido will:  ONE A DAY Take 1 Tab by mouth daily. naproxen 500 mg tablet También conocido will:  NAPROSYN Take 1 Tab by mouth two (2) times daily (with meals). * omeprazole 20 mg capsule También conocido will:  Nora Marte  
  
 * omeprazole 40 mg capsule También conocido will:  [place] Nap One Touch Delica 33 gauge Misc Medicamento genérico:  lancets USE ONE LANCET TO CHECK BLOOD SUGARS ONCE DAILY  
  
 sucralfate 1 gram tablet También conocido will:  CARAFATE  
  
 * traMADol 50 mg tablet También conocido will:  ULTRAM  
Take 1 Tab by mouth every eight (8) hours as needed for Pain. Max Daily Amount: 150 mg.  
  
 * traMADol 50 mg tablet También conocido will:  ULTRAM  
1 tab PO BID PRN severe pain \"one week supply\" * Aviso:  Esta lista contiene medicamentos que son iguales a otros medicamentos recetados para usted. Mary las instrucciones con cuidado y pida a kate personal médico que revise la lista de medicamentos y las instrucciones correspondientes con usted. Impresion de recetas Refills  
 traMADol (ULTRAM) 50 mg tablet 0 Si tab PO BID PRN severe pain \"one week supply\" Class: Print Hicimos lo siguiente REFERRAL TO PHYSICAL THERAPY [MUD66 Custom] Comentarios:  
 Left scapular pain, eval for dry needling Instrucciones de seguimiento Return if symptoms worsen or fail to improve. Informacion de [employer] de Referencia Referido por Referido a  
  
 [id number] URIOSTEGUI, Georgi Sarah No disponible Visitas Estado Isabela [facility] final  
 1 New Request 17 Si kate referencia tiene un estado de \"pending review\" o \"denied\" , informacion adicional sera enviada para apoyar el resultado de esta decision. Introducing [facility]! Estimado Bertha : 
Juan por solicitar deandre cuenta de Israel ansari. Nuestros registros indican que usted ya tiene deandre cuenta [place]. Usted puede acceder a kate cuenta en cualquier momento en [email or web address] Sabía usted que usted puede acceder a kate hospital y las instrucciones de pita ER en cualquier momento en MyChart ?  También puede revisar [employer] resultados de las pruebas de kate hospitalización o visita a urgencias . Información Adicional 
 
Si tiene alguna pregunta , por favor visite la sección de preguntas frecuentes del sitio web MyChart en [email or web address] . Recuerde, MyChart NO es que se utilizará para las necesidades urgentes. Para emergencias médicas , llame al 911 . Ahora disponible en kate iPhone y Android ! Por favor proporcione rasheed resumen de la documentación de cuidado a kate próximo proveedor. Your primary care clinician is listed as Gay Waterman. If you have any questions after today's visit, please call [phone].

## 2017-11-01 NOTE — PROGRESS NOTES
[address]  [address], [address]  [place] [address]  Phone: [phone]  Fax: [phone]        Cullen Velez  : 1945  PCP: Jessica Yanez MD    PROGRESS NOTE      ASSESSMENT AND PLAN    Diagnoses and all orders for this visit:    1. Chronic scapular pain-intermittent  -     REFERRAL TO PHYSICAL THERAPY    Other orders  -     traMADol (ULTRAM) 50 mg tablet; 1 tab PO BID PRN severe pain \"one week supply\"    1. Advised to continue HEP. 2. One week supply of Tramadol for acute pain. 3. Referral to physical therapy for dry needling. 4. Discussed Thera Cane as treatment option. Follow-up Disposition:  Return if symptoms worsen or fail to improve. HISTORY OF PRESENT ILLNESS  Ju Funk is a 74 y.o. female. Pt last evaluated about 5 months ago for scapular pain, was given Flexeril at that time. She was in the ED 4 days ago for scapular pain, given Tramadol and Lidoderm patch. Pt presents to the office today with recurring scapular pain. She reports relief from her medications. Her pain is mainly felt deep behind her scapula. Pt reports pain does not radiate into her UE. Before he ED visit, she was having 2 weeks of pain, Flexeril not helping. She denies any pain in the office today. Pt does not have weakness in her UE. No chest pain. She did not have shingles in this area. Pt states she has been using Tramadol 50 mg PRN with  relief. Her insurance would not cover the Lidoderm patches. Denies persistent fevers, chills, weight changes, neurogenic bowel or bladder symptoms. Pt denies recent hospitalizations. Symptoms have resolved. Pain Assessment  2017   Location of Pain Back; Other (comment)   Pain Location Comment pain inside below the shoulder and has increasingly gotten worse   Location Modifiers Left   Severity of Pain 4   Quality of Pain Aching; Sharp;Burning   Duration of Pain Persistent   Frequency of Pain Intermittent   Aggravating Factors Other (Comment)   Aggravating Factors Comment patient does not know what flares up pain   Limiting Behavior -   Relieving Factors Heat;Ice;Other (Comment)   Relieving Factors Comment salonpas patch, meds, patient states sometimes it helps, sometimes it does not   Result of Injury No           XR Results (most recent):    Results from Hospital Encounter encounter on 10/28/17   XR CHEST PA LAT   Narrative PA and lateral CXR:    Comparison April 19, 2016    Lungs are hypoinflated with no consolidation or definite pleural effusion. Suspect mild bibasilar atelectasis. Cardiac silhouette and vascularity within  normal limits. Impression IMPRESSION: Limited study due to poor inspiration. PAST MEDICAL HISTORY   Past Medical History:   Diagnosis Date    Alpha thalassemia ([facility] [address] 2/15/2012    Anxiety     BMI 40.0-44.9, adult ([facility] [address] 5/10/2017    Breast tumor     benign cyst    Carotid stenosis, left     LICA <45%    Esophageal stricture     dr. Alexis Levin, s/p balloon dilation 9/2016    Gastric polyps     GERD (gastroesophageal reflux disease)     endoscopy 7/08- hiatal hernia/gastric polyps, esophagitis 9/2016 on prilosec/carafate    Headache(784.0)     Hypertension     nuclear stress test neg 1/2009    Hypertensive retinopathy     IFG (impaired fasting glucose)     Osteopenia        Past Surgical History:   Procedure Laterality Date    HX BREAST BIOPSY  1978    bilateral breasts    HX HYSTERECTOMY     . MEDICATIONS    Current Outpatient Prescriptions   Medication Sig Dispense Refill    traMADol (ULTRAM) 50 mg tablet Take 1 Tab by mouth every eight (8) hours as needed for Pain. Max Daily Amount: 150 mg. 6 Tab 0    hydroCHLOROthiazide (HYDRODIURIL) 25 mg tablet TAKE 1 TAB BY MOUTH DAILY.  90 Tab 0    omeprazole (PRILOSEC) 40 mg capsule       ONE TOUCH DELICA 33 gauge misc USE ONE LANCET TO CHECK BLOOD SUGARS ONCE DAILY 100 Lancet 3    sucralfate (CARAFATE) 1 gram tablet       glucose blood VI test strips (BLOOD GLUCOSE TEST) strip DX: E11.9 DM check fasting blood glucose once daily 200 Strip 2    albuterol (PROVENTIL HFA, VENTOLIN HFA, PROAIR HFA) 90 mcg/actuation inhaler Take 2 Puffs by inhalation every six (6) hours as needed for Wheezing. 1 Inhaler 0    naproxen (NAPROSYN) 500 mg tablet Take 1 Tab by mouth two (2) times daily (with meals). 30 Tab 0    glucose blood VI test strips (ASCENSIA AUTODISC VI, ONE TOUCH ULTRA TEST VI) strip One Touch Ultra Use daily for blood glucose monitoring. DX: E11.9 3 Package 3    Blood-Glucose Meter (ONETOUCH ULTRAMINI) monitoring kit Use daily for glucose monitoring DX E11.9 1 Kit 0    omeprazole (PRILOSEC) 20 mg capsule       fluticasone (FLONASE) 50 mcg/actuation nasal spray       omega-3 fatty acids-vitamin e (FISH OIL) 1,000 mg Cap Take 1 Cap by mouth daily.  multivitamin (ONE A DAY) tablet Take 1 Tab by mouth daily.  lidocaine (LIDODERM) 5 % Apply patch to the affected area for 12 hours a day and remove for 12 hours a day. Indications: Shoulder pain 6 Each 0    cetirizine (ZYRTEC) 10 mg tablet Take 1 Tab by mouth daily. 30 Tab 0        ALLERGIES  Allergies   Allergen Reactions    Bystolic [Nebivolol] Other (comments)     Leg cramps    Norvasc [Amlodipine] Swelling    Pravachol [Pravastatin] Myalgia          SOCIAL HISTORY    Social History     Social History    Marital status:      Spouse name: N/A    Number of children: N/A    Years of education: N/A     Occupational History    Not on file.      Social History Main Topics    Smoking status: Never Smoker    Smokeless tobacco: Never Used    Alcohol use No      Comment: \"Often\"    Drug use: No    Sexual activity: Yes     Partners: Male     Other Topics Concern    Not on file     Social History Narrative       FAMILY HISTORY  Family History   Problem Relation Age of Onset    Cancer Mother        REVIEW OF SYSTEMS  Review of Systems Constitutional: Negative for chills, fever and weight loss. Respiratory: Negative for shortness of breath. Cardiovascular: Negative for chest pain. Gastrointestinal: Negative for constipation. Negative for fecal incontinence   Genitourinary: Negative for dysuria. Negative for urinary incontinence   Musculoskeletal:        Per HPI   Skin: Negative for rash. Neurological: Negative for dizziness, tingling, tremors, focal weakness and headaches. Endo/Heme/Allergies: Does not bruise/bleed easily. Psychiatric/Behavioral: The patient does not have insomnia. PHYSICAL EXAMINATION  Visit Vitals    /80    Pulse 73    Temp 98.3 °F (36.8 °C) (Oral)    Wt 196 lb 12.8 oz (89.3 kg)    SpO2 96%    BMI 39.75 kg/m2         Accompanied by spouse. Constitutional:  Well developed, well nourished, in no acute distress. Psychiatric: Affect and mood are appropriate. Integumentary: No rashes or abrasions noted on exposed areas. Cardiovascular/Peripheral Vascular: Intact l pulses. No peripheral edema is noted. Lymphatic:  No evidence of lymphedema. No cervical lymphadenopathy. SPINE/MUSCULOSKELETAL EXAM    Cervical spine:  Neck is midline. Normal muscle tone. No focal atrophy is noted. Shoulder ROM intact. Negative Spurling's sign. Negative Tinel's sign. Negative Carter's sign. Sensation grossly intact to light touch. No scapular winging, no trigger points, normal motion      MOTOR:      Biceps  Triceps Deltoids Wrist Ext Wrist Flex Hand Intrin   Right +4/5 +4/5 +4/5 +4/5 +4/5 +4/5   Left +4/5 +4/5 +4/5 +4/5 +4/5 +4/5     Ambulation without assistive device. FWB. Written by Jessica Andrew, as dictated by Carrillo Sorensen MD.    I, Dr. Carrillo Sorensen MD, confirm that all documentation is accurate. Ms. Charly Rubin may have a reminder for a \"due or due soon\" health maintenance.  I have asked that she contact her primary care provider for follow-up on this health maintenance.

## 2017-12-01 ENCOUNTER — OFFICE VISIT (OUTPATIENT)
Dept: FAMILY MEDICINE CLINIC | Age: 72
End: 2017-12-01

## 2017-12-01 VITALS
HEIGHT: 59 IN | TEMPERATURE: 98.1 F | DIASTOLIC BLOOD PRESSURE: 98 MMHG | RESPIRATION RATE: 16 BRPM | SYSTOLIC BLOOD PRESSURE: 160 MMHG | OXYGEN SATURATION: 95 % | WEIGHT: 196 LBS | BODY MASS INDEX: 39.51 KG/M2 | HEART RATE: 67 BPM

## 2017-12-01 DIAGNOSIS — I10 WHITE COAT SYNDROME WITH DIAGNOSIS OF HYPERTENSION: ICD-10-CM

## 2017-12-01 DIAGNOSIS — E78.00 PURE HYPERCHOLESTEROLEMIA: ICD-10-CM

## 2017-12-01 DIAGNOSIS — Z23 ENCOUNTER FOR IMMUNIZATION: ICD-10-CM

## 2017-12-01 DIAGNOSIS — E66.01 SEVERE OBESITY (BMI 35.0-39.9): ICD-10-CM

## 2017-12-01 DIAGNOSIS — I10 ESSENTIAL HYPERTENSION: Primary | ICD-10-CM

## 2017-12-01 DIAGNOSIS — R73.01 IFG (IMPAIRED FASTING GLUCOSE): ICD-10-CM

## 2017-12-01 NOTE — PROGRESS NOTES
Yoselin Chavarria, 74 y.o.,  female    SUBJECTIVE  Routine ff-up    HTN- compliant with HCTZ, says BP readings are higher in office than home readings 110-120/60's. We have compared this with home BP in the past and monitor at home is accurate. HL- says intolerant to lipitor/pravachol pt reports muscle aches. Says follows healthy diet. She is sedentary. Reviewed labs    Prediabetes- checks her sugars 90- 100's. Says tries to follow diet      GERD- per pt doing well, no heartburn on prilosec. Previous EGD dilation and dysphagia has improved. ROS:  See HPI, all others negative        Patient Active Problem List   Diagnosis Code    IFG (impaired fasting glucose) R73.01    Hyperlipidemia E78.5    GERD (gastroesophageal reflux disease) K21.9    Alpha thalassemia (HCC) D56.0    Obesity E66.9    Carotid stenosis, left I65.22    Osteopenia M85.80    Anxiety F41.9    Hypertensive retinopathy H35.039    Essential hypertension I10    Advance directive discussed with patient-POA Brittany Monday Z71.89    BMI 40.0-44.9, adult (HCC) Z68.41    Chronic scapular pain-intermittent M89.8X1, G89.29       Current Outpatient Prescriptions   Medication Sig Dispense Refill    traMADol (ULTRAM) 50 mg tablet 1 tab PO BID PRN severe pain \"one week supply\" 14 Tab 0    traMADol (ULTRAM) 50 mg tablet Take 1 Tab by mouth every eight (8) hours as needed for Pain. Max Daily Amount: 150 mg. 6 Tab 0    hydroCHLOROthiazide (HYDRODIURIL) 25 mg tablet TAKE 1 TAB BY MOUTH DAILY. 90 Tab 0    omeprazole (PRILOSEC) 40 mg capsule       ONE TOUCH DELICA 33 gauge misc USE ONE LANCET TO CHECK BLOOD SUGARS ONCE DAILY 100 Lancet 3    sucralfate (CARAFATE) 1 gram tablet       glucose blood VI test strips (BLOOD GLUCOSE TEST) strip DX: E11.9 DM check fasting blood glucose once daily 200 Strip 2    naproxen (NAPROSYN) 500 mg tablet Take 1 Tab by mouth two (2) times daily (with meals).  30 Tab 0    cetirizine (ZYRTEC) 10 mg tablet Take 1 Tab by mouth daily. 30 Tab 0    glucose blood VI test strips (ASCENSIA AUTODISC VI, ONE TOUCH ULTRA TEST VI) strip One Touch Ultra Use daily for blood glucose monitoring. DX: E11.9 3 Package 3    Blood-Glucose Meter (ONETOUCH ULTRAMINI) monitoring kit Use daily for glucose monitoring DX E11.9 1 Kit 0    fluticasone (FLONASE) 50 mcg/actuation nasal spray       omega-3 fatty acids-vitamin e (FISH OIL) 1,000 mg Cap Take 1 Cap by mouth daily.  multivitamin (ONE A DAY) tablet Take 1 Tab by mouth daily. Allergies   Allergen Reactions    Bystolic [Nebivolol] Other (comments)     Leg cramps    Norvasc [Amlodipine] Swelling    Pravachol [Pravastatin] Myalgia       Past Medical History:   Diagnosis Date    [facility]assemia ([facility] 2/15/2012    Anxiety     BMI 40.0-44.9, adult ([facility] 5/10/2017    Breast tumor     benign cyst    Carotid stenosis, left     LICA <00%    Esophageal stricture     dr. Carlos Ledezma, s/p balloon dilation 9/2016    Gastric polyps     GERD (gastroesophageal reflux disease)     endoscopy 7/08- hiatal hernia/gastric polyps, esophagitis 9/2016 on prilosec/carafate    Headache(784.0)     Hypertension     nuclear stress test neg 1/2009    Hypertensive retinopathy     IFG (impaired fasting glucose)     Osteopenia        Social History     Social History    Marital status:      Spouse name: N/A    Number of children: N/A    Years of education: N/A     Occupational History    Not on file.      Social History Main Topics    Smoking status: Never Smoker    Smokeless tobacco: Never Used    Alcohol use No      Comment: \"Often\"    Drug use: No    Sexual activity: Yes     Partners: Male     Other Topics Concern    Not on file     Social History Narrative       Family History   Problem Relation Age of Onset    Cancer Mother          OBJECTIVE    Physical Exam:     Visit Vitals    BP (!) 160/98 (BP 1 Location: Left arm, BP Patient Position: Sitting)    Pulse 67    Temp 98.1 °F (36.7 °C) (Oral)    Resp 16    Ht 4' 11\" (1.499 m)    Wt 196 lb (88.9 kg)    SpO2 95%    BMI 39.59 kg/m2       General: alert, n no apparent distress or pain  CVS: normal rate, regular rhythm, distinct S1 and S2  Lungs:clear to ausculation bilaterally, no crackles, wheezing or rhonchi noted  Skin: warm, no lesions, rashes noted  Psych:  mood and affect normal        ASSESSMENT/PLAN  Bertha was seen today for hypertension, cholesterol problem, other and annual wellness visit. Diagnoses and associated orders for this visit:    Prediabetes-   a1c 5.8>6.2>5.8>5.9, cont TLCs, monitoring  Check a1c/CMP/Lipid panel prior to next visit    Hyperlipidemia-   intolerant to lipitor/pravachol , calculated 10 yr cv risk 16%,   She does not want to try another statin, advised low fat diet  Recheck    Essential hypertension-  elevated today, improved with recheck. pt does white coat phenomenon, will cont to monitor  Monitoring, cont HCTZ, i advised against decreasing dose  BRING BP MONITOR ON NEXT VISIT TO COMPARE    Osteopenia-  update 10/2017, cont vit D/ Ca RDA  Will order on next visit    GERD-   stable on PPI    BMI 39  Discussed weight loss strategies, limit carbs rice/avoid apple juice    Encounter for vaccine  Flu vaccine today    Follow-up Disposition:  Return in about 2 weeks (around 12/15/2017), or if symptoms worsen or fail to improve. Patient understands plan of care. Patient has provided input and agrees with goals.

## 2017-12-01 NOTE — PATIENT INSTRUCTIONS

## 2017-12-01 NOTE — MR AVS SNAPSHOT
Visit Information Nhi Germain Personal Médico Departamento Teléfono del Dep. Número de visita 12/1/2017 10:30 AM Eren Calderon, [address] [phone] Follow-up Instructions Return in about 2 weeks (around 12/15/2017), or if symptoms worsen or fail to improve. Upcoming Health Maintenance Date Due  
 GLAUCOMA SCREENING Q2Y 12/7/2017 MEDICARE YEARLY EXAM 3/28/2018 COLONOSCOPY 1/1/2021 DTaP/Tdap/Td series (2 - Td) 9/26/2026 Alergias  Review Complete El: 12/1/2017 Por: MD Debbie Martines [place] del:  12/1/2017 Intensidad Anotado Tipo de reacción [employer] Bystolic [Nebivolol]  06/39/7806    Other (comments) Leg cramps Norvasc [Amlodipine]  02/15/2012    Swelling Pravachol [Pravastatin]  05/03/2016   Side Effect Myalgia Vacunas actuales Revisadas el:  9/26/2016 Zina Silva Influenza High Dose Vaccine PF 12/1/2017, 9/26/2016 Influenza Vaccine 10/14/2014, 10/29/2013 Influenza Vaccine (Quad) PF 10/19/2015 Influenza Vaccine Split 10/2/2012 Pneumococcal Conjugate (PCV-13) 10/19/2015 ZZZ-RETIRED (DO NOT USE) Pneumococcal Vaccine (Unspecified Type) 1/1/2010 Zoster Vaccine, Live 1/1/2010 No revisadas esta visita You Were Diagnosed With   
  
 Jazmine Garay Essential hypertension    -  Primary ICD-10-CM: I10 
ICD-9-CM: 401.9 Encounter for immunization     ICD-10-CM: E78 ICD-9-CM: V03.89 BMI 39.0-39.9,adult     ICD-10-CM: L80.66 ICD-9-CM: V85.39 White coat syndrome with diagnosis of hypertension     ICD-10-CM: I10 
ICD-9-CM: 401.9 IFG (impaired fasting glucose)     ICD-10-CM: R73.01 
ICD-9-CM: 790.21 Pure hypercholesterolemia     ICD-10-CM: E78.00 ICD-9-CM: 272.0 Partes vitales PS Pulso Temperatura Resp [place] ( percentil de crecimiento) Peso (percentil de crecimiento) (!) 184/100 (BP 1 Location: Left arm, BP Patient Position: Sitting) 67 98.1 °F (36.7 °C) (Oral) 16 4' 11\" (1.499 m) 196 lb (88.9 kg) SpO2 BMI (IMC) Estado obstétrico Estatus de tabaquísmo 95% 39.59 kg/m2 Hysterectomy Never Smoker BMI and BSA Data Body Mass Index Body Surface Area  
 39.59 kg/m 2 1.92 m 2 [facility] Name Phone [facility] [address] [place] [address] [phone] Woody lista de medicamentos actualizada Lista actualizada el: 12/1/17 11:08 AM.  Josiah Carrizales use woody lista de medicamentos más reciente. Blood-Glucose Meter monitoring kit También conocido will:  Fernando Miles Use daily for glucose monitoring DX E11.9  
  
 cetirizine 10 mg tablet También conocido will:  ZyrTEC Take 1 Tab by mouth daily. FISH OIL 1,000 mg Cap Medicamento genérico:  omega-3 fatty acids-vitamin e Take 1 Cap by mouth daily. fluticasone 50 mcg/actuation nasal spray También conocido will:  FLONASE  
  
 * glucose blood VI test strips strip También conocido will:  ASCENSIA AUTODISC VI, ONE TOUCH ULTRA TEST VI One Touch Ultra Use daily for blood glucose monitoring. DX: E11.9  
  
 * glucose blood VI test strips strip También conocido will:  blood glucose test  
DX: E11.9 DM check fasting blood glucose once daily  
  
 hydroCHLOROthiazide 25 mg tablet También conocido will:  HYDRODIURIL  
TAKE 1 TAB BY MOUTH DAILY. multivitamin tablet También conocido will:  ONE A DAY Take 1 Tab by mouth daily. naproxen 500 mg tablet También conocido will:  NAPROSYN Take 1 Tab by mouth two (2) times daily (with meals). omeprazole 40 mg capsule También conocido will:  Lala Buggy One Touch Delica 33 gauge Misc Medicamento genérico:  lancets USE ONE LANCET TO CHECK BLOOD SUGARS ONCE DAILY  
  
 sucralfate 1 gram tablet También conocido will:  Jair Ruiz  
  
 * traMADol 50 mg tablet También conocido iwll:  ULTRAM  
Take 1 Tab by mouth every eight (8) hours as needed for Pain. Max Daily Amount: 150 mg.  
  
 * traMADol 50 mg tablet También conocido will:  ULTRAM  
1 tab PO BID PRN severe pain \"one week supply\" * Aviso:  Esta lista contiene medicamentos que son iguales a otros medicamentos recetados para usted. Mary las instrucciones con cuidado y pida a kate personal médico que revise la lista de medicamentos y las instrucciones correspondientes con usted. Hicimos lo siguiente ADMIN INFLUENZA VIRUS VAC [ HCP] INFLUENZA VIRUS VACCINE, HIGH DOSE SEASONAL, PRESERVATIVE FREE [95805 CPT(R)] Instrucciones de seguimiento Return in about 2 weeks (around 12/15/2017), or if symptoms worsen or fail to improve. Por hacer 12/01/2017 Lab:  HEMOGLOBIN A1C W/O EAG   
  
 12/01/2017 Lab:  LIPID PANEL   
  
 12/01/2017 Lab:  METABOLIC PANEL, COMPREHENSIVE Instrucciones para el Paciente DASH Diet: Care Instructions Your Care Instructions The DASH diet is an eating plan that can help lower your blood pressure. DASH stands for Dietary Approaches to Stop Hypertension. Hypertension is high blood pressure. The DASH diet focuses on eating foods that are high in calcium, potassium, and magnesium. These nutrients can lower blood pressure. The foods that are highest in these nutrients are fruits, vegetables, low-fat dairy products, nuts, seeds, and legumes. But taking calcium, potassium, and magnesium supplements instead of eating foods that are high in those nutrients does not have the same effect. The DASH diet also includes whole grains, fish, and poultry. The DASH diet is one of several lifestyle changes your doctor may recommend to lower your high blood pressure. Your doctor may also want you to decrease the amount of sodium in your diet.  Lowering sodium while following the DASH diet can lower blood pressure even further than just the DASH diet alone. Follow-up care is a key part of your treatment and safety. Be sure to make and go to all appointments, and call your doctor if you are having problems. It's also a good idea to know your test results and keep a list of the medicines you take. How can you care for yourself at home? Following the DASH diet · Eat 4 to 5 servings of fruit each day. A serving is 1 medium-sized piece of fruit, ½ cup chopped or canned fruit, 1/4 cup dried fruit, or 4 ounces (½ cup) of fruit juice. Choose fruit more often than fruit juice. · Eat 4 to 5 servings of vegetables each day. A serving is 1 cup of lettuce or raw leafy vegetables, ½ cup of chopped or cooked vegetables, or 4 ounces (½ cup) of vegetable juice. Choose vegetables more often than vegetable juice. · Get 2 to 3 servings of low-fat and fat-free dairy each day. A serving is 8 ounces of milk, 1 cup of yogurt, or 1 ½ ounces of cheese. · Eat 6 to 8 servings of grains each day. A serving is 1 slice of bread, 1 ounce of dry cereal, or ½ cup of cooked rice, pasta, or cooked cereal. Try to choose whole-grain products as much as possible. · Limit lean meat, poultry, and fish to 2 servings each day. A serving is 3 ounces, about the size of a deck of cards. · Eat 4 to 5 servings of nuts, seeds, and legumes (cooked dried beans, lentils, and split peas) each week. A serving is 1/3 cup of nuts, 2 tablespoons of seeds, or ½ cup of cooked beans or peas. · Limit fats and oils to 2 to 3 servings each day. A serving is 1 teaspoon of vegetable oil or 2 tablespoons of salad dressing. · Limit sweets and added sugars to 5 servings or less a week. A serving is 1 tablespoon jelly or jam, ½ cup sorbet, or 1 cup of lemonade. · Eat less than 2,300 milligrams (mg) of sodium a day. If you limit your sodium to 1,500 mg a day, you can lower your blood pressure even more. Tips for success · Start small. Do not try to make dramatic changes to your diet all at once. You might feel that you are missing out on your favorite foods and then be more likely to not follow the plan. Make small changes, and stick with them. Once those changes become habit, add a few more changes. · Try some of the following: ¨ Make it a goal to eat a fruit or vegetable at every meal and at snacks. This will make it easy to get the recommended amount of fruits and vegetables each day. ¨ Try yogurt topped with fruit and nuts for a snack or healthy dessert. ¨ Add lettuce, tomato, cucumber, and onion to sandwiches. ¨ Combine a ready-made pizza crust with low-fat mozzarella cheese and lots of vegetable toppings. Try using tomatoes, squash, spinach, broccoli, carrots, cauliflower, and onions. ¨ Have a variety of cut-up vegetables with a low-fat dip as an appetizer instead of chips and dip. ¨ Sprinkle sunflower seeds or chopped almonds over salads. Or try adding chopped walnuts or almonds to cooked vegetables. ¨ Try some vegetarian meals using beans and peas. Add garbanzo or kidney beans to salads. Make burritos and tacos with mashed elizabeth beans or black beans. Where can you learn more? Go to http://michael-allyssa.info/. Enter M152 in the search box to learn more about \"DASH Diet: Care Instructions. \" Current as of: September 21, 2016 Content Version: 11.4 © 4517-3311 [employer]. Care instructions adapted under license by to be (which disclaims liability or warranty for this information). If you have questions about a medical condition or this instruction, always ask your healthcare professional. [place] [address] any warranty or liability for your use of this information. Introducing [facility]! Estimado Bertha : 
Juan por solicitar deandre cuenta de MyChart usted.  Nuestros registros indican que usted ya tiene deandre cuenta [facility]. Usted puede acceder a kate cuenta en cualquier momento en [email or web address] Box Overstocks. [email or web address] Sabía usted que usted puede acceder a kate hospital y las instrucciones de pita ER en cualquier momento en MyChart ? También puede revisar [facility] de las pruebas de kate hospitalización o visita a urgencias . Información Adicional 
 
Si tiene alguna pregunta , por favor visite la sección de preguntas frecuentes del sitio web Sellobuyhart en [email or web address]. TheySay/[employer] Box Overstocks/ . Recuerde, [employer] NO es que se utilizará para las necesidades urgentes. Para emergencias médicas , llame al 911 . Ahora disponible en kate iPhone y Android ! Por favor proporcione rasheed resumen de la documentación de cuidado a kate próximo proveedor. Your primary care clinician is listed as Jesus Halls. If you have any questions after today's visit, please call [phone].

## 2017-12-01 NOTE — PROGRESS NOTES
Chief Complaint   Patient presents with    Hypertension    Cholesterol Problem    Other     IFG    GERD     1. Have you been to the ER, urgent care clinic since your last visit? Hospitalized since your last visit? Yes When: 10/28/17 Where: [facility] Reason for visit: Back pain    2. Have you seen or consulted any other health care providers outside of the [address] [place] [address] since your last visit? Include any pap smears or colon screening. No    Patient presents for High Dose flu vaccine. Consent obtained, Tolerated procedure well at right deltoid. Patient remained in office 15 minutes post vaccine.  No side effects noted    Lot# [id number]  Exp: 05/13/18  [facility]  [facility]: [phone]

## 2017-12-11 ENCOUNTER — HOSPITAL ENCOUNTER (OUTPATIENT)
Dept: LAB | Age: 72
Discharge: HOME | End: 2017-12-11
Payer: MEDICARE

## 2017-12-11 DIAGNOSIS — I10 ESSENTIAL HYPERTENSION: ICD-10-CM

## 2017-12-11 DIAGNOSIS — R73.01 IFG (IMPAIRED FASTING GLUCOSE): ICD-10-CM

## 2017-12-11 DIAGNOSIS — E78.00 PURE HYPERCHOLESTEROLEMIA: ICD-10-CM

## 2017-12-11 LAB
ALBUMIN SERPL-MCNC: 3.6 G/DL (ref 3.4–5)
ALBUMIN/GLOB SERPL: 0.8 {RATIO} (ref 0.8–1.7)
ALP SERPL-CCNC: 87 U/L (ref 45–117)
ALT SERPL-CCNC: 25 U/L (ref 13–56)
ANION GAP SERPL CALC-SCNC: 6 MMOL/L (ref 3–18)
AST SERPL-CCNC: 14 U/L (ref 15–37)
BILIRUB SERPL-MCNC: 0.5 MG/DL (ref 0.2–1)
BUN SERPL-MCNC: 15 MG/DL (ref 7–18)
BUN/CREAT SERPL: 17 (ref 12–20)
CALCIUM SERPL-MCNC: 8.8 MG/DL (ref 8.5–10.1)
CHLORIDE SERPL-SCNC: 102 MMOL/L (ref 100–108)
CHOLEST SERPL-MCNC: 219 MG/DL
CO2 SERPL-SCNC: 32 MMOL/L (ref 21–32)
CREAT SERPL-MCNC: 0.87 MG/DL (ref 0.6–1.3)
GLOBULIN SER CALC-MCNC: 4.4 G/DL (ref 2–4)
GLUCOSE SERPL-MCNC: 98 MG/DL (ref 74–99)
HBA1C MFR BLD: 5.8 % (ref 4.2–5.6)
HDLC SERPL-MCNC: 53 MG/DL (ref 40–60)
HDLC SERPL: 4.1 {RATIO} (ref 0–5)
LDLC SERPL CALC-MCNC: 137.6 MG/DL (ref 0–100)
LIPID PROFILE,FLP: ABNORMAL
POTASSIUM SERPL-SCNC: 4.4 MMOL/L (ref 3.5–5.5)
PROT SERPL-MCNC: 8 G/DL (ref 6.4–8.2)
SODIUM SERPL-SCNC: 140 MMOL/L (ref 136–145)
TRIGL SERPL-MCNC: 142 MG/DL (ref ?–150)
VLDLC SERPL CALC-MCNC: 28.4 MG/DL

## 2017-12-11 PROCEDURE — 80053 COMPREHEN METABOLIC PANEL: CPT | Performed by: FAMILY MEDICINE

## 2017-12-11 PROCEDURE — 83036 HEMOGLOBIN GLYCOSYLATED A1C: CPT | Performed by: FAMILY MEDICINE

## 2017-12-11 PROCEDURE — 36415 COLL VENOUS BLD VENIPUNCTURE: CPT | Performed by: FAMILY MEDICINE

## 2017-12-11 PROCEDURE — 80061 LIPID PANEL: CPT | Performed by: FAMILY MEDICINE

## 2017-12-12 NOTE — PROGRESS NOTES
Prediabetes is persistent, cholesterol is elevated, pls keep appt  This month, with BP log and to bring BP monitor. pls notify pt.

## 2017-12-12 NOTE — PROGRESS NOTES
Patient identified with 2 identifiers (name and ). Patient aware of prediabetes persistent. Cholesterol still elevated.  Reminded to bring BP log and BP monitor

## 2017-12-18 ENCOUNTER — OFFICE VISIT (OUTPATIENT)
Dept: FAMILY MEDICINE CLINIC | Age: 72
End: 2017-12-18

## 2017-12-18 VITALS
TEMPERATURE: 98.2 F | RESPIRATION RATE: 16 BRPM | HEIGHT: 59 IN | HEART RATE: 80 BPM | BODY MASS INDEX: 38.71 KG/M2 | OXYGEN SATURATION: 98 % | DIASTOLIC BLOOD PRESSURE: 100 MMHG | WEIGHT: 192 LBS | SYSTOLIC BLOOD PRESSURE: 198 MMHG

## 2017-12-18 DIAGNOSIS — E78.00 PURE HYPERCHOLESTEROLEMIA: ICD-10-CM

## 2017-12-18 DIAGNOSIS — Z78.0 POST-MENOPAUSAL: ICD-10-CM

## 2017-12-18 DIAGNOSIS — M85.80 OSTEOPENIA, UNSPECIFIED LOCATION: ICD-10-CM

## 2017-12-18 DIAGNOSIS — R73.01 IFG (IMPAIRED FASTING GLUCOSE): ICD-10-CM

## 2017-12-18 DIAGNOSIS — E66.01 SEVERE OBESITY (BMI 35.0-39.9): ICD-10-CM

## 2017-12-18 DIAGNOSIS — I10 ESSENTIAL HYPERTENSION: Primary | ICD-10-CM

## 2017-12-18 DIAGNOSIS — K21.9 GASTROESOPHAGEAL REFLUX DISEASE, ESOPHAGITIS PRESENCE NOT SPECIFIED: ICD-10-CM

## 2017-12-18 RX ORDER — LISINOPRIL 20 MG/1
20 TABLET ORAL DAILY
Qty: 90 TAB | Refills: 0 | Status: SHIPPED | OUTPATIENT
Start: 2017-12-18 | End: 2018-06-19

## 2017-12-18 NOTE — PROGRESS NOTES
Prince Dionte, 74 y.o.,  female    SUBJECTIVE  Routine ff-up    HTN- reviewed BP monitor personal 171/86 vs office 198/100. Says taking HCTZ. She reports being on multiple hypertensives in the past, and cut out BP meds due to hypotension/ and  white coat hypertension. There is note of hypertensive retinopathy    HL- says intolerant to lipitor/pravachol pt reports muscle aches. Says follows healthy diet. She is sedentary. Reviewed labs    Prediabetes- checks her sugars 90- 100's. Says tries to follow diet. Reviewed labs    GERD- per pt doing well, no heartburn on prilosec. Previous EGD dilation and dysphagia has improved. ROS:  See HPI, all others negative        Patient Active Problem List   Diagnosis Code    IFG (impaired fasting glucose) R73.01    Hyperlipidemia E78.5    GERD (gastroesophageal reflux disease) K21.9    Alpha thalassemia ([facility]) D56.0    Obesity E66.9    Carotid stenosis, left I65.22    Osteopenia M85.80    Anxiety F41.9    Hypertensive retinopathy H35.039    Essential hypertension I10    Advance directive discussed with patient-DARIUSZ Mon Z71.89    Chronic scapular pain-intermittent M89.8X1, G89.29    Severe obesity (BMI 35.0-39.9) ([facility]) E66.01       Current Outpatient Prescriptions   Medication Sig Dispense Refill    glucose blood VI test strips (BLOOD GLUCOSE TEST) strip DX: E11.9 DM check fasting blood glucose once daily 200 Strip 2    lisinopril (PRINIVIL, ZESTRIL) 20 mg tablet Take 1 Tab by mouth daily. 90 Tab 0    hydroCHLOROthiazide (HYDRODIURIL) 25 mg tablet TAKE 1 TABLET EVERY DAY 90 Tab 1    traMADol (ULTRAM) 50 mg tablet Take 1 Tab by mouth every eight (8) hours as needed for Pain.  Max Daily Amount: 150 mg. 6 Tab 0    omeprazole (PRILOSEC) 40 mg capsule       ONE TOUCH DELICA 33 gauge misc USE ONE LANCET TO CHECK BLOOD SUGARS ONCE DAILY 100 Lancet 3    sucralfate (CARAFATE) 1 gram tablet       naproxen (NAPROSYN) 500 mg tablet Take 1 Tab by mouth two (2) times daily (with meals). 30 Tab 0    cetirizine (ZYRTEC) 10 mg tablet Take 1 Tab by mouth daily. 30 Tab 0    glucose blood VI test strips (ASCENSIA AUTODISC VI, ONE TOUCH ULTRA TEST VI) strip One Touch Ultra Use daily for blood glucose monitoring. DX: E11.9 3 Package 3    Blood-Glucose Meter (ONETOUCH ULTRAMINI) monitoring kit Use daily for glucose monitoring DX E11.9 1 Kit 0    fluticasone (FLONASE) 50 mcg/actuation nasal spray       omega-3 fatty acids-vitamin e (FISH OIL) 1,000 mg Cap Take 1 Cap by mouth daily.  multivitamin (ONE A DAY) tablet Take 1 Tab by mouth daily.  traMADol (ULTRAM) 50 mg tablet 1 tab PO BID PRN severe pain \"one week supply\" 14 Tab 0       Allergies   Allergen Reactions    Bystolic [Nebivolol] Other (comments)     Leg cramps    Norvasc [Amlodipine] Swelling    Pravachol [Pravastatin] Myalgia       Past Medical History:   Diagnosis Date    Alpha thalassemia ([facility] 2/15/2012    Anxiety     BMI 40.0-44.9, adult ([facility] 5/10/2017    Breast tumor     benign cyst    Carotid stenosis, left     LICA <91%    Esophageal stricture     dr. Janeth Gray, s/p balloon dilation 9/2016    Gastric polyps     GERD (gastroesophageal reflux disease)     endoscopy 7/08- hiatal hernia/gastric polyps, esophagitis 9/2016 on prilosec/carafate    Headache(784.0)     Hypertension     nuclear stress test neg 1/2009    Hypertensive retinopathy     IFG (impaired fasting glucose)     Osteopenia        Social History     Social History    Marital status:      Spouse name: N/A    Number of children: N/A    Years of education: N/A     Occupational History    Not on file.      Social History Main Topics    Smoking status: Never Smoker    Smokeless tobacco: Never Used    Alcohol use No      Comment: \"Often\"    Drug use: No    Sexual activity: Yes     Partners: Male     Other Topics Concern    Not on file     Social History Narrative       Family History   Problem Relation Age of Onset    Cancer Mother          OBJECTIVE    Physical Exam:     Visit Vitals    BP (!) 198/100 (BP 1 Location: Left arm, BP Patient Position: Sitting)    Pulse 80    Temp 98.2 °F (36.8 °C) (Oral)    Resp 16    Ht 4' 11\" (1.499 m)    Wt 192 lb (87.1 kg)    SpO2 98%    BMI 38.78 kg/m2       General: alert, n no apparent distress or pain  CVS: normal rate, regular rhythm, distinct S1 and S2  Lungs:clear to ausculation bilaterally, no crackles, wheezing or rhonchi noted  Skin: warm, no lesions, rashes noted  Psych:  mood and affect normal    Results for orders placed or performed during the hospital encounter of 12/11/17   HEMOGLOBIN A1C W/O EAG   Result Value Ref Range    Hemoglobin A1c 5.8 (H) 4.2 - 5.6 %   LIPID PANEL   Result Value Ref Range    LIPID PROFILE          Cholesterol, total 219 (H) <200 MG/DL    Triglyceride 142 <150 MG/DL    HDL Cholesterol 53 40 - 60 MG/DL    LDL, calculated 137.6 (H) 0 - 100 MG/DL    VLDL, calculated 28.4 MG/DL    CHOL/HDL Ratio 4.1 0 - 5.0     METABOLIC PANEL, COMPREHENSIVE   Result Value Ref Range    Sodium 140 136 - 145 mmol/L    Potassium 4.4 3.5 - 5.5 mmol/L    Chloride 102 100 - 108 mmol/L    CO2 32 21 - 32 mmol/L    Anion gap 6 3.0 - 18 mmol/L    Glucose 98 74 - 99 mg/dL    BUN 15 7.0 - 18 MG/DL    Creatinine 0.87 0.6 - 1.3 MG/DL    BUN/Creatinine ratio 17 12 - 20      GFR est AA >60 >60 ml/min/1.73m2    GFR est non-AA >60 >60 ml/min/1.73m2    Calcium 8.8 8.5 - 10.1 MG/DL    Bilirubin, total 0.5 0.2 - 1.0 MG/DL    ALT (SGPT) 25 13 - 56 U/L    AST (SGOT) 14 (L) 15 - 37 U/L    Alk. phosphatase 87 45 - 117 U/L    Protein, total 8.0 6.4 - 8.2 g/dL    Albumin 3.6 3.4 - 5.0 g/dL    Globulin 4.4 (H) 2.0 - 4.0 g/dL    A-G Ratio 0.8 0.8 - 1.7           ASSESSMENT/PLAN  Bertha was seen today for hypertension, cholesterol problem, other and annual wellness visit.     Diagnoses and associated orders for this visit:  Essential hypertension-  elevated today, improves  with recheck. However still reading higher than personal BP, advised to change BP monitor  pt does white coat phenomenon   cont HCTZ, will add lisinopril  They will be out of town for few months, advise ff-up upon return  Advise to check BP at home with new monitor, and call if BP <100/60  BRING BP MONITOR ON NEXT VISIT TO COMPARE    Prediabetes-   a1c 5.8>6.2>5.8>5.9>5.8   cont TLCs, monitoring    Hyperlipidemia-   intolerant to lipitor/pravachol , calculated 10 yr cv risk 16%,   She does not want to try another statin, advised low fat diet  Recheck      Osteopenia-   cont vit D/ Ca RDA  Update dexa soon    GERD-   stable on PPI    BMI 38  Discussed weight loss strategies, limit carbs rice/avoid apple juice    Post menopausal  DEXA scan    Follow-up Disposition:  Return in about 3 months (around 3/18/2018), or if symptoms worsen or fail to improve. Patient understands plan of care. Patient has provided input and agrees with goals.

## 2017-12-18 NOTE — PROGRESS NOTES
1. Have you been to the ER, urgent care clinic since your last visit? Hospitalized since your last visit? No    2. Have you seen or consulted any other health care providers outside of the [address] [place] [address] since your last visit? Include any pap smears or colon screening.  No

## 2017-12-18 NOTE — PATIENT INSTRUCTIONS

## 2017-12-18 NOTE — MR AVS SNAPSHOT
Visit Information Aly Solo Personal Médico Departamento Teléfono del Dep. Número de visita 12/18/2017  1:45 PM [address], [address] [place] [address] [phone] Follow-up Instructions Return in about 3 months (around 3/18/2018), or if symptoms worsen or fail to improve. Upcoming Health Maintenance Date Due  
 GLAUCOMA SCREENING Q2Y 12/7/2017 MEDICARE YEARLY EXAM 3/28/2018 COLONOSCOPY 1/1/2021 DTaP/Tdap/Td series (2 - Td) 9/26/2026 Alergias  Review Complete El: 12/18/2017 Por: 40 MD Azra Knowles del:  12/18/2017 Intensidad Anotado Tipo de reacción [employer] Bystolic [Nebivolol]  80/26/0853    Other (comments) Leg cramps Norvasc [Amlodipine]  02/15/2012    Swelling Pravachol [Pravastatin]  05/03/2016   Side Effect Myalgia Vacunas actuales Revisadas el:  9/26/2016 Nadeem Nuñez Influenza High Dose Vaccine PF 12/1/2017, 9/26/2016 Influenza Vaccine 10/14/2014, 10/29/2013 Influenza Vaccine (Quad) PF 10/19/2015 Influenza Vaccine Split 10/2/2012 Pneumococcal Conjugate (PCV-13) 10/19/2015 ZZZ-RETIRED (DO NOT USE) Pneumococcal Vaccine (Unspecified Type) 1/1/2010 Zoster Vaccine, Live 1/1/2010 No revisadas esta visita You Were Diagnosed With   
  
 Amy Enrique Severe obesity (BMI 35.0-39.9) (HCC)    -  Primary ICD-10-CM: E66.01 
ICD-9-CM: 278.01   
 IFG (impaired fasting glucose)     ICD-10-CM: R73.01 
ICD-9-CM: 790.21 Pure hypercholesterolemia     ICD-10-CM: E78.00 ICD-9-CM: 272.0 Gastroesophageal reflux disease, esophagitis presence not specified     ICD-10-CM: K21.9 ICD-9-CM: 530.81 Partes vitales PS Pulso Temperatura Resp [place] ( percentil de crecimiento) Peso (percentil de crecimiento) (!) 198/100 (BP 1 Location: Left arm, BP Patient Position: Sitting) 80 98.2 °F (36.8 °C) (Oral) 16 4' 11\" (1.499 m) 192 lb (87.1 kg) SpO2 BMI (IMC) Estado obstétrico Estatus de tabaquísmo 98% 38.78 kg/m2 Hysterectomy Never Smoker Historial de signos vitales BMI and BSA Data Body Mass Index Body Surface Area 38.78 kg/m 2 1.9 m 2 Qiana Sullivan Pharmacy Name Phone [facility] [address] [place] [phone] Woody lista de medicamentos actualizada Lista actualizada el: 12/18/17  2:22 PM.  Siempre use woody lista de medicamentos más reciente. Blood-Glucose Meter monitoring kit También conocido will:  Oleta Braden Use daily for glucose monitoring DX E11.9  
  
 cetirizine 10 mg tablet También conocido will:  ZyrTEC Take 1 Tab by mouth daily. FISH OIL 1,000 mg Cap Medicamento genérico:  omega-3 fatty acids-vitamin e Take 1 Cap by mouth daily. fluticasone 50 mcg/actuation nasal spray También conocido will:  FLONASE  
  
 * glucose blood VI test strips strip También conocido will:  ASCENSIA AUTODISC VI, ONE TOUCH ULTRA TEST VI One Touch Ultra Use daily for blood glucose monitoring. DX: E11.9  
  
 * glucose blood VI test strips strip También conocido will:  blood glucose test  
DX: E11.9 DM check fasting blood glucose once daily  
  
 hydroCHLOROthiazide 25 mg tablet También conocido wlil:  HYDRODIURIL  
TAKE 1 TABLET EVERY DAY  
  
 lisinopril 20 mg tablet También conocido will:  Elliott [place] Take 1 Tab by mouth daily. multivitamin tablet También conocido will:  ONE A DAY Take 1 Tab by mouth daily. naproxen 500 mg tablet También conocido will:  NAPROSYN Take 1 Tab by mouth two (2) times daily (with meals). omeprazole 40 mg capsule También conocido will:  Alicia Majano One Touch Delica 33 gauge Misc Medicamento genérico:  lancets USE ONE LANCET TO CHECK BLOOD SUGARS ONCE DAILY  
  
 sucralfate 1 gram tablet También conocido will:  CARAFATE  
  
 * traMADol 50 mg tablet También conocido will:  ULTRAM  
Take 1 Tab by mouth every eight (8) hours as needed for Pain. Max Daily Amount: 150 mg.  
  
 * traMADol 50 mg tablet También conocido will:  ULTRAM  
1 tab PO BID PRN severe pain \"one week supply\" * Aviso:  Esta lista contiene medicamentos que son iguales a otros medicamentos recetados para usted. Mary las instrucciones con cuidado y pida a kate personal médico que revise la lista de medicamentos y las instrucciones correspondientes con usted. Recetas Enviado a la [place] Refills  
 glucose blood VI test strips (BLOOD GLUCOSE TEST) strip 2 Sig: DX: E11.9 DM check fasting blood glucose once daily Class: Normal  
 Pharmacy: [address]  [place], [address] [place] [address] Ph #: [phone]  
 lisinopril (PRINIVIL, ZESTRIL) 20 mg tablet 0 Sig: Take 1 Tab by mouth daily. Class: Normal  
 Pharmacy: [id number], [address] [place] [address] Ph #: [phone] Route: Oral  
  
Instrucciones de seguimiento Return in about 3 months (around 3/18/2018), or if symptoms worsen or fail to improve. Instrucciones para el Paciente DASH Diet: Care Instructions Your Care Instructions The DASH diet is an eating plan that can help lower your blood pressure. DASH stands for Dietary Approaches to Stop Hypertension. Hypertension is high blood pressure. The DASH diet focuses on eating foods that are high in calcium, potassium, and magnesium. These nutrients can lower blood pressure. The foods that are highest in these nutrients are fruits, vegetables, low-fat dairy products, nuts, seeds, and legumes. But taking calcium, potassium, and magnesium supplements instead of eating foods that are high in those nutrients does not have the same effect. The DASH diet also includes whole grains, fish, and poultry.  
The DASH diet is one of several lifestyle changes your doctor may recommend to lower your high blood pressure. Your doctor may also want you to decrease the amount of sodium in your diet. Lowering sodium while following the DASH diet can lower blood pressure even further than just the DASH diet alone. Follow-up care is a key part of your treatment and safety. Be sure to make and go to all appointments, and call your doctor if you are having problems. It's also a good idea to know your test results and keep a list of the medicines you take. How can you care for yourself at home? Following the DASH diet · Eat 4 to 5 servings of fruit each day. A serving is 1 medium-sized piece of fruit, ½ cup chopped or canned fruit, 1/4 cup dried fruit, or 4 ounces (½ cup) of fruit juice. Choose fruit more often than fruit juice. · Eat 4 to 5 servings of vegetables each day. A serving is 1 cup of lettuce or raw leafy vegetables, ½ cup of chopped or cooked vegetables, or 4 ounces (½ cup) of vegetable juice. Choose vegetables more often than vegetable juice. · Get 2 to 3 servings of low-fat and fat-free dairy each day. A serving is 8 ounces of milk, 1 cup of yogurt, or 1 ½ ounces of cheese. · Eat 6 to 8 servings of grains each day. A serving is 1 slice of bread, 1 ounce of dry cereal, or ½ cup of cooked rice, pasta, or cooked cereal. Try to choose whole-grain products as much as possible. · Limit lean meat, poultry, and fish to 2 servings each day. A serving is 3 ounces, about the size of a deck of cards. · Eat 4 to 5 servings of nuts, seeds, and legumes (cooked dried beans, lentils, and split peas) each week. A serving is 1/3 cup of nuts, 2 tablespoons of seeds, or ½ cup of cooked beans or peas. · Limit fats and oils to 2 to 3 servings each day. A serving is 1 teaspoon of vegetable oil or 2 tablespoons of salad dressing. · Limit sweets and added sugars to 5 servings or less a week. A serving is 1 tablespoon jelly or jam, ½ cup sorbet, or 1 cup of lemonade. · Eat less than 2,300 milligrams (mg) of sodium a day. If you limit your sodium to 1,500 mg a day, you can lower your blood pressure even more. Tips for success · Start small. Do not try to make dramatic changes to your diet all at once. You might feel that you are missing out on your favorite foods and then be more likely to not follow the plan. Make small changes, and stick with them. Once those changes become habit, add a few more changes. · Try some of the following: ¨ Make it a goal to eat a fruit or vegetable at every meal and at snacks. This will make it easy to get the recommended amount of fruits and vegetables each day. ¨ Try yogurt topped with fruit and nuts for a snack or healthy dessert. ¨ Add lettuce, tomato, cucumber, and onion to sandwiches. ¨ Combine a ready-made pizza crust with low-fat mozzarella cheese and lots of vegetable toppings. Try using tomatoes, squash, spinach, broccoli, carrots, cauliflower, and onions. ¨ Have a variety of cut-up vegetables with a low-fat dip as an appetizer instead of chips and dip. ¨ Sprinkle sunflower seeds or chopped almonds over salads. Or try adding chopped walnuts or almonds to cooked vegetables. ¨ Try some vegetarian meals using beans and peas. Add garbanzo or kidney beans to salads. Make burritos and tacos with mashed elizabeth beans or black beans. Where can you learn more? Go to http://michael-allyssa.info/. Enter M879 in the search box to learn more about \"DASH Diet: Care Instructions. \" Current as of: September 21, 2016 Content Version: 11.4 © 2714-7227 [employer]. Care instructions adapted under license by [employer] (which disclaims liability or warranty for this information). If you have questions about a medical condition or this instruction, always ask your healthcare professional. [address] any warranty or liability for your use of this information. Introducing [facility] SERVICES! Estimado Bertha : 
Juan por solicitar deander cuenta de MyChart margarita. Nuestros registros indican que usted ya tiene deandre cuenta [facility] [place]. Usted puede acceder a kate cuenta en cualquier momento en [email or web address] Sabía usted que usted puede acceder a kate hospital y las instrucciones de piat ER en cualquier momento en MyChart ? También puede revisar [facility] de las pruebas de kate hospitalización o visita a urgencias . Información Adicional 
 
Si tiene alguna pregunta , por favor visite la sección de preguntas frecuentes del sitio web MyChart en [email or web address] . Recuerde, MyChart NO es que se utilizará para las necesidades urgentes. Para emergencias médicas , llame al 911 . Ahora disponible en kate iPhone y Android ! Por favor proporcione rasheed resumen de la documentación de cuidado a kate próximo proveedor. Your primary care clinician is listed as Shirin Guzman. If you have any questions after today's visit, please call [phone].

## 2018-03-19 ENCOUNTER — OFFICE VISIT (OUTPATIENT)
Dept: FAMILY MEDICINE CLINIC | Age: 73
End: 2018-03-19

## 2018-03-19 VITALS
HEIGHT: 59 IN | TEMPERATURE: 97.9 F | BODY MASS INDEX: 38.75 KG/M2 | SYSTOLIC BLOOD PRESSURE: 162 MMHG | HEART RATE: 69 BPM | WEIGHT: 192.2 LBS | RESPIRATION RATE: 16 BRPM | DIASTOLIC BLOOD PRESSURE: 96 MMHG | OXYGEN SATURATION: 96 %

## 2018-03-19 DIAGNOSIS — K21.9 GASTROESOPHAGEAL REFLUX DISEASE, ESOPHAGITIS PRESENCE NOT SPECIFIED: ICD-10-CM

## 2018-03-19 DIAGNOSIS — M85.80 OSTEOPENIA, UNSPECIFIED LOCATION: ICD-10-CM

## 2018-03-19 DIAGNOSIS — E66.01 SEVERE OBESITY (BMI 35.0-39.9): ICD-10-CM

## 2018-03-19 DIAGNOSIS — Z00.00 MEDICARE ANNUAL WELLNESS VISIT, SUBSEQUENT: ICD-10-CM

## 2018-03-19 DIAGNOSIS — I10 ESSENTIAL HYPERTENSION: Primary | ICD-10-CM

## 2018-03-19 DIAGNOSIS — R73.01 IFG (IMPAIRED FASTING GLUCOSE): ICD-10-CM

## 2018-03-19 DIAGNOSIS — E78.00 PURE HYPERCHOLESTEROLEMIA: ICD-10-CM

## 2018-03-19 DIAGNOSIS — Z78.0 POST-MENOPAUSAL: ICD-10-CM

## 2018-03-19 DIAGNOSIS — Z71.89 ADVANCE CARE PLANNING: ICD-10-CM

## 2018-03-19 NOTE — MR AVS SNAPSHOT
[address] [place] [address] 
[phone] Patient: Tc Lantigua MRN: [id number] [id number] Visit Information Shailesh Soares Personal Médico Departamento Teléfono del Dep. Número de visita 3/19/2018  9:30 AM Melvi Ebonie, [address] [phone] Follow-up Instructions Return in about 3 months (around 6/19/2018), or if symptoms worsen or fail to improve. Upcoming Health Maintenance Date Due  
 GLAUCOMA SCREENING Q2Y 12/7/2017 MEDICARE YEARLY EXAM 3/28/2018 BREAST CANCER SCRN MAMMOGRAM 10/25/2019 COLONOSCOPY 1/1/2021 DTaP/Tdap/Td series (2 - Td) 9/26/2026 Alergias  Review Complete El: 3/19/2018 Por: Cathy Sanchez LPN A partir del:  3/19/2018 Intensidad Anotado Tipo de reacción [employer] Bystolic [Nebivolol]  89/75/7762    Other (comments) Leg cramps Norvasc [Amlodipine]  02/15/2012    Swelling Pravachol [Pravastatin]  05/03/2016   Side Effect Myalgia Vacunas actuales Revisadas el:  9/26/2016 Frida Fernandes Influenza High Dose Vaccine PF 12/1/2017, 9/26/2016 Influenza Vaccine 10/14/2014, 10/29/2013 Influenza Vaccine (Quad) PF 10/19/2015 Influenza Vaccine Split 10/2/2012 Pneumococcal Conjugate (PCV-13) 10/19/2015 ZZZ-RETIRED (DO NOT USE) Pneumococcal Vaccine (Unspecified Type) 1/1/2010 Zoster Vaccine, Live 1/1/2010 No revisadas esta visita You Were Diagnosed With   
  
 Alcides Downs Medicare annual wellness visit, subsequent    -  Primary ICD-10-CM: Z00.00 ICD-9-CM: V70.0 Partes vitales PS Pulso Temperatura Resp [place] ( percentil de crecimiento) Peso (percentil de crecimiento) (!) 162/96 (BP 1 Location: Left arm, BP Patient Position: Sitting) 69 97.9 °F (36.6 °C) (Oral) 16 4' 11\" (1.499 m) 192 lb 3.2 oz (87.2 kg) SpO2 BMI (IMC) Estado obstétrico Estatus de tabaquísmo 96% 38.82 kg/m2 Hysterectomy Never Smoker BMI and BSA Data Body Mass Index Body Surface Area  
 38.82 kg/m 2 1.91 m 2 [facility] Name Phone [address] [place], [address] [place] [address] [phone] Woody lista de medicamentos actualizada Lista actualizada 3/19/18 10:08 AM.  Soledad Moulton use woody lista de medicamentos más reciente. Blood-Glucose Meter monitoring kit También conocido will:  Jenny Bolder Use daily for glucose monitoring DX E11.9  
  
 cetirizine 10 mg tablet También conocido will:  ZyrTEC Take 1 Tab by mouth daily. FISH OIL 1,000 mg Cap Medicamento genérico:  omega-3 fatty acids-vitamin e Take 1 Cap by mouth daily. fluticasone 50 mcg/actuation nasal spray También conocido will:  FLONASE  
  
 * glucose blood VI test strips strip También conocido will:  ASCENSIA AUTODISC VI, ONE TOUCH ULTRA TEST VI One Touch Ultra Use daily for blood glucose monitoring. DX: E11.9  
  
 * glucose blood VI test strips strip También conocido will:  blood glucose test  
DX: E11.9 DM check fasting blood glucose once daily  
  
 hydroCHLOROthiazide 25 mg tablet También conocido will:  HYDRODIURIL  
TAKE 1 TABLET EVERY DAY  
  
 lisinopril 20 mg tablet También conocido will:  Arthur Belinda Take 1 Tab by mouth daily. multivitamin tablet También conocido will:  ONE A DAY Take 1 Tab by mouth daily. naproxen 500 mg tablet También conocido will:  NAPROSYN Take 1 Tab by mouth two (2) times daily (with meals). omeprazole 40 mg capsule También conocido will:  Warnell Jenaro One Touch Delica 33 gauge Misc Medicamento genérico:  lancets USE ONE LANCET TO CHECK BLOOD SUGARS ONCE DAILY  
  
 sucralfate 1 gram tablet También conocido will:  CARAFATE  
  
 traMADol 50 mg tablet También conocido will:  Yasmin Kaur  
 Take 1 Tab by mouth every eight (8) hours as needed for Pain. Max Daily Amount: 150 mg.  
  
 * Aviso:  Esta lista contiene medicamentos que son iguales a otros medicamentos recetados para usted. Mary las instrucciones con cuidado y pida a kate personal médico que revise la lista de medicamentos y las instrucciones correspondientes con usted. Instrucciones de seguimiento Return in about 3 months (around 6/19/2018), or if symptoms worsen or fail to improve. Instrucciones para el Paciente Medicare Wellness Visit, Female The best way to live healthy is to have a healthy lifestyle by eating a well-balanced diet, exercising regularly, limiting alcohol and stopping smoking. Regular physical exams and screening tests are another way to keep healthy. Preventive exams provided by your health care provider can find health problems before they become diseases or illnesses. Preventive services including immunizations, screening tests, monitoring and exams can help you take care of your own health. All people over age 72 should have a pneumovax  and and a prevnar shot to prevent pneumonia. These are once in a lifetime unless you and your provider decide differently. All people over 65 should have a yearly flu shot and a tetanus vaccine every 10 years. A bone mass density to screen for osteoporosis or thinning of the bones should be done every 2 years after 65. Screening for diabetes mellitus with a blood sugar test should be done every year. Glaucoma is a disease of the eye due to increased ocular pressure that can lead to blindness and it should be done every year by an eye professional. 
 
Cardiovascular screening tests that check for elevated lipids (fatty part of blood) which can lead to heart disease and strokes should be done every 5 years.  
 
Colorectal screening that evaluates for blood or polyps in your colon should be done yearly as a stool test or every five years as a flexible sigmoidoscope or every 10 years as a colonoscopy up to age 76. Breast cancer screening with a mammogram is recommended biennially  for women age 54-69. Screening for cervical cancer with a pap smear and pelvic exam is recommended for women after age 72 years every 2 years up to age 79 or when the provider and patient decide to stop. If there is a history of cervical abnormalities or other increased risk for cancer then the test is recommended yearly. Hepatitis C screening is also recommended for anyone born between 80 through [address]. A shingles vaccine is also recommended once in a lifetime after age 61. Your Medicare Wellness Exam is recommended annually. Here is a list of your current Health Maintenance items with a due date: 
Health Maintenance Due Topic Date Due  Glaucoma Screening   12/07/2017 Introducing [facility]! Estimado Bertha : 
Juan por solicitar deandre cuenta de [place] ansari. Nuestros registros indican que usted ya tiene deander cuenta [facility]. Usted puede acceder a kate cuenta en cualquier momento en [email or web address] Sabía usted que usted puede acceder a kate hospital y las instrucciones de pita ER en cualquier momento en MyChart ? También puede revisar [facility] de las pruebas de kate hospitalización o visita a urgencias . Información Adicional 
 
Si tiene alguna pregunta , por favor visite la sección de preguntas frecuentes del sitio web MyChart en [email or web address] . Recuerde, MyChart NO es que se utilizará para las necesidades urgentes. Para emergencias médicas , llame al 911 . Ahora disponible en kate iPhone y Android ! Por favor proporcione rasheed resumen de la documentación de cuidado a kate próximo proveedor.  
 
  
  
 Your primary care clinician is listed as Aracely Lares. If you have any questions after today's visit, please call [phone].

## 2018-03-19 NOTE — PATIENT INSTRUCTIONS

## 2018-03-19 NOTE — ACP (ADVANCE CARE PLANNING)
Advance Care Planning (ACP) Provider Conversation Snapshot    Date of ACP Conversation: 03/19/18  Persons included in Conversation:  patient and family  Length of ACP Conversation in minutes:  <16 minutes (Non-Billable)    Authorized Decision Maker (if patient is incapable of making informed decisions):    This person is:   Has yet to decide        For Patients with Decision Making Capacity:   Values/Goals: Exploration of values, goals, and preferences if recovery is not expected, even with continued medical treatment in the event of:  Imminent death  Severe, permanent brain injury    Conversation Outcomes / Follow-Up Plan:   Recommended completion of Advance Directive form after review of ACP materials and conversation with prospective healthcare agent   Recommended communicating the plan and making copies for the healthcare agent, personal physician, and others as appropriate (e.g., health system)  Recommended review of completed ACP document annually or upon change in health status

## 2018-03-19 NOTE — PROGRESS NOTES
.    This is the Subsequent Medicare Annual Wellness Exam, performed 12 months or more after the Initial AWV or the last Subsequent AWV    I have reviewed the patient's medical history in detail and updated the computerized patient record. History     Past Medical History:   Diagnosis Date    Alpha thalassemia ([facility] 2/15/2012    Anxiety     BMI 40.0-44.9, adult ([facility] 5/10/2017    Breast tumor     benign cyst    Carotid stenosis, left     LICA <01%    Esophageal stricture     dr. Geoffrey Corrales, s/p balloon dilation 9/2016    Gastric polyps     GERD (gastroesophageal reflux disease)     endoscopy 7/08- hiatal hernia/gastric polyps, esophagitis 9/2016 on prilosec/carafate    Headache(784.0)     Hypertension     nuclear stress test neg 1/2009    Hypertensive retinopathy     IFG (impaired fasting glucose)     Osteopenia       Past Surgical History:   Procedure Laterality Date    HX BREAST BIOPSY  1978    bilateral breasts    HX HYSTERECTOMY       Current Outpatient Prescriptions   Medication Sig Dispense Refill    lisinopril (PRINIVIL, ZESTRIL) 20 mg tablet Take 1 Tab by mouth daily. 90 Tab 0    hydroCHLOROthiazide (HYDRODIURIL) 25 mg tablet TAKE 1 TABLET EVERY DAY 90 Tab 1    traMADol (ULTRAM) 50 mg tablet Take 1 Tab by mouth every eight (8) hours as needed for Pain. Max Daily Amount: 150 mg. 6 Tab 0    omeprazole (PRILOSEC) 40 mg capsule       ONE TOUCH DELICA 33 gauge misc USE ONE LANCET TO CHECK BLOOD SUGARS ONCE DAILY 100 Lancet 3    sucralfate (CARAFATE) 1 gram tablet       naproxen (NAPROSYN) 500 mg tablet Take 1 Tab by mouth two (2) times daily (with meals). 30 Tab 0    cetirizine (ZYRTEC) 10 mg tablet Take 1 Tab by mouth daily. 30 Tab 0    glucose blood VI test strips (ASCENSIA AUTODISC VI, ONE TOUCH ULTRA TEST VI) strip One Touch Ultra Use daily for blood glucose monitoring.  DX: E11.9 3 Package 3    Blood-Glucose Meter (ONETOUCH ULTRAMINI) monitoring kit Use daily for glucose monitoring DX E11.9 1 Kit 0    fluticasone (FLONASE) 50 mcg/actuation nasal spray       omega-3 fatty acids-vitamin e (FISH OIL) 1,000 mg Cap Take 1 Cap by mouth daily.  multivitamin (ONE A DAY) tablet Take 1 Tab by mouth daily.  glucose blood VI test strips (BLOOD GLUCOSE TEST) strip DX: E11.9 DM check fasting blood glucose once daily 200 Strip 2     Allergies   Allergen Reactions    Bystolic [Nebivolol] Other (comments)     Leg cramps    Norvasc [Amlodipine] Swelling    Pravachol [Pravastatin] Myalgia     Family History   Problem Relation Age of Onset    Cancer Mother      Social History   Substance Use Topics    Smoking status: Never Smoker    Smokeless tobacco: Never Used    Alcohol use No      Comment: \"Often\"     Patient Active Problem List   Diagnosis Code    IFG (impaired fasting glucose) R73.01    Hyperlipidemia E78.5    GERD (gastroesophageal reflux disease) K21.9    Alpha thalassemia ([facility]) D56.0    Obesity E66.9    Carotid stenosis, left I65.22    Osteopenia M85.80    Anxiety F41.9    Hypertensive retinopathy H35.039    Essential hypertension I10    Advance directive discussed with patient-POA Casper Apley Z71.89    Chronic scapular pain-intermittent M89.8X1, G89.29    Severe obesity (BMI 35.0-39.9) ([facility]) E66.01       Depression Risk Factor Screening:     PHQ over the last two weeks 12/1/2017   Little interest or pleasure in doing things Not at all   Feeling down, depressed or hopeless Not at all   Total Score PHQ 2 0     Alcohol Risk Factor Screening: You do not drink alcohol or very rarely. Functional Ability and Level of Safety:   Hearing Loss  Hearing is good. Activities of Daily Living  The home contains: no safety equipment. Patient does total self care    Fall Risk  Fall Risk Assessment, last 12 mths 12/1/2017   Able to walk? Yes   Fall in past 12 months? No   Fall with injury? -   Number of falls in past 12 months -       Abuse Screen  Patient is not abused.  Patient here with , Otilio Neither. Cognitive Screening   Evaluation of Cognitive Function:  Has your family/caregiver stated any concerns about your memory: no  Normal    Patient Care Team   Patient Care Team:  Jordana Martínez MD as PCP - General (Family Practice)  Prachi Roth MD (Surgery)  [facility], MD (Physical Medicine and Rehab)  Geeta Curtis MD as Surgeon (Surgical Oncology)  João Duran MD (Orthopedic Surgery)  Gen Chisholm DO (Optometry)  Good Zeng MD (Gastroenterology)    Assessment/Plan   Education and counseling provided:  Are appropriate based on today's review and evaluation  End-of-Life planning (with patient's consent)- discussed, pt says they are still working on this  Pneumococcal Vaccine- compelted  Influenza Vaccine- utd  Screening Mammography- due 10/18, will order on next visit  Colorectal cancer screening tests- per pt UTD, request record Dr. Bairon Padron  Cardiovascular screening blood test- UTD 12/18  Bone mass measurement (DEXA)- due, reminded/reordered    Diagnoses and all orders for this visit:    1. Medicare annual wellness visit, subsequent      Health Maintenance Due   Topic Date Due    GLAUCOMA SCREENING Q2Y  12/07/2017           Rashid Osborne, 74 y.o.,  female    SUBJECTIVE  Routine ff-up    HTN-  Added lisinopril to  HCTZ on last visit. She reports being on multiple hypertensives in the past, and cut out BP meds due to hypotension/ and  white coat hypertension. There is NO note of hypertensive retinopathy on review of dr. Domingo Mckeon note, unclear why this was in her records. HL- says intolerant to lipitor/pravachol pt reports muscle aches. Says follows healthy diet. She is sedentary. Prediabetes- checks her sugars 90- 100's. Says tries to follow diet. GERD- per pt doing well, no heartburn on prilosec. Previous EGD dilation and dysphagia has improved.      ROS:  See HPI, all others negative        Patient Active Problem List   Diagnosis Code    IFG (impaired fasting glucose) R73.01    Hyperlipidemia E78.5    GERD (gastroesophageal reflux disease) K21.9    Alpha thalassemia ([facility]) D56.0    Obesity E66.9    Carotid stenosis, left I65.22    Osteopenia M85.80    Anxiety F41.9    Hypertensive retinopathy H35.039    Essential hypertension I10    Advance directive discussed with patient-DARIUSZ Urias Z71.89    Chronic scapular pain-intermittent M89.8X1, G89.29    Severe obesity (BMI 35.0-39.9) ([facility]) E66.01       Current Outpatient Prescriptions   Medication Sig Dispense Refill    lisinopril (PRINIVIL, ZESTRIL) 20 mg tablet Take 1 Tab by mouth daily. 90 Tab 0    hydroCHLOROthiazide (HYDRODIURIL) 25 mg tablet TAKE 1 TABLET EVERY DAY 90 Tab 1    traMADol (ULTRAM) 50 mg tablet Take 1 Tab by mouth every eight (8) hours as needed for Pain. Max Daily Amount: 150 mg. 6 Tab 0    omeprazole (PRILOSEC) 40 mg capsule       ONE TOUCH DELICA 33 gauge misc USE ONE LANCET TO CHECK BLOOD SUGARS ONCE DAILY 100 Lancet 3    sucralfate (CARAFATE) 1 gram tablet       naproxen (NAPROSYN) 500 mg tablet Take 1 Tab by mouth two (2) times daily (with meals). 30 Tab 0    cetirizine (ZYRTEC) 10 mg tablet Take 1 Tab by mouth daily. 30 Tab 0    glucose blood VI test strips (ASCENSIA AUTODISC VI, ONE TOUCH ULTRA TEST VI) strip One Touch Ultra Use daily for blood glucose monitoring. DX: E11.9 3 Package 3    Blood-Glucose Meter (ONETOUCH ULTRAMINI) monitoring kit Use daily for glucose monitoring DX E11.9 1 Kit 0    fluticasone (FLONASE) 50 mcg/actuation nasal spray       omega-3 fatty acids-vitamin e (FISH OIL) 1,000 mg Cap Take 1 Cap by mouth daily.  multivitamin (ONE A DAY) tablet Take 1 Tab by mouth daily.         glucose blood VI test strips (BLOOD GLUCOSE TEST) strip DX: E11.9 DM check fasting blood glucose once daily 200 Strip 2       Allergies   Allergen Reactions    Bystolic [Nebivolol] Other (comments)     Leg cramps    Norvasc [Amlodipine] Swelling    Pravachol [Pravastatin] Myalgia       Past Medical History:   Diagnosis Date    Alpha thalassemia ([facility] 2/15/2012    Anxiety     BMI 40.0-44.9, adult ([facility] 5/10/2017    Breast tumor     benign cyst    Carotid stenosis, left     LICA <70%    Esophageal stricture     dr. Barbara Dowling, s/p balloon dilation 9/2016    Gastric polyps     GERD (gastroesophageal reflux disease)     endoscopy 7/08- hiatal hernia/gastric polyps, esophagitis 9/2016 on prilosec/carafate    Headache(784.0)     Hypertension     nuclear stress test neg 1/2009    IFG (impaired fasting glucose)     Osteopenia        Social History     Social History    Marital status:      Spouse name: N/A    Number of children: N/A    Years of education: N/A     Occupational History    Not on file.      Social History Main Topics    Smoking status: Never Smoker    Smokeless tobacco: Never Used    Alcohol use No      Comment: \"Often\"    Drug use: No    Sexual activity: Yes     Partners: Male     Other Topics Concern    Not on file     Social History Narrative       Family History   Problem Relation Age of Onset    Cancer Mother          OBJECTIVE    Physical Exam:     Visit Vitals    BP (!) 162/96 (BP 1 Location: Left arm, BP Patient Position: Sitting)    Pulse 69    Temp 97.9 °F (36.6 °C) (Oral)    Resp 16    Ht 4' 11\" (1.499 m)    Wt 192 lb 3.2 oz (87.2 kg)    SpO2 96%    BMI 38.82 kg/m2       General: alert, n no apparent distress or pain  CVS: normal rate, regular rhythm, distinct S1 and S2  Lungs:clear to ausculation bilaterally, no crackles, wheezing or rhonchi noted  Skin: warm, no lesions, rashes noted  Psych:  mood and affect normal    Results for orders placed or performed during the hospital encounter of 12/11/17   HEMOGLOBIN A1C W/O EAG   Result Value Ref Range    Hemoglobin A1c 5.8 (H) 4.2 - 5.6 %   LIPID PANEL   Result Value Ref Range    LIPID PROFILE          Cholesterol, total 219 (H) <200 MG/DL Triglyceride 142 <150 MG/DL    HDL Cholesterol 53 40 - 60 MG/DL    LDL, calculated 137.6 (H) 0 - 100 MG/DL    VLDL, calculated 28.4 MG/DL    CHOL/HDL Ratio 4.1 0 - 5.0     METABOLIC PANEL, COMPREHENSIVE   Result Value Ref Range    Sodium 140 136 - 145 mmol/L    Potassium 4.4 3.5 - 5.5 mmol/L    Chloride 102 100 - 108 mmol/L    CO2 32 21 - 32 mmol/L    Anion gap 6 3.0 - 18 mmol/L    Glucose 98 74 - 99 mg/dL    BUN 15 7.0 - 18 MG/DL    Creatinine 0.87 0.6 - 1.3 MG/DL    BUN/Creatinine ratio 17 12 - 20      GFR est AA >60 >60 ml/min/1.73m2    GFR est non-AA >60 >60 ml/min/1.73m2    Calcium 8.8 8.5 - 10.1 MG/DL    Bilirubin, total 0.5 0.2 - 1.0 MG/DL    ALT (SGPT) 25 13 - 56 U/L    AST (SGOT) 14 (L) 15 - 37 U/L    Alk. phosphatase 87 45 - 117 U/L    Protein, total 8.0 6.4 - 8.2 g/dL    Albumin 3.6 3.4 - 5.0 g/dL    Globulin 4.4 (H) 2.0 - 4.0 g/dL    A-G Ratio 0.8 0.8 - 1.7           ASSESSMENT/PLAN  Bertha was seen today for hypertension, cholesterol problem, other and annual wellness visit. Diagnoses and associated orders for this visit:  Essential hypertension-  elevated today, improves  with recheck. However still reading higher than personal BP, advised to change BP monitor on last visit   did not bring log or monitor today  pt does white coat phenomenon   cont HCTZ and lisinopril   advise ff-up  In 3 months     Prediabetes-   a1c 5.8>6.2>5.8>5.9>5.8   cont TLCs, monitoring    Hyperlipidemia-   intolerant to lipitor/pravachol , calculated 10 yr cv risk 16%,   She does not want to try another statin, advised low fat     Osteopenia-   cont vit D/ Ca RDA  Update dexa soon, reordered, advised to schedule themselves    GERD-   stable on PPI    BMI 38  Discussed weight loss strategies, limit carbs rice/avoid apple juice    Post menopausal  DEXA scan    Follow-up Disposition:  Return in about 3 months (around 6/19/2018), or if symptoms worsen or fail to improve. Patient understands plan of care.  Patient has provided input and agrees with goals.

## 2018-04-04 ENCOUNTER — HOSPITAL ENCOUNTER (OUTPATIENT)
Dept: BONE DENSITY | Age: 73
Discharge: HOME | End: 2018-04-04
Attending: FAMILY MEDICINE
Payer: MEDICARE

## 2018-04-04 DIAGNOSIS — M85.80 OSTEOPENIA, UNSPECIFIED LOCATION: ICD-10-CM

## 2018-04-04 DIAGNOSIS — Z78.0 POST-MENOPAUSAL: ICD-10-CM

## 2018-04-04 PROCEDURE — 77080 DXA BONE DENSITY AXIAL: CPT

## 2018-04-05 NOTE — PROGRESS NOTES
Bone density is c/w osteopenia (borderline osteoporosis), cont  RDA calcium 1200 mg, Vit D (800 iu od), regular exercise.

## 2018-04-13 NOTE — PROGRESS NOTES
Patient identified with 2 identifiers (name and ). Patient aware of Bone density is c/w osteopenia (borderline osteoporosis), cont  RDA calcium 1200 mg, Vit D (800 iu od), regular exercise.

## 2018-06-19 ENCOUNTER — OFFICE VISIT (OUTPATIENT)
Dept: FAMILY MEDICINE CLINIC | Age: 73
End: 2018-06-19

## 2018-06-19 VITALS
RESPIRATION RATE: 16 BRPM | SYSTOLIC BLOOD PRESSURE: 130 MMHG | TEMPERATURE: 98.1 F | BODY MASS INDEX: 37.9 KG/M2 | WEIGHT: 188 LBS | HEIGHT: 59 IN | DIASTOLIC BLOOD PRESSURE: 67 MMHG | HEART RATE: 78 BPM

## 2018-06-19 DIAGNOSIS — R73.01 IFG (IMPAIRED FASTING GLUCOSE): ICD-10-CM

## 2018-06-19 DIAGNOSIS — D12.6 ADENOMATOUS POLYP OF COLON, UNSPECIFIED PART OF COLON: ICD-10-CM

## 2018-06-19 DIAGNOSIS — I10 ESSENTIAL HYPERTENSION: ICD-10-CM

## 2018-06-19 DIAGNOSIS — E78.00 PURE HYPERCHOLESTEROLEMIA: ICD-10-CM

## 2018-06-19 DIAGNOSIS — I10 WHITE COAT SYNDROME WITH HYPERTENSION: Primary | ICD-10-CM

## 2018-06-19 DIAGNOSIS — F41.9 ANXIETY: ICD-10-CM

## 2018-06-19 DIAGNOSIS — K21.9 GASTROESOPHAGEAL REFLUX DISEASE, ESOPHAGITIS PRESENCE NOT SPECIFIED: ICD-10-CM

## 2018-06-19 RX ORDER — PANTOPRAZOLE SODIUM 40 MG/1
40 TABLET, DELAYED RELEASE ORAL DAILY
Qty: 90 TAB | Refills: 0 | Status: SHIPPED | OUTPATIENT
Start: 2018-06-19 | End: 2018-09-20

## 2018-06-19 NOTE — MR AVS SNAPSHOT
[address] [place] [address] 950 [facility] 
[phone] Patient: Bossman Cowart MRN: [id number] KTP:9/16/5189 Visit Information [address] [place] y [place]q Personal Médico Departamento Teléfono del Dep. Número de visita 6/19/2018 10:00 AM Franky Choudhary, [address] [id number] Follow-up Instructions Return in about 3 months (around 9/19/2018), or if symptoms worsen or fail to improve. Upcoming Health Maintenance Date Due Influenza Age 5 to Adult 8/1/2018 MEDICARE YEARLY EXAM 3/20/2019 GLAUCOMA SCREENING Q2Y 5/16/2019 BREAST CANCER SCRN MAMMOGRAM 10/25/2019 COLONOSCOPY 1/1/2021 DTaP/Tdap/Td series (2 - Td) 9/26/2026 Alergias  Review Complete El: 6/19/2018 Por: MD Chip Kendrick del:  6/19/2018 Intensidad Anotado Tipo de reacción [employer] Bystolic [Nebivolol]  29/48/2930    Other (comments) Leg cramps Norvasc [Amlodipine]  02/15/2012    Swelling Pravachol [Pravastatin]  05/03/2016   Side Effect Myalgia Vacunas actuales Revisadas el:  9/26/2016 Yobany Silva Influenza High Dose Vaccine PF 12/1/2017, 9/26/2016 Influenza Vaccine 10/14/2014, 10/29/2013 Influenza Vaccine (Quad) PF 10/19/2015 Influenza Vaccine Split 10/2/2012 Pneumococcal Conjugate (PCV-13) 10/19/2015 ZZZ-RETIRED (DO NOT USE) Pneumococcal Vaccine (Unspecified Type) 1/1/2010 Zoster Vaccine, Live 1/1/2010 No revisadas esta visita You Were Diagnosed With   
  
 Frederick Bering White coat syndrome with hypertension    -  Primary ICD-10-CM: I10 
ICD-9-CM: 401.9 Anxiety     ICD-10-CM: F41.9 ICD-9-CM: 300.00 Essential hypertension     ICD-10-CM: I10 
ICD-9-CM: 401.9 IFG (impaired fasting glucose)     ICD-10-CM: R73.01 
ICD-9-CM: 790.21 Pure hypercholesterolemia     ICD-10-CM: E78.00 ICD-9-CM: 272.0 Gastroesophageal reflux disease, esophagitis presence not specified     ICD-10-CM: K21.9 ICD-9-CM: 530.81 Partes vitales PS Pulso Temperatura Resp [place] ( percentil de crecimiento) Peso (percentil de crecimiento) (!) 168/108 (BP 1 Location: Left arm, BP Patient Position: Sitting) 78 98.1 °F (36.7 °C) (Oral) 16 4' 11\" (1.499 m) 188 lb (85.3 kg) BMI formerly [facility]) Estado obstétrico Estatus de tabaquísmo 37.97 kg/m2 Hysterectomy Never Smoker BMI and BSA Data Body Mass Index Body Surface Area  
 37.97 kg/m 2 1.88 m 2 [facility] Name Phone [address] [facility], [address] [place] [address] [phone] Kate lista de medicamentos actualizada Kim Amel actualizada 6/19/18 10:44 AM.  Patsi Fillers use kate lista de medicamentos más reciente. Blood-Glucose Meter monitoring kit También conocido will:  [facility] Use daily for glucose monitoring DX E11.9  
  
 cetirizine 10 mg tablet También conocido will:  ZyrTEC Take 1 Tab by mouth daily. FISH OIL 1,000 mg Cap Medicamento genérico:  omega-3 fatty acids-vitamin e Take 1 Cap by mouth daily. fluticasone 50 mcg/actuation nasal spray También conocido will:  FLONASE  
  
 * glucose blood VI test strips strip También conocido will:  ASCENSIA AUTODISC VI, ONE TOUCH ULTRA TEST VI One Touch Ultra Use daily for blood glucose monitoring. DX: E11.9  
  
 * glucose blood VI test strips strip También conocido will:  blood glucose test  
DX: E11.9 DM check fasting blood glucose once daily  
  
 hydroCHLOROthiazide 25 mg tablet También conocido will:  HYDRODIURIL  
TAKE 1 TABLET EVERY DAY  
  
 multivitamin tablet También conocido will:  ONE A DAY Take 1 Tab by mouth daily. naproxen 500 mg tablet También conocido will:  NAPROSYN Take 1 Tab by mouth two (2) times daily (with meals). One Touch Delica 33 gauge Misc Medicamento genérico:  lancets USE ONE LANCET TO CHECK BLOOD SUGARS ONCE DAILY pantoprazole 40 mg tablet También conocido will:  PROTONIX Take 1 Tab by mouth daily. sucralfate 1 gram tablet También conocido will:  CARAFATE  
  
 traMADol 50 mg tablet También conocido will:  ULTRAM  
Take 1 Tab by mouth every eight (8) hours as needed for Pain. Max Daily Amount: 150 mg.  
  
 * Aviso:  Esta lista contiene medicamentos que son iguales a otros medicamentos recetados para usted. Mary las instrucciones con cuidado y pida a kate personal médico que revise la lista de medicamentos y las instrucciones correspondientes con usted. Recetas Enviado a la [place] Refills  
 pantoprazole (PROTONIX) 40 mg tablet 0 Sig: Take 1 Tab by mouth daily. Class: Normal  
 Pharmacy: [facility] DR DANILO REID [address] #: [phone] Route: Oral  
  
Hicimos lo siguiente REFERRAL TO GASTROENTEROLOGY [ABC73 Custom] Comentarios: Or first available Instrucciones de seguimiento Return in about 3 months (around 9/19/2018), or if symptoms worsen or fail to improve. Por hacer 06/19/2018 Lab:  HEMOGLOBIN A1C W/O EAG   
  
 06/19/2018 Lab:  METABOLIC PANEL, COMPREHENSIVE Informacion de [employer] Referencia Referido por Referido a  
  
 [id number] [address] [place] [address] [address] [place], [address] Phone: [phone] Fax: [phone] Visitas Estado Michelle Caldera de inicio Michelle Caldera final  
 1 New Request 6/19/18 6/19/19 Si kate referencia tiene un estado de \"pending review\" o \"denied\" , informacion adicional sera enviada para apoyar el resultado de esta decision. Instrucciones para el Paciente Gastroesophageal Reflux Disease (GERD): Care Instructions Your Care Instructions Gastroesophageal reflux disease (GERD) is the backward flow of stomach acid into the esophagus. The esophagus is the tube that leads from your throat to your stomach. A one-way valve prevents the stomach acid from moving up into this tube. When you have GERD, this valve does not close tightly enough. If you have mild GERD symptoms including heartburn, you may be able to control the problem with antacids or over-the-counter medicine. Changing your diet, losing weight, and making other lifestyle changes can also help reduce symptoms. Follow-up care is a key part of your treatment and safety. Be sure to make and go to all appointments, and call your doctor if you are having problems. It's also a good idea to know your test results and keep a list of the medicines you take. How can you care for yourself at home? · Take your medicines exactly as prescribed. Call your doctor if you think you are having a problem with your medicine. · Your doctor may recommend over-the-counter medicine. For mild or occasional indigestion, antacids, such as Tums, Gaviscon, Mylanta, or Maalox, may help. Your doctor also may recommend over-the-counter acid reducers, such as Pepcid AC, Tagamet HB, Zantac 75, or Prilosec. Read and follow all instructions on the label. If you use these medicines often, talk with your doctor. · Change your eating habits. ¨ It's best to eat several small meals instead of two or three large meals. ¨ After you eat, wait 2 to 3 hours before you lie down. ¨ Chocolate, mint, and alcohol can make GERD worse. ¨ Spicy foods, foods that have a lot of acid (like tomatoes and oranges), and coffee can make GERD symptoms worse in some people. If your symptoms are worse after you eat a certain food, you may want to stop eating that food to see if your symptoms get better. · Do not smoke or chew tobacco. Smoking can make GERD worse. If you need help quitting, talk to your doctor about stop-smoking programs and medicines. These can increase your chances of quitting for good. · If you have GERD symptoms at night, raise the head of your bed 6 to 8 inches by putting the frame on blocks or placing a foam wedge under the head of your mattress. (Adding extra pillows does not work.) · Do not wear tight clothing around your middle. · Lose weight if you need to. Losing just 5 to 10 pounds can help. When should you call for help? Call your doctor now or seek immediate medical care if: 
? · You have new or different belly pain. ? · Your stools are black and tarlike or have streaks of blood. ? Watch closely for changes in your health, and be sure to contact your doctor if: 
? · Your symptoms have not improved after 2 days. ? · Food seems to catch in your throat or chest.  
Where can you learn more? Go to http://michael-allyssa.info/. Enter S535 in the search box to learn more about \"Gastroesophageal Reflux Disease (GERD): Care Instructions. \" Current as of: May 12, 2017 Content Version: 11.4 © 4453-0523 [employer]. Care instructions adapted under license by [employer] (which disclaims liability or warranty for this information). If you have questions about a medical condition or this instruction, always ask your healthcare professional. [place] [address] any warranty or liability for your use of this information. Introducing [facility]! Estimado Bertha : 
Juan por solicitar deandre cuenta de MyChart elan. Nuestros registros indican que usted ya tiene deandre cuenta MyChart [place]. Usted puede acceder a kate cuenta en cualquier momento en [email or web address] [employer]. [email or web address] Sabía usted que usted puede acceder a kate hospital y las instrucciones de pita ER en cualquier momento en MyChart ? También puede revisar [employer] de las pruebas de kate hospitalización o visita a urgencias . Información Adicional 
 
Si tiene alguna pregunta , por favor visite la sección de preguntas frecuentes del sitio web MyChart en [email or web address] . Recuerde, MyChart NO es que se utilizará para las necesidades urgentes. Para emergencias médicas , llame al 911 . Ahora disponible en kate iPhone y Android ! Por favor proporcione rasheed resumen de la documentación de cuidado a kate próximo proveedor. Your primary care clinician is listed as Antonieta Saul. If you have any questions after today's visit, please call [phone].

## 2018-06-19 NOTE — PROGRESS NOTES
Martin Coelho, 74 y.o.,  female    SUBJECTIVE  Routine ff-up    HTN-  Added lisinopril to  HCTZ on last visit. She reports only taking lisinopril with times she gets anxious with SBP readings 150's. Which is about twice a week. She reports being on multiple hypertensives in the past, and cut out BP meds due to hypotension/ and  white coat hypertension. There is NO note of hypertensive retinopathy on review of dr. Guzman Rather note, unclear why this was in her records. Reviewed BP log 110-130's/60-70's. Compared home BP monitor and consistent. HL- says intolerant to lipitor/pravachol pt reports muscle aches. Says follows healthy diet. She is sedentary. Prediabetes- checks her sugars 90- 100's. Says tries to follow diet. GERD- says heartburn is worse past few months. No changes to diet, has been following dr. Reid Early  Previously with periodic EGD dilation. They are requesting to be seen closer GI location, will try GLST group. ROS:  See HPI, all others negative        Patient Active Problem List   Diagnosis Code    IFG (impaired fasting glucose) R73.01    Hyperlipidemia E78.5    GERD (gastroesophageal reflux disease) K21.9    Alpha thalassemia (HCC) D56.0    Obesity E66.9    Carotid stenosis, left I65.22    Osteopenia M85.80    Anxiety F41.9    Hypertensive retinopathy H35.039    Essential hypertension I10    Advance directive discussed with patient-DARIUSZ Morejon Z71.89    Chronic scapular pain-intermittent M89.8X1, G89.29    Severe obesity (BMI 35.0-39.9) ([facility]) E66.01       Current Outpatient Prescriptions   Medication Sig Dispense Refill    lisinopril (PRINIVIL, ZESTRIL) 20 mg tablet Take 1 Tab by mouth daily. 90 Tab 0    hydroCHLOROthiazide (HYDRODIURIL) 25 mg tablet TAKE 1 TABLET EVERY DAY 90 Tab 1    traMADol (ULTRAM) 50 mg tablet Take 1 Tab by mouth every eight (8) hours as needed for Pain.  Max Daily Amount: 150 mg. 6 Tab 0    omeprazole (PRILOSEC) 40 mg capsule  ONE TOUCH DELICA 33 gauge misc USE ONE LANCET TO CHECK BLOOD SUGARS ONCE DAILY 100 Lancet 3    sucralfate (CARAFATE) 1 gram tablet       naproxen (NAPROSYN) 500 mg tablet Take 1 Tab by mouth two (2) times daily (with meals). 30 Tab 0    cetirizine (ZYRTEC) 10 mg tablet Take 1 Tab by mouth daily. 30 Tab 0    glucose blood VI test strips (ASCENSIA AUTODISC VI, ONE TOUCH ULTRA TEST VI) strip One Touch Ultra Use daily for blood glucose monitoring. DX: E11.9 3 Package 3    Blood-Glucose Meter (ONETOUCH ULTRAMINI) monitoring kit Use daily for glucose monitoring DX E11.9 1 Kit 0    fluticasone (FLONASE) 50 mcg/actuation nasal spray       omega-3 fatty acids-vitamin e (FISH OIL) 1,000 mg Cap Take 1 Cap by mouth daily.  multivitamin (ONE A DAY) tablet Take 1 Tab by mouth daily.  glucose blood VI test strips (BLOOD GLUCOSE TEST) strip DX: E11.9 DM check fasting blood glucose once daily 200 Strip 2       Allergies   Allergen Reactions    Bystolic [Nebivolol] Other (comments)     Leg cramps    Norvasc [Amlodipine] Swelling    Pravachol [Pravastatin] Myalgia       Past Medical History:   Diagnosis Date    [facility]2012    Anxiety     BMI 40.0-44.9, adult ([facility] 5/10/2017    Breast tumor     benign cyst    Carotid stenosis, left     LICA <07%    Esophageal stricture     dr. Joanne Nguyen, s/p balloon dilation 9/2016    Gastric polyps     GERD (gastroesophageal reflux disease)     endoscopy 7/08- hiatal hernia/gastric polyps, esophagitis 9/2016 on prilosec/carafate    Headache(784.0)     Hypertension     nuclear stress test neg 1/2009    IFG (impaired fasting glucose)     Osteopenia        Social History     Social History    Marital status:      Spouse name: N/A    Number of children: N/A    Years of education: N/A     Occupational History    Not on file.      Social History Main Topics    Smoking status: Never Smoker    Smokeless tobacco: Never Used    Alcohol use No Comment: \"Often\"    Drug use: No    Sexual activity: Yes     Partners: Male     Other Topics Concern    Not on file     Social History Narrative       Family History   Problem Relation Age of Onset    Cancer Mother          OBJECTIVE    Physical Exam:     Visit Vitals    BP (!) 162/96 (BP 1 Location: Left arm, BP Patient Position: Sitting)    Pulse 69    Temp 97.9 °F (36.6 °C) (Oral)    Resp 16    Ht 4' 11\" (1.499 m)    Wt 192 lb 3.2 oz (87.2 kg)    SpO2 96%    BMI 38.82 kg/m2       General: alert, n no apparent distress or pain  CVS: normal rate, regular rhythm, distinct S1 and S2  Lungs:clear to ausculation bilaterally, no crackles, wheezing or rhonchi noted  Skin: warm, no lesions, rashes noted  Psych:  mood and affect normal    Results for orders placed or performed during the hospital encounter of 12/11/17   HEMOGLOBIN A1C W/O EAG   Result Value Ref Range    Hemoglobin A1c 5.8 (H) 4.2 - 5.6 %   LIPID PANEL   Result Value Ref Range    LIPID PROFILE          Cholesterol, total 219 (H) <200 MG/DL    Triglyceride 142 <150 MG/DL    HDL Cholesterol 53 40 - 60 MG/DL    LDL, calculated 137.6 (H) 0 - 100 MG/DL    VLDL, calculated 28.4 MG/DL    CHOL/HDL Ratio 4.1 0 - 5.0     METABOLIC PANEL, COMPREHENSIVE   Result Value Ref Range    Sodium 140 136 - 145 mmol/L    Potassium 4.4 3.5 - 5.5 mmol/L    Chloride 102 100 - 108 mmol/L    CO2 32 21 - 32 mmol/L    Anion gap 6 3.0 - 18 mmol/L    Glucose 98 74 - 99 mg/dL    BUN 15 7.0 - 18 MG/DL    Creatinine 0.87 0.6 - 1.3 MG/DL    BUN/Creatinine ratio 17 12 - 20      GFR est AA >60 >60 ml/min/1.73m2    GFR est non-AA >60 >60 ml/min/1.73m2    Calcium 8.8 8.5 - 10.1 MG/DL    Bilirubin, total 0.5 0.2 - 1.0 MG/DL    ALT (SGPT) 25 13 - 56 U/L    AST (SGOT) 14 (L) 15 - 37 U/L    Alk.  phosphatase 87 45 - 117 U/L    Protein, total 8.0 6.4 - 8.2 g/dL    Albumin 3.6 3.4 - 5.0 g/dL    Globulin 4.4 (H) 2.0 - 4.0 g/dL    A-G Ratio 0.8 0.8 - 1.7           ASSESSMENT/PLAN  [facility] was seen today for hypertension, cholesterol problem, other and annual wellness visit. Diagnoses and associated orders for this visit:  White coat with Essential hypertension-  Home BP monitor is consistent   cont HCTZ and stop lisinopril   advise ff-up  In 3 months     Prediabetes-   a1c 5.8>6.2>5.8>5.9>5.8   cont TLCs, monitoring  A1c/cmp in 3 months    Hyperlipidemia-   intolerant to lipitor/pravachol , calculated 10 yr cv risk 16%,   She does not want to try another statin, advised low fat     Osteopenia-   cont vit D/ Ca RDA  Update dexa 4/2020    GERD-   Worse, step up from prilosec to protonix 40  Avoid food triggers, smaller meals  H/o esophageal dilation, will refer to GLST, previously seeing Dr. Nathaly Pruitt. BMI 38  Discussed weight loss strategies, limit carbs rice/avoid apple juice      Follow-up Disposition:  Return in about 3 months or if symptoms worsen or fail to improve. Patient understands plan of care. Patient has provided input and agrees with goals. Addendum:  Adenomatous polyp on CRCS 5/2015, rpt 3 years. Referred to GI.

## 2018-06-19 NOTE — PATIENT INSTRUCTIONS

## 2018-09-12 ENCOUNTER — HOSPITAL ENCOUNTER (OUTPATIENT)
Dept: LAB | Age: 73
Discharge: HOME | End: 2018-09-12

## 2018-09-12 LAB — XX-LABCORP SPECIMEN COL,LCBCF: NORMAL

## 2018-09-12 PROCEDURE — 99001 SPECIMEN HANDLING PT-LAB: CPT | Performed by: FAMILY MEDICINE

## 2018-09-13 LAB
ALBUMIN SERPL-MCNC: 4 G/DL (ref 3.5–4.8)
ALBUMIN/GLOB SERPL: 1.2 {RATIO} (ref 1.2–2.2)
ALP SERPL-CCNC: 84 IU/L (ref 39–117)
ALT SERPL-CCNC: 15 IU/L (ref 0–32)
AST SERPL-CCNC: 16 IU/L (ref 0–40)
BILIRUB SERPL-MCNC: 0.4 MG/DL (ref 0–1.2)
BUN SERPL-MCNC: 10 MG/DL (ref 8–27)
BUN/CREAT SERPL: 13 (ref 12–28)
CALCIUM SERPL-MCNC: 9.3 MG/DL (ref 8.7–10.3)
CHLORIDE SERPL-SCNC: 102 MMOL/L (ref 96–106)
CO2 SERPL-SCNC: 25 MMOL/L (ref 20–29)
CREAT SERPL-MCNC: 0.8 MG/DL (ref 0.57–1)
GLOBULIN SER CALC-MCNC: 3.4 G/DL (ref 1.5–4.5)
GLUCOSE SERPL-MCNC: 101 MG/DL (ref 65–99)
HBA1C MFR BLD: 5.6 % (ref 4.8–5.6)
POTASSIUM SERPL-SCNC: 4.3 MMOL/L (ref 3.5–5.2)
PROT SERPL-MCNC: 7.4 G/DL (ref 6–8.5)
SODIUM SERPL-SCNC: 142 MMOL/L (ref 134–144)

## 2018-09-20 ENCOUNTER — OFFICE VISIT (OUTPATIENT)
Dept: FAMILY MEDICINE CLINIC | Age: 73
End: 2018-09-20

## 2018-09-20 VITALS
HEIGHT: 59 IN | BODY MASS INDEX: 37.98 KG/M2 | TEMPERATURE: 97.9 F | WEIGHT: 188.4 LBS | HEART RATE: 64 BPM | SYSTOLIC BLOOD PRESSURE: 150 MMHG | RESPIRATION RATE: 14 BRPM | OXYGEN SATURATION: 96 % | DIASTOLIC BLOOD PRESSURE: 82 MMHG

## 2018-09-20 DIAGNOSIS — R73.01 IFG (IMPAIRED FASTING GLUCOSE): Primary | ICD-10-CM

## 2018-09-20 DIAGNOSIS — E78.00 PURE HYPERCHOLESTEROLEMIA: ICD-10-CM

## 2018-09-20 DIAGNOSIS — K21.9 GASTROESOPHAGEAL REFLUX DISEASE, ESOPHAGITIS PRESENCE NOT SPECIFIED: ICD-10-CM

## 2018-09-20 DIAGNOSIS — I10 WHITE COAT SYNDROME WITH HYPERTENSION: ICD-10-CM

## 2018-09-20 DIAGNOSIS — I10 ESSENTIAL HYPERTENSION: ICD-10-CM

## 2018-09-20 RX ORDER — OMEPRAZOLE 40 MG/1
40 CAPSULE, DELAYED RELEASE ORAL DAILY
COMMUNITY
End: 2018-12-20 | Stop reason: SDUPTHER

## 2018-09-20 NOTE — PROGRESS NOTES
1. Have you been to the ER, urgent care clinic since your last visit? Hospitalized since your last visit? No    2. Have you seen or consulted any other health care providers outside of the [address] [place] [address] since your last visit? Include any pap smears or colon screening.  No      Chief Complaint   Patient presents with    Hypertension    Cholesterol Problem    GERD    Other     Pre-DM

## 2018-09-20 NOTE — MR AVS SNAPSHOT
[address] [place] [address] 
[phone] Patient: Ju Funk MRN: [id number] Visit Information Angie Bose Personal Médico Departamento Teléfono del Dep. Número de visita 9/20/2018  9:45 AM Jessica Yanez, [address] [place] [address] Road [phone] Follow-up Instructions Return in about 3 months (around 12/20/2018), or if symptoms worsen or fail to improve. Upcoming Health Maintenance Date Due Influenza Age 5 to Adult 9/20/2019* MEDICARE YEARLY EXAM 3/20/2019 GLAUCOMA SCREENING Q2Y 5/16/2019 BREAST CANCER SCRN MAMMOGRAM 10/25/2019 COLONOSCOPY 5/22/2025 DTaP/Tdap/Td series (2 - Td) 9/26/2026 *Topic was postponed. The date shown is not the original due date. Alergias  Review Complete El: 9/20/2018 Por: Anne Munoz LPN A partir del:  9/20/2018 Intensidad Anotado Tipo de reacción [facility] Bystolic [Nebivolol]  61/63/3765    Other (comments) Leg cramps Norvasc [Amlodipine]  02/15/2012    Swelling Pravachol [Pravastatin]  05/03/2016   Side Effect Myalgia Vacunas actuales Revisadas el:  9/26/2016 Pennye Loser Influenza High Dose Vaccine PF 12/1/2017, 9/26/2016 Influenza Vaccine 10/14/2014, 10/29/2013 Influenza Vaccine (Quad) PF 10/19/2015 Influenza Vaccine Split 10/2/2012 Pneumococcal Conjugate (PCV-13) 10/19/2015 ZZZ-RETIRED (DO NOT USE) Pneumococcal Vaccine (Unspecified Type) 1/1/2010 Zoster Vaccine, Live 1/1/2010 No revisadas esta visita You Were Diagnosed With   
  
 Charmayne Decent IFG (impaired fasting glucose)    -  Primary ICD-10-CM: R73.01 
ICD-9-CM: 790.21 Pure hypercholesterolemia     ICD-10-CM: E78.00 ICD-9-CM: 272.0 Gastroesophageal reflux disease, esophagitis presence not specified     ICD-10-CM: K21.9 ICD-9-CM: 530.81   
 Essential hypertension     ICD-10-CM: I10 
ICD-9-CM: 401.9 White coat syndrome with hypertension     ICD-10-CM: I10 
ICD-9-CM: 401.9 Partes vitales PS Pulso Temperatura Resp Colp ( percentil de crecimiento) Peso (percentil de crecimiento) 150/82 (BP 1 Location: Left arm, BP Patient Position: Sitting) 64 97.9 °F (36.6 °C) (Oral) 14 4' 11\" (1.499 m) 188 lb 6.4 oz (85.5 kg) SpO2 BMI (IMC) Estado obstétrico Estatus de tabaquísmo 96% 38.05 kg/m2 Hysterectomy Never Smoker Historial de signos vitales BMI and BSA Data Body Mass Index Body Surface Area 38.05 kg/m 2 1.89 m 2 [facility] Name Phone [address] [facility], [address] [place] [address] [phone] Kate lista de medicamentos actualizada Lista actualizada 9/20/18 10:01 AM.  Flaco Del Rosario use kate lista de medicamentos más reciente. Blood-Glucose Meter monitoring kit También conocido will:  Yesica Croft Use daily for glucose monitoring DX E11.9  
  
 cetirizine 10 mg tablet También conocido will:  ZyrTEC Take 1 Tab by mouth daily. FISH OIL 1,000 mg Cap Medicamento genérico:  omega-3 fatty acids-vitamin e Take 1 Cap by mouth daily. fluticasone 50 mcg/actuation nasal spray También conocido will:  FLONASE  
  
 * glucose blood VI test strips strip También conocido will:  ASCENSIA AUTODISC VI, ONE TOUCH ULTRA TEST VI One Touch Ultra Use daily for blood glucose monitoring. DX: E11.9  
  
 * glucose blood VI test strips strip También conocido will:  blood glucose test  
DX: E11.9 DM check fasting blood glucose once daily  
  
 hydroCHLOROthiazide 25 mg tablet También conocido will:  HYDRODIURIL  
TAKE 1 TABLET EVERY DAY  
  
 multivitamin tablet También conocido will:  ONE A DAY Take 1 Tab by mouth daily. naproxen 500 mg tablet También conocido will:  NAPROSYN  
 Take 1 Tab by mouth two (2) times daily (with meals). omeprazole 40 mg capsule También conocido will:  Dotson Ibrahim Take 40 mg by mouth daily. One Touch Delica 33 gauge Misc Medicamento genérico:  lancets USE ONE LANCET TO CHECK BLOOD SUGARS ONCE DAILY pantoprazole 40 mg tablet También conocido will:  PROTONIX Take 1 Tab by mouth daily. sucralfate 1 gram tablet También conocido will:  CARAFATE  
  
 traMADol 50 mg tablet También conocido will:  ULTRAM  
Take 1 Tab by mouth every eight (8) hours as needed for Pain. Max Daily Amount: 150 mg.  
  
 * Aviso:  Esta lista contiene medicamentos que son iguales a otros medicamentos recetados para usted. Mary las instrucciones con cuidado y pida a kate personal médico que revise la lista de medicamentos y las instrucciones correspondientes con usted. Instrucciones de seguimiento Return in about 3 months (around 12/20/2018), or if symptoms worsen or fail to improve. Por hacer 12/20/2018 Lab:  LIPID PANEL   
  
 12/20/2018 Lab:  METABOLIC PANEL, COMPREHENSIVE Instrucciones para el Paciente DASH Diet: Care Instructions Your Care Instructions The DASH diet is an eating plan that can help lower your blood pressure. DASH stands for Dietary Approaches to Stop Hypertension. Hypertension is high blood pressure. The DASH diet focuses on eating foods that are high in calcium, potassium, and magnesium. These nutrients can lower blood pressure. The foods that are highest in these nutrients are fruits, vegetables, low-fat dairy products, nuts, seeds, and legumes. But taking calcium, potassium, and magnesium supplements instead of eating foods that are high in those nutrients does not have the same effect. The DASH diet also includes whole grains, fish, and poultry.  
The DASH diet is one of several lifestyle changes your doctor may recommend to lower your high blood pressure. Your doctor may also want you to decrease the amount of sodium in your diet. Lowering sodium while following the DASH diet can lower blood pressure even further than just the DASH diet alone. Follow-up care is a key part of your treatment and safety. Be sure to make and go to all appointments, and call your doctor if you are having problems. It's also a good idea to know your test results and keep a list of the medicines you take. How can you care for yourself at home? Following the DASH diet · Eat 4 to 5 servings of fruit each day. A serving is 1 medium-sized piece of fruit, ½ cup chopped or canned fruit, 1/4 cup dried fruit, or 4 ounces (½ cup) of fruit juice. Choose fruit more often than fruit juice. · Eat 4 to 5 servings of vegetables each day. A serving is 1 cup of lettuce or raw leafy vegetables, ½ cup of chopped or cooked vegetables, or 4 ounces (½ cup) of vegetable juice. Choose vegetables more often than vegetable juice. · Get 2 to 3 servings of low-fat and fat-free dairy each day. A serving is 8 ounces of milk, 1 cup of yogurt, or 1 ½ ounces of cheese. · Eat 6 to 8 servings of grains each day. A serving is 1 slice of bread, 1 ounce of dry cereal, or ½ cup of cooked rice, pasta, or cooked cereal. Try to choose whole-grain products as much as possible. · Limit lean meat, poultry, and fish to 2 servings each day. A serving is 3 ounces, about the size of a deck of cards. · Eat 4 to 5 servings of nuts, seeds, and legumes (cooked dried beans, lentils, and split peas) each week. A serving is 1/3 cup of nuts, 2 tablespoons of seeds, or ½ cup of cooked beans or peas. · Limit fats and oils to 2 to 3 servings each day. A serving is 1 teaspoon of vegetable oil or 2 tablespoons of salad dressing. · Limit sweets and added sugars to 5 servings or less a week. A serving is 1 tablespoon jelly or jam, ½ cup sorbet, or 1 cup of lemonade. · Eat less than 2,300 milligrams (mg) of sodium a day. If you limit your sodium to 1,500 mg a day, you can lower your blood pressure even more. Tips for success · Start small. Do not try to make dramatic changes to your diet all at once. You might feel that you are missing out on your favorite foods and then be more likely to not follow the plan. Make small changes, and stick with them. Once those changes become habit, add a few more changes. · Try some of the following: ¨ Make it a goal to eat a fruit or vegetable at every meal and at snacks. This will make it easy to get the recommended amount of fruits and vegetables each day. ¨ Try yogurt topped with fruit and nuts for a snack or healthy dessert. ¨ Add lettuce, tomato, cucumber, and onion to sandwiches. ¨ Combine a ready-made pizza crust with low-fat mozzarella cheese and lots of vegetable toppings. Try using tomatoes, squash, spinach, broccoli, carrots, cauliflower, and onions. ¨ Have a variety of cut-up vegetables with a low-fat dip as an appetizer instead of chips and dip. ¨ Sprinkle sunflower seeds or chopped almonds over salads. Or try adding chopped walnuts or almonds to cooked vegetables. ¨ Try some vegetarian meals using beans and peas. Add garbanzo or kidney beans to salads. Make burritos and tacos with mashed elizaebth beans or black beans. Where can you learn more? Go to http://michael-allyssa.info/. Enter T550 in the search box to learn more about \"DASH Diet: Care Instructions. \" Current as of: December 6, 2017 Content Version: 11.7 © 8118-7605 [employer]. Care instructions adapted under license by [employer] (which disclaims liability or warranty for this information). If you have questions about a medical condition or this instruction, always ask your healthcare professional. [address] any warranty or liability for your use of this information. Introducing [facility] SERVICES! Estimado Bertha : 
Juan por solicitar deandre cuenta de MyChart margarita. Nuestros registros indican que usted ya tiene deandre cuenta MyCraisa Norton. Usted puede acceder a kate cuenta en cualquier momento en [email or web address] Sabía usted que usted puede acceder a kate hospital y las instrucciones de pita ER en cualquier momento en MyChart ? También puede revisar [facility] de las pruebas de kate hospitalización o visita a urgencias . Información Adicional 
 
Si tiene alguna pregunta , por favor visite la sección de preguntas frecuentes del sitio web MyChart en [email or web address] . Recuerde, MyChart NO es que se utilizará para las necesidades urgentes. Para emergencias médicas , llame al 911 . Ahora disponible en kate iPhone y Android ! Por favor proporcione rasheed resumen de la documentación de cuidado a kate próximo proveedor. Your primary care clinician is listed as Vicki Mcleod. If you have any questions after today's visit, please call [phone].

## 2018-09-20 NOTE — PROGRESS NOTES
Prince Rapp, 74 y.o.,  female    SUBJECTIVE  Routine ff-up    HTN-  Taking  HCTZ. She reports being on multiple hypertensives in the past, and cut out BP meds due to hypotension/ and  white coat hypertension. HL- says intolerant to lipitor/pravachol pt reports muscle aches. Says follows healthy diet. She is sedentary. Prediabetes- checks her sugars 90- 100's. Says tries to follow diet. Reviewed labs    GERD-established care with dr. Raghav Rutledge, tried on protonix, she reports to have constipation on this and went back to taking priolosec. She has h/o  periodic EGD dilation. ROS:  See HPI, all others negative        Patient Active Problem List   Diagnosis Code    IFG (impaired fasting glucose) R73.01    Hyperlipidemia E78.5    GERD (gastroesophageal reflux disease) K21.9    Alpha thalassemia ([facility]) D56.0    Obesity E66.9    Carotid stenosis, left I65.22    Osteopenia M85.80    Anxiety F41.9    Hypertensive retinopathy H35.039    Essential hypertension I10    Advance directive discussed with patient-DARIUSZ Mon Z71.89    Chronic scapular pain-intermittent M89.8X1, G89.29    Severe obesity (BMI 35.0-39.9) ([facility]) E66.01       Current Outpatient Prescriptions   Medication Sig Dispense Refill    lisinopril (PRINIVIL, ZESTRIL) 20 mg tablet Take 1 Tab by mouth daily. 90 Tab 0    hydroCHLOROthiazide (HYDRODIURIL) 25 mg tablet TAKE 1 TABLET EVERY DAY 90 Tab 1    traMADol (ULTRAM) 50 mg tablet Take 1 Tab by mouth every eight (8) hours as needed for Pain. Max Daily Amount: 150 mg. 6 Tab 0    omeprazole (PRILOSEC) 40 mg capsule       ONE TOUCH DELICA 33 gauge misc USE ONE LANCET TO CHECK BLOOD SUGARS ONCE DAILY 100 Lancet 3    sucralfate (CARAFATE) 1 gram tablet       naproxen (NAPROSYN) 500 mg tablet Take 1 Tab by mouth two (2) times daily (with meals). 30 Tab 0    cetirizine (ZYRTEC) 10 mg tablet Take 1 Tab by mouth daily.  30 Tab 0    glucose blood VI test strips (ASCENSIA AUTODISC VI, ONE TOUCH ULTRA TEST VI) strip One Touch Ultra Use daily for blood glucose monitoring. DX: E11.9 3 Package 3    Blood-Glucose Meter (ONETOUCH ULTRAMINI) monitoring kit Use daily for glucose monitoring DX E11.9 1 Kit 0    fluticasone (FLONASE) 50 mcg/actuation nasal spray       omega-3 fatty acids-vitamin e (FISH OIL) 1,000 mg Cap Take 1 Cap by mouth daily.  multivitamin (ONE A DAY) tablet Take 1 Tab by mouth daily.  glucose blood VI test strips (BLOOD GLUCOSE TEST) strip DX: E11.9 DM check fasting blood glucose once daily 200 Strip 2       Allergies   Allergen Reactions    Bystolic [Nebivolol] Other (comments)     Leg cramps    Norvasc [Amlodipine] Swelling    Pravachol [Pravastatin] Myalgia       Past Medical History:   Diagnosis Date    Alpha thalassemia ([email or web address] 75[facility] 2/15/2012    Anxiety     BMI 40.0-44.9, adult ([email or web address] 75.) 5/10/2017    Breast tumor     benign cyst    Carotid stenosis, left     LICA <28%    Esophageal stricture     dr. Mirna Allred, s/p balloon dilation 9/2016    Gastric polyps     GERD (gastroesophageal reflux disease)     endoscopy 7/08- hiatal hernia/gastric polyps, esophagitis 9/2016 on prilosec/carafate    Headache(784.0)     Hypertension     nuclear stress test neg 1/2009    IFG (impaired fasting glucose)     Osteopenia        Social History     Social History    Marital status:      Spouse name: N/A    Number of children: N/A    Years of education: N/A     Occupational History    Not on file.      Social History Main Topics    Smoking status: Never Smoker    Smokeless tobacco: Never Used    Alcohol use No      Comment: \"Often\"    Drug use: No    Sexual activity: Yes     Partners: Male     Other Topics Concern    Not on file     Social History Narrative       Family History   Problem Relation Age of Onset    Cancer Mother          OBJECTIVE    Physical Exam:     Visit Vitals    BP (!) 162/96 (BP 1 Location: Left arm, BP Patient Position: Sitting)    Pulse 69  Temp 97.9 °F (36.6 °C) (Oral)    Resp 16    Ht 4' 11\" (1.499 m)    Wt 192 lb 3.2 oz (87.2 kg)    SpO2 96%    BMI 38.82 kg/m2       General: alert, n no apparent distress or pain  CVS: normal rate, regular rhythm, distinct S1 and S2  Lungs:clear to ausculation bilaterally, no crackles, wheezing or rhonchi noted  Abdomen: soft, NT, ND  Skin: warm, no lesions, rashes noted  Psych:  mood and affect normal  Results for orders placed or performed during the hospital encounter of 09/12/18   [facility] SPECIMEN COL   Result Value Ref Range    [facility] SPECIMEN COLLN. Specimens collected/sent to [facility]. Please direct inquiries to ([phone]). ASSESSMENT/PLAN  Eb Nguyen was seen today for hypertension, cholesterol problem, other and annual wellness visit. Diagnoses and associated orders for this visit:  White coat with Essential hypertension-  Home BP monitor is consistent   cont HCTZ    advise ff-up  In 3 months     Prediabetes-   a1c 5.8>6.2>5.8>5.9>5.8>5.6   cont TLCs, monitoring  Lipid panel/cmp in 3 months    Hyperlipidemia-   intolerant to lipitor/pravachol , calculated 10 yr cv risk 16%,   She does not want to try another statin, advised low fat  Diet, monitoring    Osteopenia-   cont vit D/ Ca RDA  Update dexa 4/2020    GERD-   Fairly stable  Cont prilosec (protonix was constipating)  Avoid food triggers, smaller meals  H/o esophageal dilation   Following dr. Tona Shaikh    BMI 38  Discussed weight loss strategies, limit carbs rice/avoid apple juice      Follow-up Disposition:  Return in about 3 months or if symptoms worsen or fail to improve. Patient understands plan of care. Patient has provided input and agrees with goals.

## 2018-09-20 NOTE — PATIENT INSTRUCTIONS

## 2018-10-15 ENCOUNTER — TELEPHONE (OUTPATIENT)
Dept: FAMILY MEDICINE CLINIC | Age: 73
End: 2018-10-15

## 2018-10-15 NOTE — TELEPHONE ENCOUNTER
Mr. Melo Healy called and states his wife is scheduled for an endoscopy on 11/6/18 with a Dr. Isaias Rendon [phone] and they are asking for her to get an EKG through their PCP's office. I explained that I thought usually that order was given by the ordering office as we can not do that in office. He said they are not requesting clearance just an EKG. I would be happy to send a message back to the nurses and see what they recommend. She can be reached at [phone].

## 2018-10-18 ENCOUNTER — HOSPITAL ENCOUNTER (OUTPATIENT)
Dept: LAB | Age: 73
Discharge: HOME | End: 2018-10-18
Payer: MEDICARE

## 2018-10-18 DIAGNOSIS — Z01.818 PRE-OP EVALUATION: ICD-10-CM

## 2018-10-18 PROCEDURE — 93005 ELECTROCARDIOGRAM TRACING: CPT

## 2018-10-19 LAB
ATRIAL RATE: 66 BPM
CALCULATED P AXIS, ECG09: 39 DEGREES
CALCULATED R AXIS, ECG10: -6 DEGREES
CALCULATED T AXIS, ECG11: 64 DEGREES
DIAGNOSIS, 93000: NORMAL
P-R INTERVAL, ECG05: 168 MS
Q-T INTERVAL, ECG07: 394 MS
QRS DURATION, ECG06: 86 MS
QTC CALCULATION (BEZET), ECG08: 413 MS
VENTRICULAR RATE, ECG03: 66 BPM

## 2018-10-29 ENCOUNTER — HOSPITAL ENCOUNTER (OUTPATIENT)
Dept: MAMMOGRAPHY | Age: 73
Discharge: HOME | End: 2018-10-29
Attending: FAMILY MEDICINE
Payer: MEDICARE

## 2018-10-29 DIAGNOSIS — Z12.31 VISIT FOR SCREENING MAMMOGRAM: ICD-10-CM

## 2018-10-29 DIAGNOSIS — N64.89 BREAST ASYMMETRY: Primary | ICD-10-CM

## 2018-10-29 PROCEDURE — 77067 SCR MAMMO BI INCL CAD: CPT

## 2018-11-23 ENCOUNTER — HOSPITAL ENCOUNTER (OUTPATIENT)
Dept: MAMMOGRAPHY | Age: 73
Discharge: HOME | End: 2018-11-23
Attending: FAMILY MEDICINE
Payer: MEDICARE

## 2018-11-23 ENCOUNTER — HOSPITAL ENCOUNTER (OUTPATIENT)
Dept: ULTRASOUND IMAGING | Age: 73
Discharge: HOME | End: 2018-11-23
Attending: FAMILY MEDICINE
Payer: MEDICARE

## 2018-11-23 DIAGNOSIS — N64.89 BREAST ASYMMETRY: ICD-10-CM

## 2018-11-23 PROCEDURE — 76642 ULTRASOUND BREAST LIMITED: CPT

## 2018-11-23 PROCEDURE — 77065 DX MAMMO INCL CAD UNI: CPT

## 2018-11-23 PROCEDURE — 77061 BREAST TOMOSYNTHESIS UNI: CPT

## 2018-12-10 ENCOUNTER — HOSPITAL ENCOUNTER (OUTPATIENT)
Dept: LAB | Age: 73
Discharge: HOME | End: 2018-12-10
Payer: MEDICARE

## 2018-12-10 LAB
ALBUMIN SERPL-MCNC: 3.6 G/DL (ref 3.4–5)
ALBUMIN/GLOB SERPL: 0.9 {RATIO} (ref 0.8–1.7)
ALP SERPL-CCNC: 85 U/L (ref 45–117)
ALT SERPL-CCNC: 22 U/L (ref 13–56)
ANION GAP SERPL CALC-SCNC: 8 MMOL/L (ref 3–18)
AST SERPL-CCNC: 14 U/L (ref 15–37)
BILIRUB SERPL-MCNC: 0.4 MG/DL (ref 0.2–1)
BUN SERPL-MCNC: 11 MG/DL (ref 7–18)
BUN/CREAT SERPL: 14 (ref 12–20)
CALCIUM SERPL-MCNC: 8.6 MG/DL (ref 8.5–10.1)
CHLORIDE SERPL-SCNC: 105 MMOL/L (ref 100–108)
CHOLEST SERPL-MCNC: 219 MG/DL
CO2 SERPL-SCNC: 28 MMOL/L (ref 21–32)
CREAT SERPL-MCNC: 0.77 MG/DL (ref 0.6–1.3)
GLOBULIN SER CALC-MCNC: 3.9 G/DL (ref 2–4)
GLUCOSE SERPL-MCNC: 91 MG/DL (ref 74–99)
HDLC SERPL-MCNC: 47 MG/DL (ref 40–60)
HDLC SERPL: 4.7 {RATIO} (ref 0–5)
LDLC SERPL CALC-MCNC: 146.8 MG/DL (ref 0–100)
LIPID PROFILE,FLP: ABNORMAL
POTASSIUM SERPL-SCNC: 4 MMOL/L (ref 3.5–5.5)
PROT SERPL-MCNC: 7.5 G/DL (ref 6.4–8.2)
SODIUM SERPL-SCNC: 141 MMOL/L (ref 136–145)
TRIGL SERPL-MCNC: 126 MG/DL (ref ?–150)
VLDLC SERPL CALC-MCNC: 25.2 MG/DL

## 2018-12-10 PROCEDURE — 36415 COLL VENOUS BLD VENIPUNCTURE: CPT

## 2018-12-10 PROCEDURE — 80053 COMPREHEN METABOLIC PANEL: CPT

## 2018-12-10 PROCEDURE — 80061 LIPID PANEL: CPT

## 2018-12-20 ENCOUNTER — OFFICE VISIT (OUTPATIENT)
Dept: FAMILY MEDICINE CLINIC | Age: 73
End: 2018-12-20

## 2018-12-20 VITALS
SYSTOLIC BLOOD PRESSURE: 152 MMHG | HEIGHT: 59 IN | OXYGEN SATURATION: 97 % | RESPIRATION RATE: 17 BRPM | DIASTOLIC BLOOD PRESSURE: 96 MMHG | WEIGHT: 189.6 LBS | TEMPERATURE: 98 F | BODY MASS INDEX: 38.22 KG/M2 | HEART RATE: 66 BPM

## 2018-12-20 DIAGNOSIS — I10 WHITE COAT SYNDROME WITH HYPERTENSION: Primary | ICD-10-CM

## 2018-12-20 DIAGNOSIS — I10 ESSENTIAL HYPERTENSION: ICD-10-CM

## 2018-12-20 DIAGNOSIS — K21.9 GASTROESOPHAGEAL REFLUX DISEASE, ESOPHAGITIS PRESENCE NOT SPECIFIED: ICD-10-CM

## 2018-12-20 DIAGNOSIS — E66.01 SEVERE OBESITY WITH BODY MASS INDEX (BMI) OF 35.0 TO 39.9 WITH SERIOUS COMORBIDITY (HCC): ICD-10-CM

## 2018-12-20 DIAGNOSIS — M85.80 OSTEOPENIA, UNSPECIFIED LOCATION: ICD-10-CM

## 2018-12-20 DIAGNOSIS — E78.00 PURE HYPERCHOLESTEROLEMIA: ICD-10-CM

## 2018-12-20 DIAGNOSIS — R73.01 IFG (IMPAIRED FASTING GLUCOSE): ICD-10-CM

## 2018-12-20 RX ORDER — OMEPRAZOLE 40 MG/1
40 CAPSULE, DELAYED RELEASE ORAL DAILY
Qty: 90 CAP | Refills: 3 | Status: SHIPPED | OUTPATIENT
Start: 2018-12-20 | End: 2019-03-21 | Stop reason: ALTCHOICE

## 2018-12-20 NOTE — PROGRESS NOTES
Burdette Habermann, 74 y.o.,  female    SUBJECTIVE  Routine ff-up    HTN-  Taking  HCTZ. Home BP readings 120/80's. She reports being on multiple hypertensives in the past, and cut out BP meds due to hypotension/ and  white coat hypertension. HL- says intolerant to lipitor/pravachol pt reports muscle aches. Reviewed labs, does not want to try any other statin. Prediabetes-    GERD-on prilosec, requesting refills. Reports recent EGD showing hiatal hernia, dr. Cari Hernandez. She has h/o  periodic EGD dilation. ROS:  See HPI, all others negative        Patient Active Problem List   Diagnosis Code    IFG (impaired fasting glucose) R73.01    Hyperlipidemia E78.5    GERD (gastroesophageal reflux disease) K21.9    Alpha thalassemia ([facility]) D56.0    Obesity E66.9    Carotid stenosis, left I65.22    Osteopenia M85.80    Anxiety F41.9    Hypertensive retinopathy H35.039    Essential hypertension I10    Advance directive discussed with patient-DARIUSZ Montgomery Z71.89    Chronic scapular pain-intermittent M89.8X1, G89.29    Severe obesity (BMI 35.0-39.9) ([facility]) E66.01       Current Outpatient Prescriptions   Medication Sig Dispense Refill    lisinopril (PRINIVIL, ZESTRIL) 20 mg tablet Take 1 Tab by mouth daily. 90 Tab 0    hydroCHLOROthiazide (HYDRODIURIL) 25 mg tablet TAKE 1 TABLET EVERY DAY 90 Tab 1    traMADol (ULTRAM) 50 mg tablet Take 1 Tab by mouth every eight (8) hours as needed for Pain. Max Daily Amount: 150 mg. 6 Tab 0    omeprazole (PRILOSEC) 40 mg capsule       ONE TOUCH DELICA 33 gauge misc USE ONE LANCET TO CHECK BLOOD SUGARS ONCE DAILY 100 Lancet 3    sucralfate (CARAFATE) 1 gram tablet       naproxen (NAPROSYN) 500 mg tablet Take 1 Tab by mouth two (2) times daily (with meals). 30 Tab 0    cetirizine (ZYRTEC) 10 mg tablet Take 1 Tab by mouth daily.  30 Tab 0    glucose blood VI test strips (ASCENSIA AUTODISC VI, ONE TOUCH ULTRA TEST VI) strip One Touch Ultra Use daily for blood glucose monitoring. DX: E11.9 3 Package 3    Blood-Glucose Meter (ONETOUCH ULTRAMINI) monitoring kit Use daily for glucose monitoring DX E11.9 1 Kit 0    fluticasone (FLONASE) 50 mcg/actuation nasal spray       omega-3 fatty acids-vitamin e (FISH OIL) 1,000 mg Cap Take 1 Cap by mouth daily.  multivitamin (ONE A DAY) tablet Take 1 Tab by mouth daily.  glucose blood VI test strips (BLOOD GLUCOSE TEST) strip DX: E11.9 DM check fasting blood glucose once daily 200 Strip 2       Allergies   Allergen Reactions    Bystolic [Nebivolol] Other (comments)     Leg cramps    Norvasc [Amlodipine] Swelling    Pravachol [Pravastatin] Myalgia       Past Medical History:   Diagnosis Date    Alpha thalassemia ([facility] 75.) 2/15/2012    Anxiety     BMI 40.0-44.9, adult ([facility] 75.) 5/10/2017    Breast tumor     benign cyst    Carotid stenosis, left     LICA <45%    Esophageal stricture     dr. Peter Rutherford, s/p balloon dilation 9/2016    Gastric polyps     GERD (gastroesophageal reflux disease)     endoscopy 7/08- hiatal hernia/gastric polyps, esophagitis 9/2016 on prilosec/carafate    Headache(784.0)     Hypertension     nuclear stress test neg 1/2009    IFG (impaired fasting glucose)     Osteopenia        Social History     Social History    Marital status:      Spouse name: N/A    Number of children: N/A    Years of education: N/A     Occupational History    Not on file.      Social History Main Topics    Smoking status: Never Smoker    Smokeless tobacco: Never Used    Alcohol use No      Comment: \"Often\"    Drug use: No    Sexual activity: Yes     Partners: Male     Other Topics Concern    Not on file     Social History Narrative       Family History   Problem Relation Age of Onset    Cancer Mother          OBJECTIVE    Physical Exam:     Visit Vitals    BP (!) 162/96 (BP 1 Location: Left arm, BP Patient Position: Sitting)    Pulse 69    Temp 97.9 °F (36.6 °C) (Oral)    Resp 16    Ht 4' 11\" (1.499 m)  Wt 192 lb 3.2 oz (87.2 kg)    SpO2 96%    BMI 38.82 kg/m2       General: alert, n no apparent distress or pain  CVS: normal rate, regular rhythm, distinct S1 and S2  Lungs:clear to ausculation bilaterally, no crackles, wheezing or rhonchi noted  Abdomen: soft, NT, ND  Skin: warm, no lesions, rashes noted  Psych:  mood and affect normal  Results for orders placed or performed during the hospital encounter of 12/10/18   LIPID PANEL   Result Value Ref Range    LIPID PROFILE          Cholesterol, total 219 (H) <200 MG/DL    Triglyceride 126 <150 MG/DL    HDL Cholesterol 47 40 - 60 MG/DL    LDL, calculated 146.8 (H) 0 - 100 MG/DL    VLDL, calculated 25.2 MG/DL    CHOL/HDL Ratio 4.7 0 - 5.0     METABOLIC PANEL, COMPREHENSIVE   Result Value Ref Range    Sodium 141 136 - 145 mmol/L    Potassium 4.0 3.5 - 5.5 mmol/L    Chloride 105 100 - 108 mmol/L    CO2 28 21 - 32 mmol/L    Anion gap 8 3.0 - 18 mmol/L    Glucose 91 74 - 99 mg/dL    BUN 11 7.0 - 18 MG/DL    Creatinine 0.77 0.6 - 1.3 MG/DL    BUN/Creatinine ratio 14 12 - 20      GFR est AA >60 >60 ml/min/1.73m2    GFR est non-AA >60 >60 ml/min/1.73m2    Calcium 8.6 8.5 - 10.1 MG/DL    Bilirubin, total 0.4 0.2 - 1.0 MG/DL    ALT (SGPT) 22 13 - 56 U/L    AST (SGOT) 14 (L) 15 - 37 U/L    Alk. phosphatase 85 45 - 117 U/L    Protein, total 7.5 6.4 - 8.2 g/dL    Albumin 3.6 3.4 - 5.0 g/dL    Globulin 3.9 2.0 - 4.0 g/dL    A-G Ratio 0.9 0.8 - 1.7       ASSESSMENT/PLAN  Bertha was seen today for hypertension, cholesterol problem, other and annual wellness visit.     Diagnoses and associated orders for this visit:  White coat with Essential hypertension-  Home BP monitor is consistent   cont HCTZ    advise ff-up  In 3 months     Prediabetes-   a1c 5.8>6.2>5.8>5.9>5.8>5.6   cont TLCs, monitoring    Hyperlipidemia-   intolerant to lipitor/pravachol , calculated 18.8yr cv risk 12% foa ge  She does not want to try another statin, advised low fat  Diet, monitoring    Osteopenia-   cont vit D/ Ca RDA  Update dexa 4/2020    GERD-   Fairly stable  Cont prilosec (protonix was constipating)  Avoid food triggers, smaller meals  H/o esophageal dilation   Following dr. Davonte Carlos, noted hiatal hernia per pt    BMI 38  Discussed weight loss strategies, limit carbs rice/avoid apple juice      Follow-up Disposition:  Return in about 3 months or if symptoms worsen or fail to improve. Patient understands plan of care. Patient has provided input and agrees with goals.

## 2018-12-20 NOTE — PROGRESS NOTES
1. Have you been to the ER, urgent care clinic since your last visit? Hospitalized since your last visit? No    2. Have you seen or consulted any other health care providers outside of the [facility] since your last visit? Include any pap smears or colon screening.  Gastroenterology 10/10/2018 Dr. Lottie Goldmann    Chief Complaint   Patient presents with    Hypertension    Cholesterol Problem    Other     Pre-DM    GERD

## 2018-12-20 NOTE — PATIENT INSTRUCTIONS
DASH Diet: Care Instructions  Your Care Instructions    The DASH diet is an eating plan that can help lower your blood pressure. DASH stands for Dietary Approaches to Stop Hypertension. Hypertension is high blood pressure. The DASH diet focuses on eating foods that are high in calcium, potassium, and magnesium. These nutrients can lower blood pressure. The foods that are highest in these nutrients are fruits, vegetables, low-fat dairy products, nuts, seeds, and legumes. But taking calcium, potassium, and magnesium supplements instead of eating foods that are high in those nutrients does not have the same effect. The DASH diet also includes whole grains, fish, and poultry. The DASH diet is one of several lifestyle changes your doctor may recommend to lower your high blood pressure. Your doctor may also want you to decrease the amount of sodium in your diet. Lowering sodium while following the DASH diet can lower blood pressure even further than just the DASH diet alone. Follow-up care is a key part of your treatment and safety. Be sure to make and go to all appointments, and call your doctor if you are having problems. It's also a good idea to know your test results and keep a list of the medicines you take. How can you care for yourself at home? Following the DASH diet  · Eat 4 to 5 servings of fruit each day. A serving is 1 medium-sized piece of fruit, ½ cup chopped or canned fruit, 1/4 cup dried fruit, or 4 ounces (½ cup) of fruit juice. Choose fruit more often than fruit juice. · Eat 4 to 5 servings of vegetables each day. A serving is 1 cup of lettuce or raw leafy vegetables, ½ cup of chopped or cooked vegetables, or 4 ounces (½ cup) of vegetable juice. Choose vegetables more often than vegetable juice. · Get 2 to 3 servings of low-fat and fat-free dairy each day. A serving is 8 ounces of milk, 1 cup of yogurt, or 1 ½ ounces of cheese. · Eat 6 to 8 servings of grains each day.  A serving is 1 slice of bread, 1 ounce of dry cereal, or ½ cup of cooked rice, pasta, or cooked cereal. Try to choose whole-grain products as much as possible. · Limit lean meat, poultry, and fish to 2 servings each day. A serving is 3 ounces, about the size of a deck of cards. · Eat 4 to 5 servings of nuts, seeds, and legumes (cooked dried beans, lentils, and split peas) each week. A serving is 1/3 cup of nuts, 2 tablespoons of seeds, or ½ cup of cooked beans or peas. · Limit fats and oils to 2 to 3 servings each day. A serving is 1 teaspoon of vegetable oil or 2 tablespoons of salad dressing. · Limit sweets and added sugars to 5 servings or less a week. A serving is 1 tablespoon jelly or jam, ½ cup sorbet, or 1 cup of lemonade. · Eat less than 2,300 milligrams (mg) of sodium a day. If you limit your sodium to 1,500 mg a day, you can lower your blood pressure even more. Tips for success  · Start small. Do not try to make dramatic changes to your diet all at once. You might feel that you are missing out on your favorite foods and then be more likely to not follow the plan. Make small changes, and stick with them. Once those changes become habit, add a few more changes. · Try some of the following:  ? Make it a goal to eat a fruit or vegetable at every meal and at snacks. This will make it easy to get the recommended amount of fruits and vegetables each day. ? Try yogurt topped with fruit and nuts for a snack or healthy dessert. ? Add lettuce, tomato, cucumber, and onion to sandwiches. ? Combine a ready-made pizza crust with low-fat mozzarella cheese and lots of vegetable toppings. Try using tomatoes, squash, spinach, broccoli, carrots, cauliflower, and onions. ? Have a variety of cut-up vegetables with a low-fat dip as an appetizer instead of chips and dip. ? Sprinkle sunflower seeds or chopped almonds over salads. Or try adding chopped walnuts or almonds to cooked vegetables.   ? Try some vegetarian meals using beans and peas. Add garbanzo or kidney beans to salads. Make burritos and tacos with mashed elizabeth beans or black beans. Where can you learn more? Go to http://michael-allyssa.info/. Enter T149 in the search box to learn more about \"DASH Diet: Care Instructions. \"  Current as of: December 6, 2017  Content Version: 11.8  © 0261-5215 [employer]. Care instructions adapted under license by [employer] (which disclaims liability or warranty for this information). If you have questions about a medical condition or this instruction, always ask your healthcare professional. [address] any warranty or liability for your use of this information.

## 2019-03-21 ENCOUNTER — OFFICE VISIT (OUTPATIENT)
Dept: FAMILY MEDICINE CLINIC | Age: 74
End: 2019-03-21

## 2019-03-21 VITALS
SYSTOLIC BLOOD PRESSURE: 164 MMHG | RESPIRATION RATE: 16 BRPM | HEART RATE: 65 BPM | WEIGHT: 191 LBS | TEMPERATURE: 98.3 F | HEIGHT: 59 IN | BODY MASS INDEX: 38.51 KG/M2 | DIASTOLIC BLOOD PRESSURE: 102 MMHG | OXYGEN SATURATION: 98 %

## 2019-03-21 DIAGNOSIS — Z71.89 ADVANCE CARE PLANNING: ICD-10-CM

## 2019-03-21 DIAGNOSIS — M85.80 OSTEOPENIA, UNSPECIFIED LOCATION: ICD-10-CM

## 2019-03-21 DIAGNOSIS — E78.00 PURE HYPERCHOLESTEROLEMIA: ICD-10-CM

## 2019-03-21 DIAGNOSIS — E66.01 SEVERE OBESITY WITH BODY MASS INDEX (BMI) OF 35.0 TO 39.9 WITH SERIOUS COMORBIDITY (HCC): ICD-10-CM

## 2019-03-21 DIAGNOSIS — K21.9 GASTROESOPHAGEAL REFLUX DISEASE, ESOPHAGITIS PRESENCE NOT SPECIFIED: ICD-10-CM

## 2019-03-21 DIAGNOSIS — I10 ESSENTIAL HYPERTENSION: ICD-10-CM

## 2019-03-21 DIAGNOSIS — I10 WHITE COAT SYNDROME WITH HYPERTENSION: ICD-10-CM

## 2019-03-21 DIAGNOSIS — R73.01 IFG (IMPAIRED FASTING GLUCOSE): ICD-10-CM

## 2019-03-21 DIAGNOSIS — K44.9 HIATAL HERNIA: ICD-10-CM

## 2019-03-21 DIAGNOSIS — Z00.00 MEDICARE ANNUAL WELLNESS VISIT, SUBSEQUENT: Primary | ICD-10-CM

## 2019-03-21 RX ORDER — ESOMEPRAZOLE MAGNESIUM 20 MG/1
20 FOR SUSPENSION ORAL DAILY
Qty: 90 PACKET | Refills: 3 | Status: SHIPPED | OUTPATIENT
Start: 2019-03-21 | End: 2019-03-28 | Stop reason: ALTCHOICE

## 2019-03-21 NOTE — PATIENT INSTRUCTIONS
Medicare Wellness Visit, Female The best way to live healthy is to have a lifestyle where you eat a well-balanced diet, exercise regularly, limit alcohol use, and quit all forms of tobacco/nicotine, if applicable. Regular preventive services are another way to keep healthy. Preventive services (vaccines, screening tests, monitoring & exams) can help personalize your care plan, which helps you manage your own care. Screening tests can find health problems at the earliest stages, when they are easiest to treat. [facility] follows the current, evidence-based guidelines published by the [facility] ([facility]) when recommending preventive services for our patients. Because we follow these guidelines, sometimes recommendations change over time as research supports it. (For example, mammograms used to be recommended annually. Even though Medicare will still pay for an annual mammogram, the newer guidelines recommend a mammogram every two years for women of average risk.) Of course, you and your doctor may decide to screen more often for some diseases, based on your risk and your health status. Preventive services for you include: - Medicare offers their members a free annual wellness visit, which is time for you and your primary care provider to discuss and plan for your preventive service needs. Take advantage of this benefit every year! 
-All adults over the age of 72 should receive the recommended pneumonia vaccines. Current USPSTF guidelines recommend a series of two vaccines for the best pneumonia protection.  
-All adults should have a flu vaccine yearly and a tetanus vaccine every 10 years. All adults age 61 and older should receive a shingles vaccine once in their lifetime.   
-A bone mass density test is recommended when a woman turns 65 to screen for osteoporosis. This test is only recommended one time, as a screening. Some providers will use this same test as a disease monitoring tool if you already have osteoporosis. -All adults age 38-68 who are overweight should have a diabetes screening test once every three years.  
-Other screening tests and preventive services for persons with diabetes include: an eye exam to screen for diabetic retinopathy, a kidney function test, a foot exam, and stricter control over your cholesterol.  
-Cardiovascular screening for adults with routine risk involves an electrocardiogram (ECG) at intervals determined by your doctor.  
-Colorectal cancer screenings should be done for adults age 54-65 with no increased risk factors for colorectal cancer. There are a number of acceptable methods of screening for this type of cancer. Each test has its own benefits and drawbacks. Discuss with your doctor what is most appropriate for you during your annual wellness visit. The different tests include: colonoscopy (considered the best screening method), a fecal occult blood test, a fecal DNA test, and sigmoidoscopy. -Breast cancer screenings are recommended every other year for women of normal risk, age 54-69. 
-Cervical cancer screenings for women over age 72 are only recommended with certain risk factors.  
-All adults born between [facility] should be screened once for Hepatitis C. Here is a list of your current Health Maintenance items (your personalized list of preventive services) with a due date: 
Health Maintenance Due Topic Date Due  Shingles Vaccine (1 of 2) 08/24/1993  Pneumococcal Vaccine (2 of 2 - PPSV23) 10/19/2016 [facility] Annual Well Visit  03/20/2019  Glaucoma Screening   05/16/2019

## 2019-03-21 NOTE — ACP (ADVANCE CARE PLANNING)
Advance Care Planning (ACP) Provider Note - Comprehensive     Date of ACP Conversation: 03/21/19  Persons included in Conversation:  patient  Length of ACP Conversation in minutes:  16 minutes    Authorized Decision Maker (if patient is incapable of making informed decisions): This person is:  has  yet to delegate, thinking  and daughter            General ACP for ALL Patients with Decision Making Capacity:   Importance of advance care planning, including choosing a healthcare agent to communicate patient's healthcare decisions if patient lost the ability to make decisions, such as after a sudden illness or accident  Understanding of the healthcare agent role was assessed and information provided  Exploration of values, goals, and preferences if recovery is not expected, even with continued medical treatment in the event of: Imminent death  Severe, permanent brain injury  \"In these circumstances, what matters most to you? \"  Other: still considering    Interventions Provided:  Recommended completion of Advance Directive form after review of ACP materials and conversation with prospective healthcare agent   Recommended communicating the plan and making copies for the healthcare agent, personal physician, and others as appropriate (e.g., health system)  Recommended review of completed ACP document annually or upon change in health status

## 2019-03-21 NOTE — PROGRESS NOTES
This is the Subsequent Medicare Annual Wellness Exam, performed 12 months or more after the Initial AWV or the last Subsequent AWV I have reviewed the patient's medical history in detail and updated the computerized patient record. History Past Medical History:  
Diagnosis Date  Alpha thalassemia ([facility] [address] 2/15/2012  Anxiety  BMI 40.0-44.9, adult ([facility] [address] 5/10/2017  Breast tumor   
 benign cyst  
 Carotid stenosis, left LICA <53%  Esophageal stricture   
 dr. Sherine Ga, s/p balloon dilation 9/2016  Gastric polyps  GERD (gastroesophageal reflux disease)   
 endoscopy 7/08- hiatal hernia/gastric polyps, esophagitis 9/2016 on prilosec/carafate  Headache(784.0)  Hx of colonoscopy 05/26/2015  
 tubular adenoma, rpt 3 years 2018 dr. Sherine Ga  Hypertension   
 nuclear stress test neg 1/2009  IFG (impaired fasting glucose)  Osteopenia  Reflux esophagitis 11/06/2018 EGD, dr. Onofre Mohamud  
  
Past Surgical History:  
Procedure Laterality Date  HX BREAST BIOPSY  1978  
 bilateral breasts  HX HYSTERECTOMY Current Outpatient Medications Medication Sig Dispense Refill  omeprazole (PRILOSEC) 40 mg capsule Take 1 Cap by mouth daily. 90 Cap 3  
 glucose blood VI test strips (BLOOD GLUCOSE TEST) strip DX: E11.9 DM check fasting blood glucose once daily 200 Strip 2  
 hydroCHLOROthiazide (HYDRODIURIL) 25 mg tablet TAKE 1 TABLET EVERY DAY 90 Tab 1  
 ONE TOUCH DELICA 33 gauge misc USE ONE LANCET TO CHECK BLOOD SUGARS ONCE DAILY 100 Lancet 3  
 sucralfate (CARAFATE) 1 gram tablet  cetirizine (ZYRTEC) 10 mg tablet Take 1 Tab by mouth daily. 30 Tab 0  
 glucose blood VI test strips (ASCENSIA AUTODISC VI, ONE TOUCH ULTRA TEST VI) strip One Touch Ultra Use daily for blood glucose monitoring. DX: E11.9 3 Package 3  Blood-Glucose Meter (ONETOUCH ULTRAMINI) monitoring kit Use daily for glucose monitoring DX E11.9 1 Kit 0  
  fluticasone (FLONASE) 50 mcg/actuation nasal spray  omega-3 fatty acids-vitamin e (FISH OIL) 1,000 mg Cap Take 1 Cap by mouth daily.  multivitamin (ONE A DAY) tablet Take 1 Tab by mouth daily.  traMADol (ULTRAM) 50 mg tablet Take 1 Tab by mouth every eight (8) hours as needed for Pain. Max Daily Amount: 150 mg. 6 Tab 0 Allergies Allergen Reactions  Bystolic [Nebivolol] Other (comments) Leg cramps  Norvasc [Amlodipine] Swelling  Pravachol [Pravastatin] Myalgia Family History Problem Relation Age of Onset  Cancer Mother Social History Tobacco Use  Smoking status: Never Smoker  Smokeless tobacco: Never Used Substance Use Topics  Alcohol use: No  
  Comment: \"Often\" Patient Active Problem List  
Diagnosis Code  IFG (impaired fasting glucose) R73.01  
 Hyperlipidemia E78.5  GERD (gastroesophageal reflux disease) K21.9  Alpha thalassemia ([facility] [address] D56.0  Obesity E66.9  
 Carotid stenosis, left I65.22  
 Osteopenia M85.80  Anxiety F41.9  Essential hypertension I10  
 Advance directive discussed with patient-DARIUSZ Chu Z71.89  Chronic scapular pain-intermittent M89.8X1, [id number]  Severe obesity with body mass index (BMI) of 35.0 to 39.9 with serious comorbidity (HCC) E66.01  
 Advance care planning Z71.89  White coat syndrome with hypertension I10  
 Adenomatous polyp of colon D12.6 Depression Risk Factor Screening:  
 
3 most recent PHQ Screens 3/21/2019 Little interest or pleasure in doing things Not at all Feeling down, depressed, irritable, or hopeless Not at all Total Score PHQ 2 0 Alcohol Risk Factor Screening: You do not drink alcohol or very rarely. Functional Ability and Level of Safety:  
Hearing Loss Hearing is good. Activities of Daily Living The home contains: no safety equipment. Patient does total self care Fall Risk Fall Risk Assessment, last 12 mths 6/19/2018 Able to walk? Yes Fall in past 12 months? No  
Fall with injury? -  
Number of falls in past 12 months -  
 
 
Abuse Screen Patient is not abused Cognitive Screening Evaluation of Cognitive Function: 
Has your family/caregiver stated any concerns about your memory: no 
 
  
Patient Care Team  
Patient Care Team: 
Tavia Huang MD as PCP - General (Family Practice) Marilee Rocha MD (Surgery) Florina Mattson MD (Physical Medicine and Rehab) Lalit Mccoy MD as Surgeon (Surgical Oncology) Maddy Watson MD (Orthopedic Surgery) Alberto Chisholm DO (Optometry) Gali Osborn MD (Gastroenterology) Deny Krishnamurthy MD (Gastroenterology) Chip Cline MD (Gastroenterology) Assessment/Plan Education and counseling provided: 
Are appropriate based on today's review and evaluation End-of-Life planning (with patient's consent)-discussed, provided form Pneumococcal Vaccine- 13, 23 completed Influenza Vaccine- annually Screening Mammography- 11/18 Colorectal cancer screening tests- 2015 update 2025 Cardiovascular screening blood test- 9/18 Bone mass measurement (DEXA)- 2018 update 2020 Diagnoses and all orders for this visit: 
 
1. Medicare annual wellness visit, subsequent Health Maintenance Due Topic Date Due  Shingrix Vaccine Age 50> (1 of 2) 08/24/1993  Pneumococcal 65+ years (2 of 2 - PPSV23) 10/19/2016  MEDICARE YEARLY EXAM  03/20/2019  GLAUCOMA SCREENING Q2Y  05/16/2019 Rod Poster, 74 y.o.,  female SUBJECTIVE Routine ff-up HTN-  Taking  HCTZ. Home BP readings 120/80's. She reports being on multiple hypertensives in the past, and cut out BP meds due to hypotension/ and  white coat hypertension. HL/prediabetes- says intolerant to lipitor/pravachol pt reports muscle aches. does not want to try any other statin.   
 
 
GERD-on prilosec, requesting to switch to nexium, better response, aware of cost.  Will try. EGD 11/18 reviewed showing hiatal hernia, esophageal dilation, considering rpt in 3 years if with barrets on bx. ROS: 
See HPI, all others negative Patient Active Problem List  
Diagnosis Code  IFG (impaired fasting glucose) R73.01  
 Hyperlipidemia E78.5  GERD (gastroesophageal reflux disease) K21.9  Alpha thalassemia ([facility] [address] D56.0  Obesity E66.9  
 Carotid stenosis, left I65.22  
 Osteopenia M85.80  Anxiety F41.9  Hypertensive retinopathy H35.039  
 Essential hypertension I10  
 Advance directive discussed with patient-DARIUSZ Rosales Z71.89  Chronic scapular pain-intermittent M89.8X1, [id number]  Severe obesity (BMI 35.0-39.9) ([facility]) E66.01  
 
 
Current Outpatient Prescriptions Medication Sig Dispense Refill  lisinopril (PRINIVIL, ZESTRIL) 20 mg tablet Take 1 Tab by mouth daily. 90 Tab 0  
 hydroCHLOROthiazide (HYDRODIURIL) 25 mg tablet TAKE 1 TABLET EVERY DAY 90 Tab 1  
 traMADol (ULTRAM) 50 mg tablet Take 1 Tab by mouth every eight (8) hours as needed for Pain. Max Daily Amount: 150 mg. 6 Tab 0  
 omeprazole (PRILOSEC) 40 mg capsule  ONE TOUCH DELICA 33 gauge misc USE ONE LANCET TO CHECK BLOOD SUGARS ONCE DAILY 100 Lancet 3  
 sucralfate (CARAFATE) 1 gram tablet  naproxen (NAPROSYN) 500 mg tablet Take 1 Tab by mouth two (2) times daily (with meals). 30 Tab 0  
 cetirizine (ZYRTEC) 10 mg tablet Take 1 Tab by mouth daily. 30 Tab 0  
 glucose blood VI test strips (ASCENSIA AUTODISC VI, ONE TOUCH ULTRA TEST VI) strip One Touch Ultra Use daily for blood glucose monitoring. DX: E11.9 3 Package 3  Blood-Glucose Meter (ONETOUCH ULTRAMINI) monitoring kit Use daily for glucose monitoring DX E11.9 1 Kit 0  
 fluticasone (FLONASE) 50 mcg/actuation nasal spray  omega-3 fatty acids-vitamin e (FISH OIL) 1,000 mg Cap Take 1 Cap by mouth daily.  multivitamin (ONE A DAY) tablet Take 1 Tab by mouth daily.  glucose blood VI test strips (BLOOD GLUCOSE TEST) strip DX: E11.9 DM check fasting blood glucose once daily 200 Strip 2 Allergies Allergen Reactions  Bystolic [Nebivolol] Other (comments) Leg cramps  Norvasc [Amlodipine] Swelling  Pravachol [Pravastatin] Myalgia Past Medical History:  
Diagnosis Date  Alpha thalassemia ([facility] 2/15/2012  Anxiety  BMI 40.0-44.9, adult ([facility] 5/10/2017  Breast tumor   
 benign cyst  
 Carotid stenosis, left LICA <65%  Esophageal stricture   
 dr. Toi Wilson, s/p balloon dilation 9/2016  Gastric polyps  GERD (gastroesophageal reflux disease)   
 endoscopy 7/08- hiatal hernia/gastric polyps, esophagitis 9/2016 on prilosec/carafate  Headache(784.0)  Hypertension   
 nuclear stress test neg 1/2009  IFG (impaired fasting glucose)  Osteopenia Social History Social History  Marital status:  Spouse name: N/A  
 Number of children: N/A  
 Years of education: N/A Occupational History  Not on file. Social History Main Topics  Smoking status: Never Smoker  Smokeless tobacco: Never Used  Alcohol use No  
   Comment: \"Often\"  Drug use: No  
 Sexual activity: Yes  
  Partners: Male Other Topics Concern  Not on file Social History Narrative Family History Problem Relation Age of Onset  Cancer Mother OBJECTIVE Physical Exam:  
 
Visit Vitals  BP (!) 162/96 (BP 1 Location: Left arm, BP Patient Position: Sitting)  Pulse 69  Temp 97.9 °F (36.6 °C) (Oral)  Resp 16  
 Ht 4' 11\" (1.499 m)  Wt 192 lb 3.2 oz (87.2 kg)  SpO2 96%  BMI 38.82 kg/m2 General: alert, n no apparent distress or pain HEENT: throat and pharynx normal, eac patent, TM intact Breasts: breasts appear normal, no suspicious masses, no skin or nipple changes or axillary nodes.  
CVS: normal rate, regular rhythm, distinct S1 and S2 
 Lungs:clear to ausculation bilaterally, no crackles, wheezing or rhonchi noted Abdomen: soft, NT, ND Extremities: no edema Skin: warm, no lesions, rashes noted Psych:  mood and affect normal 
Results for orders placed or performed during the hospital encounter of 12/10/18 LIPID PANEL Result Value Ref Range LIPID PROFILE Cholesterol, total 219 (H) <200 MG/DL Triglyceride 126 <150 MG/DL  
 HDL Cholesterol 47 40 - 60 MG/DL  
 LDL, calculated 146.8 (H) 0 - 100 MG/DL VLDL, calculated 25.2 MG/DL  
 CHOL/HDL Ratio 4.7 0 - 5.0 METABOLIC PANEL, COMPREHENSIVE Result Value Ref Range Sodium 141 136 - 145 mmol/L Potassium 4.0 3.5 - 5.5 mmol/L Chloride 105 100 - 108 mmol/L  
 CO2 28 21 - 32 mmol/L Anion gap 8 3.0 - 18 mmol/L Glucose 91 74 - 99 mg/dL BUN 11 7.0 - 18 MG/DL Creatinine 0.77 0.6 - 1.3 MG/DL  
 BUN/Creatinine ratio 14 12 - 20 GFR est AA >60 >60 ml/min/1.73m2 GFR est non-AA >60 >60 ml/min/1.73m2 Calcium 8.6 8.5 - 10.1 MG/DL Bilirubin, total 0.4 0.2 - 1.0 MG/DL  
 ALT (SGPT) 22 13 - 56 U/L  
 AST (SGOT) 14 (L) 15 - 37 U/L Alk. phosphatase 85 45 - 117 U/L Protein, total 7.5 6.4 - 8.2 g/dL Albumin 3.6 3.4 - 5.0 g/dL Globulin 3.9 2.0 - 4.0 g/dL A-G Ratio 0.9 0.8 - 1.7 ASSESSMENT/PLAN Cara Espinoza was seen today for hypertension, cholesterol problem, other and annual wellness visit. Diagnoses and associated orders for this visit: 
White coat with Essential hypertension- 
Home BP monitor is consistent 
 cont HCTZ  
H/o hypotension with increase in BP meds 
monitoring BMP In 3 months Prediabetes-  
TLCs, monitoring Hyperlipidemia- 
 intolerant to lipitor/pravachol , calculated 18.8yr cv risk 12% foa ge  She does not want to try another statin, advised low fat  Diet, monitoring Osteopenia- 
 cont vit D/ Ca RDA Update dexa 4/2020 GERD- Fairly stable Switch to nexium 20 mg, with better response, tried on prilosec/protonix H/o esophageal dilation ,  hiatal hernia Avoid food triggers, smaller meals BMI 38 Discussed weight loss strategies, limit carbs rice/avoid apple juice Follow-up Disposition: 
Return in about 3 months or if symptoms worsen or fail to improve. Patient understands plan of care. Patient has provided input and agrees with goals.

## 2019-03-22 ENCOUNTER — TELEPHONE (OUTPATIENT)
Dept: FAMILY MEDICINE CLINIC | Age: 74
End: 2019-03-22

## 2019-03-22 NOTE — TELEPHONE ENCOUNTER
Fax received from [address] [place] [address] my meds stating prior Cleatrice Balling is required for the   Submit a PA request  1. Go to key. [email or web address] and click \"Enter a Key\"   2. Patient last name: Lazarus Yee      : 41      Henry: [phone] [facility]  3. Click \"start a PA\", complete the form, and \"send to plan\"     NEXIUM 20MG PACKETS REJECTED. IF CLINICALLY APPROPRIATE, CHANGE THE PRESCRIPTION.

## 2019-03-28 RX ORDER — ESOMEPRAZOLE MAGNESIUM 40 MG/1
40 CAPSULE, DELAYED RELEASE ORAL DAILY
Qty: 90 CAP | Refills: 3 | Status: SHIPPED | OUTPATIENT
Start: 2019-03-28 | End: 2019-12-18

## 2019-06-10 ENCOUNTER — HOSPITAL ENCOUNTER (OUTPATIENT)
Dept: LAB | Age: 74
Discharge: HOME | End: 2019-06-10
Payer: MEDICARE

## 2019-06-10 DIAGNOSIS — I10 ESSENTIAL HYPERTENSION: ICD-10-CM

## 2019-06-10 DIAGNOSIS — R73.01 IFG (IMPAIRED FASTING GLUCOSE): ICD-10-CM

## 2019-06-10 LAB
ANION GAP SERPL CALC-SCNC: 4 MMOL/L (ref 3–18)
BUN SERPL-MCNC: 13 MG/DL (ref 7–18)
BUN/CREAT SERPL: 14 (ref 12–20)
CALCIUM SERPL-MCNC: 8.9 MG/DL (ref 8.5–10.1)
CHLORIDE SERPL-SCNC: 106 MMOL/L (ref 100–108)
CO2 SERPL-SCNC: 30 MMOL/L (ref 21–32)
CREAT SERPL-MCNC: 0.9 MG/DL (ref 0.6–1.3)
GLUCOSE SERPL-MCNC: 101 MG/DL (ref 74–99)
POTASSIUM SERPL-SCNC: 4.2 MMOL/L (ref 3.5–5.5)
SODIUM SERPL-SCNC: 140 MMOL/L (ref 136–145)

## 2019-06-10 PROCEDURE — 80048 BASIC METABOLIC PNL TOTAL CA: CPT

## 2019-06-10 PROCEDURE — 36415 COLL VENOUS BLD VENIPUNCTURE: CPT

## 2019-06-20 ENCOUNTER — OFFICE VISIT (OUTPATIENT)
Dept: FAMILY MEDICINE CLINIC | Age: 74
End: 2019-06-20

## 2019-06-20 VITALS
TEMPERATURE: 98.4 F | RESPIRATION RATE: 16 BRPM | HEART RATE: 68 BPM | BODY MASS INDEX: 38.51 KG/M2 | OXYGEN SATURATION: 98 % | DIASTOLIC BLOOD PRESSURE: 90 MMHG | HEIGHT: 59 IN | SYSTOLIC BLOOD PRESSURE: 178 MMHG | WEIGHT: 191 LBS

## 2019-06-20 DIAGNOSIS — I10 WHITE COAT SYNDROME WITH HYPERTENSION: ICD-10-CM

## 2019-06-20 DIAGNOSIS — I10 ESSENTIAL HYPERTENSION: Primary | ICD-10-CM

## 2019-06-20 DIAGNOSIS — E78.00 PURE HYPERCHOLESTEROLEMIA: ICD-10-CM

## 2019-06-20 DIAGNOSIS — R73.01 IFG (IMPAIRED FASTING GLUCOSE): ICD-10-CM

## 2019-06-20 DIAGNOSIS — M85.80 OSTEOPENIA, UNSPECIFIED LOCATION: ICD-10-CM

## 2019-06-20 DIAGNOSIS — K21.9 GASTROESOPHAGEAL REFLUX DISEASE, ESOPHAGITIS PRESENCE NOT SPECIFIED: ICD-10-CM

## 2019-06-20 RX ORDER — OMEPRAZOLE 40 MG/1
40 CAPSULE, DELAYED RELEASE ORAL DAILY
COMMUNITY
End: 2019-12-18 | Stop reason: SDUPTHER

## 2019-06-20 NOTE — PATIENT INSTRUCTIONS

## 2019-06-20 NOTE — PROGRESS NOTES
Chief Complaint   Patient presents with    Hypertension     3 month routine follow up    Diabetes     1. Have you been to the ER, urgent care clinic or hospitalized since your last visit? NO.     2. Have you seen or consulted any other health care providers outside of the [address] [place] [address] since your last visit (Include any pap smears or colon screening)? Rosa Rascon/ Dr. Mindy Medrano    Do you have an Advanced Directive? NO    Would you like information on Advanced Directives?  NO

## 2019-06-20 NOTE — PROGRESS NOTES
Pito Campbell, 74 y.o.,  female    SUBJECTIVE  Routine ff-up    HTN-  Taking  HCTZ. Home BP readings 120/80's. She reports being on multiple hypertensives in the past, and cut out BP meds due to hypotension/ and  white coat hypertension. We have compared BP monitors in the past, I continue to recommend she brings BP monitor to compare with ours on every visit. HL/prediabetes- says intolerant to lipitor/pravachol pt reports muscle aches. does not want to try any other statin. GERD-better response on nexium however too costly, now back to prilosec. Recommended bulk stores as option,  EGD 11/18 reviewed showing hiatal hernia, esophageal dilation. GI following. ROS:  See HPI, all others negative        Patient Active Problem List   Diagnosis Code    IFG (impaired fasting glucose) R73.01    Hyperlipidemia E78.5    GERD (gastroesophageal reflux disease) K21.9    Alpha thalassemia ([facility]) D56.0    Obesity E66.9    Carotid stenosis, left I65.22    Osteopenia M85.80    Anxiety F41.9    Hypertensive retinopathy H35.039    Essential hypertension I10    Advance directive discussed with patient-DARIUSZ Kendall Z71.89    Chronic scapular pain-intermittent M89.8X1, G89.29    Severe obesity (BMI 35.0-39.9) ([facility]) E66.01       Current Outpatient Prescriptions   Medication Sig Dispense Refill    lisinopril (PRINIVIL, ZESTRIL) 20 mg tablet Take 1 Tab by mouth daily. 90 Tab 0    hydroCHLOROthiazide (HYDRODIURIL) 25 mg tablet TAKE 1 TABLET EVERY DAY 90 Tab 1    traMADol (ULTRAM) 50 mg tablet Take 1 Tab by mouth every eight (8) hours as needed for Pain. Max Daily Amount: 150 mg. 6 Tab 0    omeprazole (PRILOSEC) 40 mg capsule       ONE TOUCH DELICA 33 gauge misc USE ONE LANCET TO CHECK BLOOD SUGARS ONCE DAILY 100 Lancet 3    sucralfate (CARAFATE) 1 gram tablet       naproxen (NAPROSYN) 500 mg tablet Take 1 Tab by mouth two (2) times daily (with meals).  30 Tab 0    cetirizine (ZYRTEC) 10 mg tablet Take 1 Tab by mouth daily. 30 Tab 0    glucose blood VI test strips (ASCENSIA AUTODISC VI, ONE TOUCH ULTRA TEST VI) strip One Touch Ultra Use daily for blood glucose monitoring. DX: E11.9 3 Package 3    Blood-Glucose Meter (ONETOUCH ULTRAMINI) monitoring kit Use daily for glucose monitoring DX E11.9 1 Kit 0    fluticasone (FLONASE) 50 mcg/actuation nasal spray       omega-3 fatty acids-vitamin e (FISH OIL) 1,000 mg Cap Take 1 Cap by mouth daily.  multivitamin (ONE A DAY) tablet Take 1 Tab by mouth daily.  glucose blood VI test strips (BLOOD GLUCOSE TEST) strip DX: E11.9 DM check fasting blood glucose once daily 200 Strip 2       Allergies   Allergen Reactions    Bystolic [Nebivolol] Other (comments)     Leg cramps    Norvasc [Amlodipine] Swelling    Pravachol [Pravastatin] Myalgia       Past Medical History:   Diagnosis Date    Alpha thalassemia ([facility] 2/15/2012    Anxiety     [facility] 40.0-44.9[facility] 5/10/2017    Breast tumor     benign cyst    Carotid stenosis, left     LICA <58%    Esophageal stricture     dr. Wei Casillas, s/p balloon dilation 9/2016    Gastric polyps     GERD (gastroesophageal reflux disease)     endoscopy 7/08- hiatal hernia/gastric polyps, esophagitis 9/2016 on prilosec/carafate    Headache(784.0)     Hypertension     nuclear stress test neg 1/2009    IFG (impaired fasting glucose)     Osteopenia        Social History     Social History    Marital status:      Spouse name: N/A    Number of children: N/A    Years of education: N/A     Occupational History    Not on file.      Social History Main Topics    Smoking status: Never Smoker    Smokeless tobacco: Never Used    Alcohol use No      Comment: \"Often\"    Drug use: No    Sexual activity: Yes     Partners: Male     Other Topics Concern    Not on file     Social History Narrative       Family History   Problem Relation Age of Onset    Cancer Mother          OBJECTIVE    Physical Exam:     Visit Vitals  BP (!) 162/96 (BP 1 Location: Left arm, BP Patient Position: Sitting)    Pulse 69    Temp 97.9 °F (36.6 °C) (Oral)    Resp 16    Ht 4' 11\" (1.499 m)    Wt 192 lb 3.2 oz (87.2 kg)    SpO2 96%    BMI 38.82 kg/m2       General: alert, n no apparent distress or pain  HEENT: throat and pharynx normal, eac patent, TM intact  CVS: normal rate, regular rhythm, distinct S1 and S2  Lungs:clear to ausculation bilaterally, no crackles, wheezing or rhonchi noted  Abdomen: soft, NT, ND  Extremities: no edema  Skin: warm, no lesions, rashes noted  Psych:  mood and affect normal    ASSESSMENT/PLAN  Bertha was seen today for hypertension, cholesterol problem, other and annual wellness visit. Diagnoses and associated orders for this visit:  White coat with Essential hypertension-  Home BP monitor is consistent, to bring BP monitor on every visit to compare   cont HCTZ   H/o hypotension with increase in BP meds  monitoring  Cmp/a1c  In 3 months    Prediabetes-   TLCs, monitoring  A1c/ cmp prior to next visit    Hyperlipidemia-   intolerant to lipitor/pravachol , calculated 18.8yr cv risk 12% foa ge  She does not want to try another statin, advised low fat  Diet, monitoring  Consider zetia, she is still hesitant    Osteopenia-   cont vit D/ Ca RDA  Update dexa 4/2020    GERD-   Fairly stable  Back on prilosec due to cost of nexium, recommend trial bulk stores for better cost  H/o esophageal dilation ,  hiatal hernia  Avoid food triggers, smaller meals  GI following    BMI 38  Discussed weight loss strategies, limit carbs rice/avoid apple juice      Follow-up Disposition:  Return in about 3 months or if symptoms worsen or fail to improve. Patient understands plan of care. Patient has provided input and agrees with goals.

## 2019-09-11 ENCOUNTER — HOSPITAL ENCOUNTER (OUTPATIENT)
Dept: LAB | Age: 74
Discharge: HOME | End: 2019-09-11
Payer: MEDICARE

## 2019-09-11 DIAGNOSIS — E78.00 PURE HYPERCHOLESTEROLEMIA: ICD-10-CM

## 2019-09-11 DIAGNOSIS — I10 ESSENTIAL HYPERTENSION: ICD-10-CM

## 2019-09-11 DIAGNOSIS — R73.01 IFG (IMPAIRED FASTING GLUCOSE): ICD-10-CM

## 2019-09-11 LAB
ALBUMIN SERPL-MCNC: 3.6 G/DL (ref 3.4–5)
ALBUMIN/GLOB SERPL: 0.9 {RATIO} (ref 0.8–1.7)
ALP SERPL-CCNC: 102 U/L (ref 45–117)
ALT SERPL-CCNC: 24 U/L (ref 13–56)
ANION GAP SERPL CALC-SCNC: 6 MMOL/L (ref 3–18)
AST SERPL-CCNC: 19 U/L (ref 10–38)
BILIRUB SERPL-MCNC: 0.5 MG/DL (ref 0.2–1)
BUN SERPL-MCNC: 14 MG/DL (ref 7–18)
BUN/CREAT SERPL: 17 (ref 12–20)
CALCIUM SERPL-MCNC: 9 MG/DL (ref 8.5–10.1)
CHLORIDE SERPL-SCNC: 105 MMOL/L (ref 100–111)
CO2 SERPL-SCNC: 31 MMOL/L (ref 21–32)
CREAT SERPL-MCNC: 0.81 MG/DL (ref 0.6–1.3)
GLOBULIN SER CALC-MCNC: 4.1 G/DL (ref 2–4)
GLUCOSE SERPL-MCNC: 101 MG/DL (ref 74–99)
HBA1C MFR BLD: 5.7 % (ref 4.2–5.6)
POTASSIUM SERPL-SCNC: 4.1 MMOL/L (ref 3.5–5.5)
PROT SERPL-MCNC: 7.7 G/DL (ref 6.4–8.2)
SODIUM SERPL-SCNC: 142 MMOL/L (ref 136–145)

## 2019-09-11 PROCEDURE — 83036 HEMOGLOBIN GLYCOSYLATED A1C: CPT

## 2019-09-11 PROCEDURE — 36415 COLL VENOUS BLD VENIPUNCTURE: CPT

## 2019-09-11 PROCEDURE — 80053 COMPREHEN METABOLIC PANEL: CPT

## 2019-09-20 ENCOUNTER — OFFICE VISIT (OUTPATIENT)
Dept: FAMILY MEDICINE CLINIC | Age: 74
End: 2019-09-20

## 2019-09-20 VITALS
TEMPERATURE: 98.2 F | BODY MASS INDEX: 38.3 KG/M2 | HEART RATE: 66 BPM | SYSTOLIC BLOOD PRESSURE: 130 MMHG | HEIGHT: 59 IN | DIASTOLIC BLOOD PRESSURE: 84 MMHG | WEIGHT: 190 LBS | RESPIRATION RATE: 16 BRPM | OXYGEN SATURATION: 98 %

## 2019-09-20 DIAGNOSIS — K21.9 GASTROESOPHAGEAL REFLUX DISEASE, ESOPHAGITIS PRESENCE NOT SPECIFIED: ICD-10-CM

## 2019-09-20 DIAGNOSIS — E78.00 PURE HYPERCHOLESTEROLEMIA: ICD-10-CM

## 2019-09-20 DIAGNOSIS — M85.80 OSTEOPENIA, UNSPECIFIED LOCATION: ICD-10-CM

## 2019-09-20 DIAGNOSIS — I10 ESSENTIAL HYPERTENSION: ICD-10-CM

## 2019-09-20 DIAGNOSIS — R73.01 IFG (IMPAIRED FASTING GLUCOSE): ICD-10-CM

## 2019-09-20 DIAGNOSIS — I10 WHITE COAT SYNDROME WITH HYPERTENSION: Primary | ICD-10-CM

## 2019-09-20 RX ORDER — HYDROCHLOROTHIAZIDE 25 MG/1
TABLET ORAL
Qty: 90 TAB | Refills: 1 | Status: SHIPPED | OUTPATIENT
Start: 2019-09-20 | End: 2019-12-18 | Stop reason: SDUPTHER

## 2019-09-20 NOTE — PATIENT INSTRUCTIONS
DASH Diet: Care Instructions  Your Care Instructions    The DASH diet is an eating plan that can help lower your blood pressure. DASH stands for Dietary Approaches to Stop Hypertension. Hypertension is high blood pressure. The DASH diet focuses on eating foods that are high in calcium, potassium, and magnesium. These nutrients can lower blood pressure. The foods that are highest in these nutrients are fruits, vegetables, low-fat dairy products, nuts, seeds, and legumes. But taking calcium, potassium, and magnesium supplements instead of eating foods that are high in those nutrients does not have the same effect. The DASH diet also includes whole grains, fish, and poultry. The DASH diet is one of several lifestyle changes your doctor may recommend to lower your high blood pressure. Your doctor may also want you to decrease the amount of sodium in your diet. Lowering sodium while following the DASH diet can lower blood pressure even further than just the DASH diet alone. Follow-up care is a key part of your treatment and safety. Be sure to make and go to all appointments, and call your doctor if you are having problems. It's also a good idea to know your test results and keep a list of the medicines you take. How can you care for yourself at home? Following the DASH diet  · Eat 4 to 5 servings of fruit each day. A serving is 1 medium-sized piece of fruit, ½ cup chopped or canned fruit, 1/4 cup dried fruit, or 4 ounces (½ cup) of fruit juice. Choose fruit more often than fruit juice. · Eat 4 to 5 servings of vegetables each day. A serving is 1 cup of lettuce or raw leafy vegetables, ½ cup of chopped or cooked vegetables, or 4 ounces (½ cup) of vegetable juice. Choose vegetables more often than vegetable juice. · Get 2 to 3 servings of low-fat and fat-free dairy each day. A serving is 8 ounces of milk, 1 cup of yogurt, or 1 ½ ounces of cheese. · Eat 6 to 8 servings of grains each day.  A serving is 1 slice of bread, 1 ounce of dry cereal, or ½ cup of cooked rice, pasta, or cooked cereal. Try to choose whole-grain products as much as possible. · Limit lean meat, poultry, and fish to 2 servings each day. A serving is 3 ounces, about the size of a deck of cards. · Eat 4 to 5 servings of nuts, seeds, and legumes (cooked dried beans, lentils, and split peas) each week. A serving is 1/3 cup of nuts, 2 tablespoons of seeds, or ½ cup of cooked beans or peas. · Limit fats and oils to 2 to 3 servings each day. A serving is 1 teaspoon of vegetable oil or 2 tablespoons of salad dressing. · Limit sweets and added sugars to 5 servings or less a week. A serving is 1 tablespoon jelly or jam, ½ cup sorbet, or 1 cup of lemonade. · Eat less than 2,300 milligrams (mg) of sodium a day. If you limit your sodium to 1,500 mg a day, you can lower your blood pressure even more. Tips for success  · Start small. Do not try to make dramatic changes to your diet all at once. You might feel that you are missing out on your favorite foods and then be more likely to not follow the plan. Make small changes, and stick with them. Once those changes become habit, add a few more changes. · Try some of the following:  ? Make it a goal to eat a fruit or vegetable at every meal and at snacks. This will make it easy to get the recommended amount of fruits and vegetables each day. ? Try yogurt topped with fruit and nuts for a snack or healthy dessert. ? Add lettuce, tomato, cucumber, and onion to sandwiches. ? Combine a ready-made pizza crust with low-fat mozzarella cheese and lots of vegetable toppings. Try using tomatoes, squash, spinach, broccoli, carrots, cauliflower, and onions. ? Have a variety of cut-up vegetables with a low-fat dip as an appetizer instead of chips and dip. ? Sprinkle sunflower seeds or chopped almonds over salads. Or try adding chopped walnuts or almonds to cooked vegetables.   ? Try some vegetarian meals using beans and peas. Add garbanzo or kidney beans to salads. Make burritos and tacos with mashed elizabeth beans or black beans. Where can you learn more? Go to http://michael-allyssa.info/. Enter C709 in the search box to learn more about \"DASH Diet: Care Instructions. \"  Current as of: July 22, 2018  Content Version: 12.1  © 9430-2207 [employer], [employer]. Care instructions adapted under license by [employer] (which disclaims liability or warranty for this information). If you have questions about a medical condition or this instruction, always ask your healthcare professional. [address] any warranty or liability for your use of this information.

## 2019-09-20 NOTE — PROGRESS NOTES
1. Have you been to the ER, urgent care clinic since your last visit? Hospitalized since your last visit? No    2. Have you seen or consulted any other health care providers outside of the [address] [place] [address] since your last visit? Include any pap smears or colon screening.  No

## 2019-09-20 NOTE — PROGRESS NOTES
Anais Beebe, 74 y.o.,  female    SUBJECTIVE  Routine ff-up    HTN with white coat-  Taking  HCTZ. Home BP readings 120/80's. She reports being on multiple hypertensives in the past, and cut out BP meds due to hypotension/ and  white coat hypertension. We have compared BP monitors in the past and consistent. HL/prediabetes- says intolerant to lipitor/pravachol pt reports muscle aches. does not want to try any other statin. GERD-better response on nexium however too costly, now back to prilosec. Recommended bulk stores as option,  EGD 11/18 reviewed showing hiatal hernia, esophageal dilation. GI following. Due for CRCS update 5/2020    ROS:  See HPI, all others negative        Patient Active Problem List   Diagnosis Code    IFG (impaired fasting glucose) R73.01    Hyperlipidemia E78.5    GERD (gastroesophageal reflux disease) K21.9    Alpha thalassemia ([facility]) D56.0    Obesity E66.9    Carotid stenosis, left I65.22    Osteopenia M85.80    Anxiety F41.9    Hypertensive retinopathy H35.039    Essential hypertension I10    Advance directive discussed with patient-POA Mozella Spatz Z71.89    Chronic scapular pain-intermittent M89.8X1, G89.29    Severe obesity (BMI 35.0-39.9) ([facility]) E66.01       Current Outpatient Prescriptions   Medication Sig Dispense Refill    lisinopril (PRINIVIL, ZESTRIL) 20 mg tablet Take 1 Tab by mouth daily. 90 Tab 0    hydroCHLOROthiazide (HYDRODIURIL) 25 mg tablet TAKE 1 TABLET EVERY DAY 90 Tab 1    traMADol (ULTRAM) 50 mg tablet Take 1 Tab by mouth every eight (8) hours as needed for Pain. Max Daily Amount: 150 mg. 6 Tab 0    omeprazole (PRILOSEC) 40 mg capsule       ONE TOUCH DELICA 33 gauge misc USE ONE LANCET TO CHECK BLOOD SUGARS ONCE DAILY 100 Lancet 3    sucralfate (CARAFATE) 1 gram tablet       naproxen (NAPROSYN) 500 mg tablet Take 1 Tab by mouth two (2) times daily (with meals). 30 Tab 0    cetirizine (ZYRTEC) 10 mg tablet Take 1 Tab by mouth daily. 30 Tab 0    glucose blood VI test strips (ASCENSIA AUTODISC VI, ONE TOUCH ULTRA TEST VI) strip One Touch Ultra Use daily for blood glucose monitoring. DX: E11.9 3 Package 3    Blood-Glucose Meter (ONETOUCH ULTRAMINI) monitoring kit Use daily for glucose monitoring DX E11.9 1 Kit 0    fluticasone (FLONASE) 50 mcg/actuation nasal spray       omega-3 fatty acids-vitamin e (FISH OIL) 1,000 mg Cap Take 1 Cap by mouth daily.  multivitamin (ONE A DAY) tablet Take 1 Tab by mouth daily.  glucose blood VI test strips (BLOOD GLUCOSE TEST) strip DX: E11.9 DM check fasting blood glucose once daily 200 Strip 2       Allergies   Allergen Reactions    Bystolic [Nebivolol] Other (comments)     Leg cramps    Norvasc [Amlodipine] Swelling    Pravachol [Pravastatin] Myalgia       Past Medical History:   Diagnosis Date    [facility]/15/2012    Anxiety     BMI 40.0-44.9, adult ([facility] 5/10/2017    Breast tumor     benign cyst    Carotid stenosis, left     LICA <60%    Esophageal stricture     dr. Alec Rivero, s/p balloon dilation 9/2016    Gastric polyps     GERD (gastroesophageal reflux disease)     endoscopy 7/08- hiatal hernia/gastric polyps, esophagitis 9/2016 on prilosec/carafate    Headache(784.0)     Hypertension     nuclear stress test neg 1/2009    IFG (impaired fasting glucose)     Osteopenia        Social History     Social History    Marital status:      Spouse name: N/A    Number of children: N/A    Years of education: N/A     Occupational History    Not on file.      Social History Main Topics    Smoking status: Never Smoker    Smokeless tobacco: Never Used    Alcohol use No      Comment: \"Often\"    Drug use: No    Sexual activity: Yes     Partners: Male     Other Topics Concern    Not on file     Social History Narrative       Family History   Problem Relation Age of Onset    Cancer Mother          OBJECTIVE    Physical Exam:     Visit Vitals  /84 (BP 1 Location: Left arm, BP Patient Position: Sitting)   Pulse 66   Temp 98.2 °F (36.8 °C) (Oral)   Resp 16   Ht 4' 11\" (1.499 m)   Wt 190 lb (86.2 kg)   SpO2 98%   BMI 38.38 kg/m²         General: alert, n no apparent distress or pain  HEENT: throat and pharynx normal, eac patent, TM intact  CVS: normal rate, regular rhythm, distinct S1 and S2  Lungs:clear to ausculation bilaterally, no crackles, wheezing or rhonchi noted  Abdomen: soft, NT, ND  Extremities: no edema  Skin: warm, no lesions, rashes noted  Psych:  mood and affect normal  Results for orders placed or performed during the hospital encounter of 14/40/34   METABOLIC PANEL, COMPREHENSIVE   Result Value Ref Range    Sodium 142 136 - 145 mmol/L    Potassium 4.1 3.5 - 5.5 mmol/L    Chloride 105 100 - 111 mmol/L    CO2 31 21 - 32 mmol/L    Anion gap 6 3.0 - 18 mmol/L    Glucose 101 (H) 74 - 99 mg/dL    BUN 14 7.0 - 18 MG/DL    Creatinine 0.81 0.6 - 1.3 MG/DL    BUN/Creatinine ratio 17 12 - 20      GFR est AA >60 >60 ml/min/1.73m2    GFR est non-AA >60 >60 ml/min/1.73m2    Calcium 9.0 8.5 - 10.1 MG/DL    Bilirubin, total 0.5 0.2 - 1.0 MG/DL    ALT (SGPT) 24 13 - 56 U/L    AST (SGOT) 19 10 - 38 U/L    Alk. phosphatase 102 45 - 117 U/L    Protein, total 7.7 6.4 - 8.2 g/dL    Albumin 3.6 3.4 - 5.0 g/dL    Globulin 4.1 (H) 2.0 - 4.0 g/dL    A-G Ratio 0.9 0.8 - 1.7     HEMOGLOBIN A1C W/O EAG   Result Value Ref Range    Hemoglobin A1c 5.7 (H) 4.2 - 5.6 %       ASSESSMENT/PLAN  Bertha was seen today for hypertension, cholesterol problem, other and annual wellness visit.     Diagnoses and associated orders for this visit:  White coat with Essential hypertension-  controlled   cont HCTZ   H/o hypotension with increase in BP meds  monitoring    Prediabetes-   TLCs, monitoring    Hyperlipidemia-   intolerant to lipitor/pravachol , calculated 18.8yr cv risk 12% foa ge  She does not want to try another statin, advised low fat  Diet, monitoring  Consider zetia, she is still hesitant    Osteopenia-   cont vit D/ Ca RDA  Update dexa 4/2020    GERD-   Fairly stable  Back on prilosec due to cost of nexium, recommend trial bulk stores for better cost  H/o esophageal dilation ,  hiatal hernia  Avoid food triggers, smaller meals  GI following    BMI 38  Discussed weight loss strategies, limit carbs rice/avoid apple juice    Declines flu vaccine    Follow-up Disposition:  Return in about 3 months or if symptoms worsen or fail to improve. Patient understands plan of care. Patient has provided input and agrees with goals.

## 2019-11-01 ENCOUNTER — HOSPITAL ENCOUNTER (OUTPATIENT)
Dept: MAMMOGRAPHY | Age: 74
Discharge: HOME | End: 2019-11-01
Attending: FAMILY MEDICINE
Payer: MEDICARE

## 2019-11-01 DIAGNOSIS — Z12.31 SCREENING MAMMOGRAM FOR HIGH-RISK PATIENT: ICD-10-CM

## 2019-11-01 PROCEDURE — 77063 BREAST TOMOSYNTHESIS BI: CPT

## 2019-12-18 ENCOUNTER — OFFICE VISIT (OUTPATIENT)
Dept: FAMILY MEDICINE CLINIC | Age: 74
End: 2019-12-18

## 2019-12-18 VITALS
OXYGEN SATURATION: 96 % | HEIGHT: 59 IN | BODY MASS INDEX: 38.02 KG/M2 | TEMPERATURE: 98.1 F | DIASTOLIC BLOOD PRESSURE: 86 MMHG | HEART RATE: 70 BPM | WEIGHT: 188.6 LBS | SYSTOLIC BLOOD PRESSURE: 134 MMHG | RESPIRATION RATE: 16 BRPM

## 2019-12-18 DIAGNOSIS — K21.9 GASTROESOPHAGEAL REFLUX DISEASE, ESOPHAGITIS PRESENCE NOT SPECIFIED: ICD-10-CM

## 2019-12-18 DIAGNOSIS — M85.80 OSTEOPENIA, UNSPECIFIED LOCATION: ICD-10-CM

## 2019-12-18 DIAGNOSIS — R73.01 IFG (IMPAIRED FASTING GLUCOSE): ICD-10-CM

## 2019-12-18 DIAGNOSIS — E78.00 PURE HYPERCHOLESTEROLEMIA: Primary | ICD-10-CM

## 2019-12-18 DIAGNOSIS — I10 ESSENTIAL HYPERTENSION: ICD-10-CM

## 2019-12-18 RX ORDER — HYDROCHLOROTHIAZIDE 25 MG/1
TABLET ORAL
Qty: 90 TAB | Refills: 1 | Status: SHIPPED | OUTPATIENT
Start: 2019-12-18 | End: 2020-10-05 | Stop reason: SDUPTHER

## 2019-12-18 RX ORDER — OMEPRAZOLE 40 MG/1
40 CAPSULE, DELAYED RELEASE ORAL DAILY
Qty: 90 CAP | Refills: 3 | Status: SHIPPED | OUTPATIENT
Start: 2019-12-18 | End: 2020-10-05 | Stop reason: SDUPTHER

## 2019-12-18 RX ORDER — SUCRALFATE 1 G/1
TABLET ORAL
Status: CANCELLED | OUTPATIENT
Start: 2019-12-18

## 2019-12-18 NOTE — PROGRESS NOTES
Samantha Roger, 74 y.o.,  female    SUBJECTIVE  Routine ff-up    HTN with white coat-  Taking  HCTZ. Home BP readings 120/80's. She reports being on multiple hypertensives in the past, and cut out BP meds due to hypotension/ and  white coat hypertension. We have compared BP monitors in the past and consistent. HL/prediabetes- says intolerant to lipitor/pravachol pt reports muscle aches. does not want to try any other statin. Previous MRI 2014 showing minimal presumed chronic microvascular ischemic changes. GERD-currently on prilosec, reports better response on nexium however too costly. Recommended bulk stores as option,  EGD 11/18 reviewed showing hiatal hernia, esophageal dilation. GI following. Due for CRCS update 5/2020    ROS:  See HPI, all others negative        Patient Active Problem List   Diagnosis Code    IFG (impaired fasting glucose) R73.01    Hyperlipidemia E78.5    GERD (gastroesophageal reflux disease) K21.9    Alpha thalassemia ([facility]) D56.0    Obesity E66.9    Carotid stenosis, left I65.22    Osteopenia M85.80    Anxiety F41.9    Hypertensive retinopathy H35.039    Essential hypertension I10    Advance directive discussed with patient-POA Benito Beat Z71.89    Chronic scapular pain-intermittent M89.8X1, G89.29    Severe obesity (BMI 35.0-39.9) ([facility]) E66.01       Current Outpatient Medications:     omeprazole (PRILOSEC) 40 mg capsule, Take 1 Cap by mouth daily. , Disp: 90 Cap, Rfl: 3    hydroCHLOROthiazide (HYDRODIURIL) 25 mg tablet, TAKE 1 TABLET EVERY DAY, Disp: 90 Tab, Rfl: 1    OTHER, OTC ear drop for pain, Disp: , Rfl:     glucose blood VI test strips (BLOOD GLUCOSE TEST) strip, DX: E11.9 DM check fasting blood glucose once daily, Disp: 200 Strip, Rfl: 2    ONE TOUCH DELICA 33 gauge misc, USE ONE LANCET TO CHECK BLOOD SUGARS ONCE DAILY, Disp: 100 Lancet, Rfl: 3    sucralfate (CARAFATE) 1 gram tablet, , Disp: , Rfl:     cetirizine (ZYRTEC) 10 mg tablet, Take 1 Tab by mouth daily. , Disp: 30 Tab, Rfl: 0    glucose blood VI test strips (ASCENSIA AUTODISC VI, ONE TOUCH ULTRA TEST VI) strip, One Touch Ultra Use daily for blood glucose monitoring. DX: E11.9, Disp: 3 Package, Rfl: 3    Blood-Glucose Meter (ONETOUCH ULTRAMINI) monitoring kit, Use daily for glucose monitoring DX E11.9, Disp: 1 Kit, Rfl: 0    fluticasone (FLONASE) 50 mcg/actuation nasal spray, , Disp: , Rfl:     omega-3 fatty acids-vitamin e (FISH OIL) 1,000 mg Cap, Take 1 Cap by mouth daily. , Disp: , Rfl:     multivitamin (ONE A DAY) tablet, Take 1 Tab by mouth daily. , Disp: , Rfl:     traMADol (ULTRAM) 50 mg tablet, Take 1 Tab by mouth every eight (8) hours as needed for Pain. Max Daily Amount: 150 mg., Disp: 6 Tab, Rfl: 0    Allergies   Allergen Reactions    Bystolic [Nebivolol] Other (comments)     Leg cramps    Norvasc [Amlodipine] Swelling    Pravachol [Pravastatin] Myalgia       Past Medical History:   Diagnosis Date    Alpha thalassemia ([facility] 2/15/2012    Anxiety     BMI 40.0-44.9, adult ([facility] 5/10/2017    Breast tumor     benign cyst    Carotid stenosis, left     LICA <81%    Esophageal stricture     dr. Alem Ramírez, s/p balloon dilation 9/2016    Gastric polyps     GERD (gastroesophageal reflux disease)     endoscopy 7/08- hiatal hernia/gastric polyps, esophagitis 9/2016 on prilosec/carafate    Headache(784.0)     Hypertension     nuclear stress test neg 1/2009    IFG (impaired fasting glucose)     Osteopenia        Social History     Social History    Marital status:      Spouse name: N/A    Number of children: N/A    Years of education: N/A     Occupational History    Not on file.      Social History Main Topics    Smoking status: Never Smoker    Smokeless tobacco: Never Used    Alcohol use No      Comment: \"Often\"    Drug use: No    Sexual activity: Yes     Partners: Male     Other Topics Concern    Not on file     Social History Narrative       Family History   Problem Relation Age of Onset    Cancer Mother          OBJECTIVE    Physical Exam:     Visit Vitals  /86 (BP 1 Location: Left arm, BP Patient Position: Sitting)   Pulse 70   Temp 98.1 °F (36.7 °C) (Oral)   Resp 16   Ht 4' 11\" (1.499 m)   Wt 188 lb 9.6 oz (85.5 kg)   SpO2 96%   BMI 38.09 kg/m²         General: alert, n no apparent distress or pain  HEENT: throat and pharynx normal, eac patent, TM intact  CVS: normal rate, regular rhythm, distinct S1 and S2  Lungs:clear to ausculation bilaterally, no crackles, wheezing or rhonchi noted  Abdomen: soft, NT, ND  Extremities: no edema  Skin: warm, no lesions, rashes noted  Psych:  mood and affect normal  Results for orders placed or performed during the hospital encounter of 91/87/14   METABOLIC PANEL, COMPREHENSIVE   Result Value Ref Range    Sodium 142 136 - 145 mmol/L    Potassium 4.1 3.5 - 5.5 mmol/L    Chloride 105 100 - 111 mmol/L    CO2 31 21 - 32 mmol/L    Anion gap 6 3.0 - 18 mmol/L    Glucose 101 (H) 74 - 99 mg/dL    BUN 14 7.0 - 18 MG/DL    Creatinine 0.81 0.6 - 1.3 MG/DL    BUN/Creatinine ratio 17 12 - 20      GFR est AA >60 >60 ml/min/1.73m2    GFR est non-AA >60 >60 ml/min/1.73m2    Calcium 9.0 8.5 - 10.1 MG/DL    Bilirubin, total 0.5 0.2 - 1.0 MG/DL    ALT (SGPT) 24 13 - 56 U/L    AST (SGOT) 19 10 - 38 U/L    Alk. phosphatase 102 45 - 117 U/L    Protein, total 7.7 6.4 - 8.2 g/dL    Albumin 3.6 3.4 - 5.0 g/dL    Globulin 4.1 (H) 2.0 - 4.0 g/dL    A-G Ratio 0.9 0.8 - 1.7     HEMOGLOBIN A1C W/O EAG   Result Value Ref Range    Hemoglobin A1c 5.7 (H) 4.2 - 5.6 %       ASSESSMENT/PLAN  Bertha was seen today for hypertension, cholesterol problem, other and annual wellness visit.     Diagnoses and associated orders for this visit:    Essential hypertension-  controlled   cont HCTZ   H/o hypotension with increase in BP meds  monitoring    Prediabetes-   TLCs, monitoring    Hyperlipidemia-   intolerant to lipitor/pravachol , calculated 18.8yr cv risk 12% [place]  She does not want to try another statin, advised low fat  Diet, monitoring  Consider zetia, she is still hesitant  Recheck cmp, lipid panel,a1c, cbc in 6 months prior to next vsiit    Osteopenia-   cont vit D/ Ca RDA  Update dexa 4/2020    GERD-   Fairly stable  Back on prilosec due to cost of nexium, recommend trial bulk stores for better cost  H/o esophageal dilation ,  hiatal hernia  Avoid food triggers, smaller meals  GI following    BMI 38  Discussed weight loss strategies, limit carbs rice/avoid apple juice    Declines flu/PCV  vaccines    Follow-up Disposition:  Return in about 6 months or if symptoms worsen or fail to improve. Plan on [address] then     Patient understands plan of care. Patient has provided input and agrees with goals.

## 2019-12-18 NOTE — PATIENT INSTRUCTIONS
Dieta DASH: Instrucciones de cuidado - [ DASH Diet: Care Instructions ] Instrucciones de cuidado La dieta DASH es un plan de alimentación que puede ayudar a bajar la presión arterial. DASH es la sigla en inglés de \"Dietary Approaches to Stop Hypertension\" (medidas dietéticas para disminuir la hipertensión). Hipertensión significa presión arterial pita. La dieta DASH se concentra en el consumo de alimentos con alto contenido de calcio, potasio y [place]. Estos nutrientes pueden disminuir la presión arterial. Los alimentos con el mayor contenido de [place] nutrientes son las frutas, las verduras, los productos lácteos de bajo contenido de [place] rosario, las nueces, las semillas y las legumbres. Latasha abdi suplementos de calcio, potasio y magnesio, en lugar de comer alimentos ricos en estos nutrientes, no tiene el mismo efecto. La dieta DASH también incluye granos integrales, pescado y aves. La dieta DASH es manny de los cambios de estilo de jase que quizá le recomiende kate médico para reducir la presión arterial pita. Es posible que kate médico también quiera que usted reduzca la cantidad de sodio en kate Marnell Nipple. Reducir el sodio mientras sigue la dieta DASH puede bajar la presión arterial incluso más que únicamente con la dieta DASH. La atención de seguimiento es deandre parte clave de kate tratamiento y seguridad. Asegúrese de hacer y acudir a todas las citas, y llame a kate médico si está teniendo problemas. También es deandre buena idea saber los resultados de ninoska exámenes y mantener deandre lista de los medicamentos que jemma. Cómo puede cuidarse en el hogar? Cómo seguir la dieta DASH 
· Coma entre 4 y 5 porciones de fruta al día. Deandre porción incluye 1 fruta de [place], ½ taza de fruta enlatada o cortada en trozos, 1/4 taza de fruta seca o 4 onzas (½ taza) de jugo de frutas. Elija fruta con más frecuencia que jugo. · Consuma entre 4 y 11 porciones de verduras al día.  Deandre porción incluye 1 taza de garo o de verduras de hojas crudas, ½ taza de verduras cocidas o cortadas en trozos, o 4 onzas (½ taza) de jugo de verduras. Elija comer verduras con más frecuencia que abdi kate jugo. · Consuma entre 2 y 3 porciones de lácteos semidescremados y descremados al día. Deandre porción incluye 8 onzas de [place], 1 taza de yogur o 1½ onzas de [place]. · Coma entre 6 y 6 porciones de granos todos los [address] Deandre porción incluye 1 rebanada de pan, 1 onza de cereal seco, o ½ taza de arroz, pasta o cereal cocido. Trate de elegir productos de grano integral con la mayor frecuencia posible. · Limite la cantidad de [place] aves y pescado a 2 porciones al día. Reny Lulas porción son 3 onzas, lo que es aproximadamente el tamaño de un elly de naipes. · Coma entre 4 y 5 porciones de nueces, semillas y legumbres (frijoles [habichuelas], lentejas y arvejas [chícharos] secos y cocidos) a la semana. Deandre porción incluye 1/3 taza de nueces, 2 cucharadas de semillas o ½ taza de frijoles o arvejas cocidos. · [address] [facility] y aceites a 2 o 3 porciones al día. Deandre porción es 1 cucharadita de aceite vegetal o 2 cucharadas de aderezo para ensaladas. · Limite los dulces y el azúcar adicional a 5 porciones o menos por semana. Reny Lulas porción es 1 cucharada de [place], ½ taza de sorbete o 1 taza de limonada. · Consuma menos de 2,300 miligramos (mg) de sodio al día. Si limita kate consumo de sodio a 1,500 mg al día, puede reducir kate presión arterial aún más. Consejos para tener éxito · Inicie con cambios pequeños. No intente hacer cambios radicales en kate dieta de manera repentina. Podría sentir que extraña ninoska comidas favoritas y, por lo [place], darshana deandre mayor probabilidad de que no cumpla el plan. Alexia Benavides. Sundeep [place] que esos cambios se conviertan en un hábito, incorpore algunos [employer].  
· Pruebe algo de lo siguiente: 
? Fíjese el objetivo de comer deandre porción de fruta o de verduras en todas las comidas y los refrigerios. [place] facilitará alcanzar la cantidad diaria recomendada de frutas y verduras. ? Pruebe comer yogur cubierto con frutas frescas y nueces will refrigerio o will postre saludable. ? Agregue garo, tomate, pepino y cebolla a ninoska sándwiches. ? Combine deandre masa de pizza precocida con queso mozzarella de bajo contenido de grasa (\"low-fat\") y cúbralo con abundantes verduras. Intente utilizar tomates, calabaza, espinaca, brócoli, zanahorias, coliflor y cebollas. ? Opte por comer deandre variedad de verduras cortadas en trozos con un aderezo de bajo contenido de grasa will refrigerio, en lugar de \"chips\" (tipo suman fritas) y un \"dip\" (producto untable). ? [place] semillas de girasol o almendras picadas [employer]. O intente agregar nueces o almendras picadas a las verduras cocidas. ? Pruebe algunas comidas vegetarianas con frijoles y arvejas. Karina Space o frijoles rojos a las ensaladas. Prepare burritos y tacos con puré de frijoles pintos o negros. Dónde puede encontrar más información en inglés? Shelley Smaller a [email or web address].[email or web address] Radha Harrisick U081 en la búsqueda para aprender más acerca de \"Dieta DASH: Instrucciones de cuidado - [ DASH Diet: Care Instructions ]. \" 
Revisado: 9 abril, 2019 Versión del contenido: 12.2 © 6565-6755 [employer]. Las instrucciones de cuidado fueron adaptadas bajo licencia por [employer] (which disclaims liability or warranty for this information). Si usted tiene [place] afección médica o sobre estas instrucciones, siempre pregunte a kate profesional de daniela. [employer] niega toda garantía o responsabilidad por kate uso de esta información.

## 2019-12-18 NOTE — PROGRESS NOTES
1. Have you been to the ER, urgent care clinic since your last visit? Hospitalized since your last visit? No    2. Have you seen or consulted any other health care providers outside of the [address] [place] [address] since your last visit? Include any pap smears or colon screening.  No    Chief Complaint   Patient presents with    Hypertension    Blood sugar problem    Cholesterol Problem    Osteopenia    GERD

## 2019-12-30 ENCOUNTER — HOSPITAL ENCOUNTER (OUTPATIENT)
Dept: CT IMAGING | Age: 74
Discharge: HOME | End: 2019-12-30
Attending: INTERNAL MEDICINE
Payer: MEDICARE

## 2019-12-30 DIAGNOSIS — R10.32 LLQ ABDOMINAL PAIN: ICD-10-CM

## 2019-12-30 LAB — CREAT UR-MCNC: 0.8 MG/DL (ref 0.6–1.3)

## 2019-12-30 PROCEDURE — 74177 CT ABD & PELVIS W/CONTRAST: CPT

## 2019-12-30 PROCEDURE — 82565 ASSAY OF CREATININE: CPT

## 2019-12-30 PROCEDURE — 74011636320 HC RX REV CODE- 636/320: Performed by: INTERNAL MEDICINE

## 2019-12-30 RX ADMIN — IOPAMIDOL 100 ML: 612 INJECTION, SOLUTION INTRAVENOUS at 13:48

## 2020-01-13 ENCOUNTER — HOSPITAL ENCOUNTER (OUTPATIENT)
Dept: GENERAL RADIOLOGY | Age: 75
Discharge: HOME | End: 2020-01-13
Attending: INTERNAL MEDICINE
Payer: MEDICARE

## 2020-01-13 DIAGNOSIS — K56.7 ILEUS, UNSPECIFIED (HCC): ICD-10-CM

## 2020-01-13 PROCEDURE — 74250 X-RAY XM SM INT 1CNTRST STD: CPT

## 2020-01-13 PROCEDURE — 74011000255 HC RX REV CODE- 255: Performed by: INTERNAL MEDICINE

## 2020-01-13 RX ADMIN — BARIUM SULFATE 352 G: 960 POWDER, FOR SUSPENSION ORAL at 09:00

## 2020-09-08 ENCOUNTER — HOSPITAL ENCOUNTER (OUTPATIENT)
Dept: LAB | Age: 75
Discharge: HOME | End: 2020-09-08
Payer: MEDICARE

## 2020-09-08 DIAGNOSIS — I10 ESSENTIAL HYPERTENSION: ICD-10-CM

## 2020-09-08 DIAGNOSIS — E78.00 PURE HYPERCHOLESTEROLEMIA: ICD-10-CM

## 2020-09-08 DIAGNOSIS — R73.01 IFG (IMPAIRED FASTING GLUCOSE): ICD-10-CM

## 2020-09-08 LAB
ALBUMIN SERPL-MCNC: 3.5 G/DL (ref 3.4–5)
ALBUMIN/GLOB SERPL: 0.8 {RATIO} (ref 0.8–1.7)
ALP SERPL-CCNC: 81 U/L (ref 45–117)
ALT SERPL-CCNC: 19 U/L (ref 13–56)
ANION GAP SERPL CALC-SCNC: 4 MMOL/L (ref 3–18)
AST SERPL-CCNC: 17 U/L (ref 10–38)
BASOPHILS # BLD: 0 K/UL (ref 0–0.1)
BASOPHILS NFR BLD: 1 % (ref 0–2)
BILIRUB SERPL-MCNC: 0.5 MG/DL (ref 0.2–1)
BUN SERPL-MCNC: 12 MG/DL (ref 7–18)
BUN/CREAT SERPL: 17 (ref 12–20)
CALCIUM SERPL-MCNC: 8.9 MG/DL (ref 8.5–10.1)
CHLORIDE SERPL-SCNC: 109 MMOL/L (ref 100–111)
CHOLEST SERPL-MCNC: 221 MG/DL
CO2 SERPL-SCNC: 29 MMOL/L (ref 21–32)
CREAT SERPL-MCNC: 0.72 MG/DL (ref 0.6–1.3)
DIFFERENTIAL METHOD BLD: ABNORMAL
EOSINOPHIL # BLD: 0.1 K/UL (ref 0–0.4)
EOSINOPHIL NFR BLD: 1 % (ref 0–5)
ERYTHROCYTE [DISTWIDTH] IN BLOOD BY AUTOMATED COUNT: 15.7 % (ref 11.6–14.5)
GLOBULIN SER CALC-MCNC: 4.3 G/DL (ref 2–4)
GLUCOSE SERPL-MCNC: 101 MG/DL (ref 74–99)
HBA1C MFR BLD: 5.9 % (ref 4.2–5.6)
HCT VFR BLD AUTO: 40.2 % (ref 35–45)
HDLC SERPL-MCNC: 52 MG/DL (ref 40–60)
HDLC SERPL: 4.3 {RATIO} (ref 0–5)
HGB BLD-MCNC: 13.6 G/DL (ref 12–16)
LDLC SERPL CALC-MCNC: 143.4 MG/DL (ref 0–100)
LIPID PROFILE,FLP: ABNORMAL
LYMPHOCYTES # BLD: 1.5 K/UL (ref 0.9–3.6)
LYMPHOCYTES NFR BLD: 28 % (ref 21–52)
MCH RBC QN AUTO: 25.2 PG (ref 24–34)
MCHC RBC AUTO-ENTMCNC: 33.8 G/DL (ref 31–37)
MCV RBC AUTO: 74.6 FL (ref 74–97)
MONOCYTES # BLD: 0.3 K/UL (ref 0.05–1.2)
MONOCYTES NFR BLD: 5 % (ref 3–10)
NEUTS SEG # BLD: 3.6 K/UL (ref 1.8–8)
NEUTS SEG NFR BLD: 65 % (ref 40–73)
PLATELET # BLD AUTO: 191 K/UL (ref 135–420)
PMV BLD AUTO: 11.1 FL (ref 9.2–11.8)
POTASSIUM SERPL-SCNC: 4.2 MMOL/L (ref 3.5–5.5)
PROT SERPL-MCNC: 7.8 G/DL (ref 6.4–8.2)
RBC # BLD AUTO: 5.39 M/UL (ref 4.2–5.3)
SODIUM SERPL-SCNC: 142 MMOL/L (ref 136–145)
TRIGL SERPL-MCNC: 128 MG/DL (ref ?–150)
VLDLC SERPL CALC-MCNC: 25.6 MG/DL
WBC # BLD AUTO: 5.5 K/UL (ref 4.6–13.2)

## 2020-09-08 PROCEDURE — 80061 LIPID PANEL: CPT

## 2020-09-08 PROCEDURE — 85025 COMPLETE CBC W/AUTO DIFF WBC: CPT

## 2020-09-08 PROCEDURE — 36415 COLL VENOUS BLD VENIPUNCTURE: CPT

## 2020-09-08 PROCEDURE — 83036 HEMOGLOBIN GLYCOSYLATED A1C: CPT

## 2020-09-08 PROCEDURE — 80053 COMPREHEN METABOLIC PANEL: CPT

## 2020-10-05 ENCOUNTER — VIRTUAL VISIT (OUTPATIENT)
Dept: FAMILY MEDICINE CLINIC | Age: 75
End: 2020-10-05
Payer: MEDICARE

## 2020-10-05 DIAGNOSIS — E78.2 MIXED HYPERLIPIDEMIA: ICD-10-CM

## 2020-10-05 DIAGNOSIS — F41.9 ANXIETY: ICD-10-CM

## 2020-10-05 DIAGNOSIS — I10 WHITE COAT SYNDROME WITH HYPERTENSION: ICD-10-CM

## 2020-10-05 DIAGNOSIS — K21.9 GASTROESOPHAGEAL REFLUX DISEASE, UNSPECIFIED WHETHER ESOPHAGITIS PRESENT: Primary | ICD-10-CM

## 2020-10-05 DIAGNOSIS — Z71.89 ADVANCE CARE PLANNING: ICD-10-CM

## 2020-10-05 DIAGNOSIS — Z12.31 BREAST CANCER SCREENING BY MAMMOGRAM: ICD-10-CM

## 2020-10-05 DIAGNOSIS — I10 ESSENTIAL HYPERTENSION: ICD-10-CM

## 2020-10-05 DIAGNOSIS — D56.0 ALPHA THALASSEMIA (HCC): ICD-10-CM

## 2020-10-05 DIAGNOSIS — R73.01 IFG (IMPAIRED FASTING GLUCOSE): ICD-10-CM

## 2020-10-05 DIAGNOSIS — M85.80 OSTEOPENIA, UNSPECIFIED LOCATION: ICD-10-CM

## 2020-10-05 DIAGNOSIS — E66.01 SEVERE OBESITY WITH BODY MASS INDEX (BMI) OF 35.0 TO 39.9 WITH SERIOUS COMORBIDITY (HCC): ICD-10-CM

## 2020-10-05 DIAGNOSIS — Z78.0 POST-MENOPAUSAL: ICD-10-CM

## 2020-10-05 PROCEDURE — G8510 SCR DEP NEG, NO PLAN REQD: HCPCS | Performed by: FAMILY MEDICINE

## 2020-10-05 PROCEDURE — 99442 PR PHYS/QHP TELEPHONE EVALUATION 11-20 MIN: CPT | Performed by: FAMILY MEDICINE

## 2020-10-05 PROCEDURE — G8399 PT W/DXA RESULTS DOCUMENT: HCPCS | Performed by: FAMILY MEDICINE

## 2020-10-05 PROCEDURE — G8756 NO BP MEASURE DOC: HCPCS | Performed by: FAMILY MEDICINE

## 2020-10-05 PROCEDURE — 1101F PT FALLS ASSESS-DOCD LE1/YR: CPT | Performed by: FAMILY MEDICINE

## 2020-10-05 PROCEDURE — G0439 PPPS, SUBSEQ VISIT: HCPCS | Performed by: FAMILY MEDICINE

## 2020-10-05 PROCEDURE — G8427 DOCREV CUR MEDS BY ELIG CLIN: HCPCS | Performed by: FAMILY MEDICINE

## 2020-10-05 PROCEDURE — 99497 ADVNCD CARE PLAN 30 MIN: CPT | Performed by: FAMILY MEDICINE

## 2020-10-05 RX ORDER — HYDROXYZINE PAMOATE 50 MG/1
50 CAPSULE ORAL
Qty: 30 CAP | Refills: 0 | Status: SHIPPED | OUTPATIENT
Start: 2020-10-05 | End: 2020-10-05 | Stop reason: SDUPTHER

## 2020-10-05 RX ORDER — OMEPRAZOLE 40 MG/1
40 CAPSULE, DELAYED RELEASE ORAL DAILY
Qty: 90 CAP | Refills: 3 | Status: SHIPPED | OUTPATIENT
Start: 2020-10-05

## 2020-10-05 RX ORDER — HYDROCHLOROTHIAZIDE 25 MG/1
TABLET ORAL
Qty: 90 TAB | Refills: 1 | Status: SHIPPED | OUTPATIENT
Start: 2020-10-05 | End: 2021-02-11

## 2020-10-05 RX ORDER — INSULIN PUMP SYRINGE, 3 ML
EACH MISCELLANEOUS
Qty: 1 KIT | Refills: 0 | Status: SHIPPED | OUTPATIENT
Start: 2020-10-05 | End: 2021-06-03

## 2020-10-05 RX ORDER — IBUPROFEN 200 MG
CAPSULE ORAL
Qty: 200 STRIP | Refills: 2 | Status: SHIPPED | OUTPATIENT
Start: 2020-10-05 | End: 2021-06-03

## 2020-10-05 RX ORDER — HYDROXYZINE PAMOATE 50 MG/1
50 CAPSULE ORAL
Qty: 30 CAP | Refills: 0 | Status: SHIPPED | OUTPATIENT
Start: 2020-10-05 | End: 2021-03-01 | Stop reason: SDUPTHER

## 2020-10-05 NOTE — PATIENT INSTRUCTIONS
Medicare Wellness Visit, Female The best way to live healthy is to have a lifestyle where you eat a well-balanced diet, exercise regularly, limit alcohol use, and quit all forms of tobacco/nicotine, if applicable. Regular preventive services are another way to keep healthy. Preventive services (vaccines, screening tests, monitoring & exams) can help personalize your care plan, which helps you manage your own care. Screening tests can find health problems at the earliest stages, when they are easiest to treat. [facility] follows the current, evidence-based guidelines published by the [facility] ([facility]) when recommending preventive services for our patients. Because we follow these guidelines, sometimes recommendations change over time as research supports it. (For example, mammograms used to be recommended annually. Even though Medicare will still pay for an annual mammogram, the newer guidelines recommend a mammogram every two years for women of average risk). Of course, you and your doctor may decide to screen more often for some diseases, based on your risk and your co-morbidities (chronic disease you are already diagnosed with). Preventive services for you include: - Medicare offers their members a free annual wellness visit, which is time for you and your primary care provider to discuss and plan for your preventive service needs. Take advantage of this benefit every year! 
-All adults over the age of 72 should receive the recommended pneumonia vaccines. Current USPSTF guidelines recommend a series of two vaccines for the best pneumonia protection.  
-All adults should have a flu vaccine yearly and a tetanus vaccine every 10 years.  
-All adults age 48 and older should receive the shingles vaccines (series of two vaccines). -All adults age 38-68 who are overweight should have a diabetes screening test once every three years. -All adults born between [birth year] and 1967 should be screened once for Hepatitis C. 
-Other screening tests and preventive services for persons with diabetes include: an eye exam to screen for diabetic retinopathy, a kidney function test, a foot exam, and stricter control over your cholesterol.  
-Cardiovascular screening for adults with routine risk involves an electrocardiogram (ECG) at intervals determined by your doctor.  
-Colorectal cancer screenings should be done for adults age 54-65 with no increased risk factors for colorectal cancer. There are a number of acceptable methods of screening for this type of cancer. Each test has its own benefits and drawbacks. Discuss with your doctor what is most appropriate for you during your annual wellness visit. The different tests include: colonoscopy (considered the best screening method), a fecal occult blood test, a fecal DNA test, and sigmoidoscopy. 
 
-A bone mass density test is recommended when a woman turns 65 to screen for osteoporosis. This test is only recommended one time, as a screening. Some providers will use this same test as a disease monitoring tool if you already have osteoporosis. -Breast cancer screenings are recommended every other year for women of normal risk, age 54-69. 
-Cervical cancer screenings for women over age 72 are only recommended with certain risk factors. Here is a list of your current Health Maintenance items (your personalized list of preventive services) with a due date: 
Health Maintenance Due Topic Date Due  Shingles Vaccine (1 of 2) 08/24/1993  Pneumococcal Vaccine (2 of 2 - PPSV23) 10/19/2016 [address] [place] [address] Annual Well Visit  03/21/2020  Yearly Flu Vaccine (1) 09/01/2020

## 2020-10-05 NOTE — PROGRESS NOTES
This is the Subsequent Medicare Annual Wellness Exam, performed 12 months or more after the Initial AWV or the last Subsequent AWV    I have reviewed the patient's medical history in detail and updated the computerized patient record. History     Patient Active Problem List   Diagnosis Code    IFG (impaired fasting glucose) R73.01    Hyperlipidemia E78.5    GERD (gastroesophageal reflux disease) K21.9    Alpha thalassemia (HCC) D56.0    Obesity E66.9    Carotid stenosis, left I65.22    Osteopenia M85.80    Anxiety F41.9    Essential hypertension I10    Advance directive discussed with patient-AMOSCLIFFORD Adornocarlton Sonido Z71.89    Chronic scapular pain-intermittent M89.8X1, G89.29    Severe obesity with body mass index (BMI) of 35.0 to 39.9 with serious comorbidity (HCC) E66.01    Advance care planning Z71.89    White coat syndrome with hypertension I10    Adenomatous polyp of colon D12.6    Hiatal hernia K44.9     Past Medical History:   Diagnosis Date    Alpha thalassemia ([facility] 2/15/2012    Anxiety     BMI 40.0-44.9, adult ([facility] 5/10/2017    Breast tumor     benign cyst    Carotid stenosis, left     LICA <25%    Esophageal stricture     dr. Linda Leo, s/p balloon dilation 9/2016    Gastric polyps     GERD (gastroesophageal reflux disease)     endoscopy 7/08- hiatal hernia/gastric polyps, esophagitis 9/2016 on prilosec/carafate    Headache(784.0)     Hx of colonoscopy 05/26/2015    tubular adenoma, rpt 3 years 2018 dr. Lieutenant Martinez Hypertension     nuclear stress test neg 1/2009    IFG (impaired fasting glucose)     Osteopenia     Reflux esophagitis 11/06/2018    EGD, dr. Julián Tavares      Past Surgical History:   Procedure Laterality Date    HX BREAST BIOPSY  1978    bilateral breasts    HX HYSTERECTOMY       Current Outpatient Medications   Medication Sig Dispense Refill    hydrOXYzine pamoate (VISTARIL) 50 mg capsule Take 1 Cap by mouth three (3) times daily as needed for Anxiety or Sleep.  27 Cap 0    omeprazole (PRILOSEC) 40 mg capsule Take 1 Cap by mouth daily. 90 Cap 3    hydroCHLOROthiazide (HYDRODIURIL) 25 mg tablet TAKE 1 TABLET EVERY DAY 90 Tab 1    glucose blood VI test strips (blood glucose test) strip DX: E11.9 DM check fasting blood glucose once daily 200 Strip 2    Blood-Glucose Meter (OneTouch UltraMini) monitoring kit Use daily for glucose monitoring DX E11.9 1 Kit 0    cetirizine (ZYRTEC) 10 mg tablet Take 1 Tab by mouth daily. 30 Tab 0    fluticasone (FLONASE) 50 mcg/actuation nasal spray       omega-3 fatty acids-vitamin e (FISH OIL) 1,000 mg Cap Take 1 Cap by mouth daily.  multivitamin (ONE A DAY) tablet Take 1 Tab by mouth daily.  OTHER OTC ear drop for pain      ONE TOUCH DELICA 33 gauge misc USE ONE LANCET TO CHECK BLOOD SUGARS ONCE DAILY 100 Lancet 3    glucose blood VI test strips (ASCENSIA AUTODISC VI, ONE TOUCH ULTRA TEST VI) strip One Touch Ultra Use daily for blood glucose monitoring. DX: E11.9 3 Package 3     Allergies   Allergen Reactions    Bystolic [Nebivolol] Other (comments)     Leg cramps    Norvasc [Amlodipine] Swelling    Pravachol [Pravastatin] Myalgia       Family History   Problem Relation Age of Onset    Cancer Mother      Social History     Tobacco Use    Smoking status: Never Smoker    Smokeless tobacco: Never Used   Substance Use Topics    Alcohol use: No     Comment: \"Often\"       Depression Risk Factor Screening:     3 most recent PHQ Screens 10/5/2020   Little interest or pleasure in doing things Not at all   Feeling down, depressed, irritable, or hopeless Not at all   Total Score PHQ 2 0       Alcohol Risk Screen   Do you average more than 1 drink per night or more than 7 drinks a week:  No    On any one occasion in the past three months have you have had more than 3 drinks containing alcohol:  No        Functional Ability and Level of Safety:   Hearing: Hearing is good. Activities of Daily Living:   The home contains: no safety equipment. Patient does total self care     Ambulation: with mild difficulty     Fall Risk:  Fall Risk Assessment, last 12 mths 12/18/2019   Able to walk? Yes   Fall in past 12 months? No   Fall with injury? -   Number of falls in past 12 months -     Abuse Screen:  Patient is not abused       Cognitive Screening   Has your family/caregiver stated any concerns about your memory: no        Patient Care Team   Patient Care Team:  Alireza Stallings MD as PCP - General (Family Medicine)  Alireza Stallings MD as PCP - [address] [place] [address] Provider  Clarissa Wall MD (Surgery)  Brittany Purdy MD (Physical Medicine and Rehabilitation)  Galen Balbuena MD as Surgeon (Surgical Oncology)  Edie Roth MD (Orthopedic Surgery)  Javier Chisholm DO (Optometry)  Iwona Youssef MD (Gastroenterology)  Inocencia Glass MD (Gastroenterology)  Esther Gomez MD (Gastroenterology)    Assessment/Plan   Education and counseling provided:  Are appropriate based on today's review and evaluation  End-of-Life planning (with patient's consent)- discussed  Pneumococcal Vaccine-will offer on next visit  Influenza Vaccine- annually  Screening Mammography- pt wants to cont, order placed  CRCS 2015 update 2025  Cardiovascular screening blood test- 10/2020  Bone mass measurement (DEXA)= 2018 update soon order placed  Screening for glaucoma      Health Maintenance Due   Topic Date Due    Shingrix Vaccine Age 49> (1 of 2) 08/24/1993    Pneumococcal 65+ years (2 of 2 - PPSV23) 10/19/2016    Medicare Yearly Exam  03/21/2020    Flu Vaccine (1) 09/01/2020       Tee Eye, who was evaluated through a synchronous (real-time) audio only encounter, and/or her healthcare decision maker, is aware that it is a billable service, with coverage as determined by her insurance carrier. She provided verbal consent to proceed: Yes, and patient identification was verified.  It was conducted pursuant to the emergency declaration under the [address] [facility], [address] [place] [address] authority and the [employer] Act. A caregiver was present when appropriate. Ability to conduct physical exam was limited. I was in the office. The patient was at home. Nagi Ernst MD       Sissy Leal is a 66 y.o. female, evaluated via audio-only technology on 10/5/2020 for No chief complaint on file. .    Assessment & Plan:   Diagnoses and all orders for this visit:    1. Gastroesophageal reflux disease, unspecified whether esophagitis present  Stable  Cont prilosec    2. Severe obesity with body mass index (BMI) of 35.0 to 39.9 with serious comorbidity ([facility] [address]  Encouraged weight loss    3. Alpha thalassemia ([facility] [address]  Asymptomatic  Pt reports history, no labs on file, will check  -     HEMOGLOBIN FRACTIONATION; Future  -     CBC WITH AUTOMATED DIFF; Future    4. Osteopenia, unspecified location  Cont ca/vit d/wt bearing exercises  -     DEXA BONE DENSITY STUDY AXIAL; Future    5. Anxiety  Needs better control, discussed options  Agreed on prn vistaril    6. Essential hypertension  Controlled  Cont hctdz  -     METABOLIC PANEL, COMPREHENSIVE; Future    7. IFG (impaired fasting glucose)  Stable  Monitoring, weight loss encouraged  -     HEMOGLOBIN A1C WITH EAG; Future  -     METABOLIC PANEL, COMPREHENSIVE; Future    8. Breast cancer screening by mammogram  -     [facility] MAMMO BI SCREENING INCL CAD; Future    9. Post-menopausal  -     DEXA BONE DENSITY STUDY AXIAL; Future    10. Mixed hyperlipidemia  Intolerant to statin  Low fat diet, monitoring    11. White coat syndrome with hypertension    Other orders  -     hydrOXYzine pamoate (VISTARIL) 50 mg capsule; Take 1 Cap by mouth three (3) times daily as needed for Anxiety or Sleep.  -     omeprazole (PRILOSEC) 40 mg capsule; Take 1 Cap by mouth daily.   -     hydroCHLOROthiazide (HYDRODIURIL) 25 mg tablet; TAKE 1 TABLET EVERY DAY  -     glucose blood VI test strips (blood glucose test) strip; DX: E11.9 DM check fasting blood glucose once daily  -     Blood-Glucose Meter (OneTouch UltraMini) monitoring kit; Use daily for glucose monitoring DX E11.9    Ff-up in 6 weeks for anxiety ff-up  Ff-up in 3 months, labs prior. 12  Subjective:   Ff-up  Routine ff-up     HTN with white coat-  Taking  HCTZ. Home BP reading today 130/71. . She reports being on multiple hypertensives in the past, and cut out BP meds due to hypotension/ and  white coat hypertension. We have compared BP monitors in the past and consistent.      HL/prediabetes-reviewed labs,  Previously  intolerant to with muscle aches. does not want to try any other statin or zetia, she continues to take fish oil. Sg Hogdes Previous MRI 2014 showing minimal presumed chronic microvascular ischemic changes.      GERD-currently on prilosec. EGD 11/18 reviewed showing hiatal hernia, esophageal dilation. GI following    Anxiety- reports increasing anxiety past few months with covid pandemic, difficulty sleeping at times. Requesting prn medication. Denies depression symptoms,  is supportive partner. Reports previously being told she has alpha thalassemia, reviewed recent CBC wnl. No symptoms. Prior to Admission medications    Medication Sig Start Date End Date Taking? Authorizing Provider   hydrOXYzine pamoate (VISTARIL) 50 mg capsule Take 1 Cap by mouth three (3) times daily as needed for Anxiety or Sleep. 10/5/20  Yes Zeferino Meier MD   omeprazole (PRILOSEC) 40 mg capsule Take 1 Cap by mouth daily.  10/5/20  Yes Zeferino Meier MD   hydroCHLOROthiazide (HYDRODIURIL) 25 mg tablet TAKE 1 TABLET EVERY DAY 10/5/20  Yes Zeferino Meier MD   glucose blood VI test strips (blood glucose test) strip DX: E11.9 DM check fasting blood glucose once daily 10/5/20  Yes Zeferino Meier MD   Blood-Glucose Meter (OneTouch UltraMini) monitoring kit Use daily for glucose monitoring DX E11.9 10/5/20  Yes Neptali Salinas MD   cetirizine (ZYRTEC) 10 mg tablet Take 1 Tab by mouth daily. 4/19/16  Yes Neptali Salinas MD   fluticasone [place]) 50 mcg/actuation nasal spray  8/25/15  Yes Provider, Historical   omega-3 fatty acids-vitamin e (FISH OIL) 1,000 mg Cap Take 1 Cap by mouth daily. Yes Provider, Historical   multivitamin (ONE A DAY) tablet Take 1 Tab by mouth daily. Yes Provider, Historical   hydrOXYzine pamoate (VISTARIL) 50 mg capsule Take 1 Cap by mouth three (3) times daily as needed for Anxiety or Sleep. 10/5/20 10/5/20  Neptali Salinsa MD   OTHER OTC ear drop for pain    Provider, Historical   omeprazole (PRILOSEC) 40 mg capsule Take 1 Cap by mouth daily. 12/18/19 10/5/20  Neptali Salinas MD   hydroCHLOROthiazide (HYDRODIURIL) 25 mg tablet TAKE 1 TABLET EVERY DAY 12/18/19 10/5/20  Neptali Salinas MD   glucose blood VI test strips (BLOOD GLUCOSE TEST) strip DX: E11.9 DM check fasting blood glucose once daily 12/18/17 10/5/20  Neptali Salinas MD   traMADol (ULTRAM) 50 mg tablet Take 1 Tab by mouth every eight (8) hours as needed for Pain. Max Daily Amount: 150 mg. 10/28/17 10/5/20  Wilma Mcgregor MD   [facility]. 33 gauge [facility] USE ONE LANCET TO CHECK BLOOD SUGARS ONCE DAILY 4/13/17   Neptali Salinas MD   sucralfate (CARAFATE) 1 gram tablet  3/15/17 10/5/20  Provider, Historical   glucose blood VI test strips (ASCENSIA AUTODISC VI, ONE TOUCH ULTRA TEST VI) strip One Touch Ultra Use daily for blood glucose monitoring.  DX: E11.9 12/2/15   Neptali Salinas MD   Blood-Glucose Meter [facility] ULTRAMINI) monitoring kit Use daily for glucose monitoring DX E11.9 12/2/15 10/5/20  Neptali Salinas MD     Patient Active Problem List    Diagnosis Date Noted    Hiatal hernia 03/21/2019    White coat syndrome with hypertension 06/19/2018    Adenomatous polyp of colon 06/19/2018    Advance care planning 03/19/2018    Severe obesity with body mass index (BMI) of 35.0 to 39.9 with serious comorbidity ([facility] 12/01/2017    Chronic scapular pain-intermittent 06/12/2017    Advance directive discussed with patient-DARIUSZ Romero 03/27/2017    Essential hypertension 10/19/2015    IFG (impaired fasting glucose) 02/15/2012    Hyperlipidemia 02/15/2012    GERD (gastroesophageal reflux disease) 02/15/2012    Alpha thalassemia ([facility] 02/15/2012    Obesity 02/15/2012    Carotid stenosis, left     Osteopenia     Anxiety      Current Outpatient Medications   Medication Sig Dispense Refill    hydrOXYzine pamoate (VISTARIL) 50 mg capsule Take 1 Cap by mouth three (3) times daily as needed for Anxiety or Sleep. 30 Cap 0    omeprazole (PRILOSEC) 40 mg capsule Take 1 Cap by mouth daily. 90 Cap 3    hydroCHLOROthiazide (HYDRODIURIL) 25 mg tablet TAKE 1 TABLET EVERY DAY 90 Tab 1    glucose blood VI test strips (blood glucose test) strip DX: E11.9 DM check fasting blood glucose once daily 200 Strip 2    Blood-Glucose Meter (OneTouch UltraMini) monitoring kit Use daily for glucose monitoring DX E11.9 1 Kit 0    cetirizine (ZYRTEC) 10 mg tablet Take 1 Tab by mouth daily. 30 Tab 0    fluticasone (FLONASE) 50 mcg/actuation nasal spray       omega-3 fatty acids-vitamin e (FISH OIL) 1,000 mg Cap Take 1 Cap by mouth daily.  multivitamin (ONE A DAY) tablet Take 1 Tab by mouth daily.  OTHER OTC ear drop for pain      ONE TOUCH DELICA 33 gauge misc USE ONE LANCET TO CHECK BLOOD SUGARS ONCE DAILY 100 Lancet 3    glucose blood VI test strips (ASCENSIA AUTODISC VI, ONE TOUCH ULTRA TEST VI) strip One Touch Ultra Use daily for blood glucose monitoring.  DX: E11.9 3 Package 3     Allergies   Allergen Reactions    Bystolic [Nebivolol] Other (comments)     Leg cramps    Norvasc [Amlodipine] Swelling    Pravachol [Pravastatin] Myalgia     Past Medical History:   Diagnosis Date    Alpha thalassemia ([facility] 2/15/2012    Anxiety     BMI 40.0-44.9, adult ([facility] 5/10/2017    Breast tumor     benign cyst    Carotid stenosis, left     LICA <95%    Esophageal stricture     dr. Kim Spurling, s/p balloon dilation 9/2016    Gastric polyps     GERD (gastroesophageal reflux disease)     endoscopy 7/08- hiatal hernia/gastric polyps, esophagitis 9/2016 on prilosec/carafate    Headache(784.0)     Hx of colonoscopy 05/26/2015    tubular adenoma, rpt 3 years 2018 dr. Welton Sacks Hypertension     nuclear stress test neg 1/2009    IFG (impaired fasting glucose)     Osteopenia     Reflux esophagitis 11/06/2018    EGD, dr. Arie Deluca     Past Surgical History:   Procedure Laterality Date    HX BREAST BIOPSY  1978    bilateral breasts    HX HYSTERECTOMY       Family History   Problem Relation Age of Onset    Cancer Mother      Social History     Tobacco Use    Smoking status: Never Smoker    Smokeless tobacco: Never Used   Substance Use Topics    Alcohol use: No     Comment: \"Often\"       ROS    No flowsheet data found. [facility], who was evaluated through a patient-initiated, synchronous (real-time) audio only encounter, and/or her healthcare decision maker, is aware that it is a billable service, with coverage as determined by her insurance carrier. She provided verbal consent to proceed: Yes. She has not had a related appointment within my department in the past 7 days or scheduled within the next 24 hours.       Total Time: minutes: 11-20 minutes    Carlie Baeza MD

## 2020-10-05 NOTE — ACP (ADVANCE CARE PLANNING)
Advance Care Planning       Advance Care Planning (ACP) Physician/NP/PA (Provider) Conversation        Date of ACP Conversation: 10/5/2020    Conversation Conducted with:   Patient with [address] Maker:    Current Designated Health Care Decision Maker:   Primary Decision Maker: Anali Ramirez - [phone]  (If there is a [address] named in the [facility] Makers\" box in the ACP activity, but it is not visible above, be sure to open that field and then select the health care decision maker relationship (ie \"primary\") in the blank space to the right of the name.)    Note: Assess and validate information in current ACP documents, as indicated. Care Preferences:    Hospitalization: \"If your health worsens and it becomes clear that your chance of recovery is unlikely, what would your preference be regarding hospitalization? \"  If the patient would want hospitalization, answer \"yes\". If the patient would prefer comfort-focused treatment without hospitalization, answer \"no\". no      Ventilation: \"If you were in your present state of health and suddenly became very ill and were unable to breathe on your own, what would your preference be about the use of a ventilator (breathing machine) if it was available to you? \"    If patient would desire the use of a ventilator (breathing machine), answer \"yes\", if not answer \"no\":yes    \"If your health worsens and it becomes clear that your chance of recovery is unlikely, what would your preference be about the use of a ventilator (breathing machine) if it was available to you? \"   yes      Resuscitation:  \"CPR works best to restart the heart when there is a sudden event, like a heart attack, in someone who is otherwise healthy. Unfortunately, CPR does not typically restart the heart for people who have serious health conditions or who are very sick. \"    \"In the event your heart stopped as a result of an underlying serious health condition, would you want attempts to be made to restart your heart (answer \"yes\" for attempt to resuscitate) or would you prefer a natural death (answer \"no\" for do not attempt to resuscitate)? \"   yes    NOTE: If the patient has a valid advance directive AND provides care preference(s) that are inconsistent with that prior directive, advise the patient to consider either: creating a new advance directive that complies with state-specific requirements; or, if that is not possible, orally revoking that prior directive in accordance with state-specific requirements, which must be documented in the EHR.     Conversation Outcomes / Follow-Up Plan:   Recommended completion of Advance Directive      Length of Voluntary ACP Conversation in minutes:  16 minutes      Thor Bob MD

## 2020-10-06 DIAGNOSIS — M85.80 OSTEOPENIA, UNSPECIFIED LOCATION: ICD-10-CM

## 2020-10-06 DIAGNOSIS — Z78.0 POST-MENOPAUSAL: ICD-10-CM

## 2020-10-16 ENCOUNTER — TELEPHONE (OUTPATIENT)
Dept: FAMILY MEDICINE CLINIC | Age: 75
End: 2020-10-16

## 2020-10-16 DIAGNOSIS — E11.9 DIABETES MELLITUS TYPE 2, DIET-CONTROLLED (HCC): Primary | ICD-10-CM

## 2020-10-16 NOTE — TELEPHONE ENCOUNTER
Roxanne is requesting     ACCU-CHEK NIRAJ PLUS METER    ACCU-CHEK NIRAJ PLUS TEST STRIPS    ACCU-CHEK SOFTCLIX LANCETS    Please advise

## 2020-10-19 RX ORDER — LANCETS
EACH MISCELLANEOUS
Qty: 300 EACH | Refills: 11 | Status: SHIPPED | OUTPATIENT
Start: 2020-10-19 | End: 2021-11-12

## 2020-10-19 RX ORDER — INSULIN PUMP SYRINGE, 3 ML
EACH MISCELLANEOUS
Qty: 1 KIT | Refills: 0 | Status: SHIPPED | OUTPATIENT
Start: 2020-10-19

## 2020-11-05 DIAGNOSIS — Z12.31 BREAST CANCER SCREENING BY MAMMOGRAM: ICD-10-CM

## 2021-02-11 RX ORDER — HYDROCHLOROTHIAZIDE 25 MG/1
TABLET ORAL
Qty: 90 TAB | Refills: 1 | Status: SHIPPED | OUTPATIENT
Start: 2021-02-11 | End: 2022-03-03 | Stop reason: SDUPTHER

## 2021-02-18 ENCOUNTER — HOSPITAL ENCOUNTER (OUTPATIENT)
Dept: LAB | Age: 76
Discharge: HOME | End: 2021-02-18
Payer: MEDICARE

## 2021-02-18 DIAGNOSIS — I10 ESSENTIAL HYPERTENSION: ICD-10-CM

## 2021-02-18 DIAGNOSIS — R73.01 IFG (IMPAIRED FASTING GLUCOSE): ICD-10-CM

## 2021-02-18 DIAGNOSIS — D56.0 ALPHA THALASSEMIA (HCC): ICD-10-CM

## 2021-02-18 LAB
ALBUMIN SERPL-MCNC: 3.6 G/DL (ref 3.4–5)
ALBUMIN/GLOB SERPL: 0.8 {RATIO} (ref 0.8–1.7)
ALP SERPL-CCNC: 82 U/L (ref 45–117)
ALT SERPL-CCNC: 28 U/L (ref 13–56)
ANION GAP SERPL CALC-SCNC: 3 MMOL/L (ref 3–18)
AST SERPL-CCNC: 19 U/L (ref 10–38)
BASOPHILS # BLD: 0 K/UL (ref 0–0.1)
BASOPHILS NFR BLD: 0 % (ref 0–2)
BILIRUB SERPL-MCNC: 1.2 MG/DL (ref 0.2–1)
BUN SERPL-MCNC: 13 MG/DL (ref 7–18)
BUN/CREAT SERPL: 16 (ref 12–20)
CALCIUM SERPL-MCNC: 9.1 MG/DL (ref 8.5–10.1)
CHLORIDE SERPL-SCNC: 103 MMOL/L (ref 100–111)
CO2 SERPL-SCNC: 32 MMOL/L (ref 21–32)
CREAT SERPL-MCNC: 0.81 MG/DL (ref 0.6–1.3)
DIFFERENTIAL METHOD BLD: ABNORMAL
EOSINOPHIL # BLD: 0.1 K/UL (ref 0–0.4)
EOSINOPHIL NFR BLD: 2 % (ref 0–5)
ERYTHROCYTE [DISTWIDTH] IN BLOOD BY AUTOMATED COUNT: 15.9 % (ref 11.6–14.5)
EST. AVERAGE GLUCOSE BLD GHB EST-MCNC: 128 MG/DL
GLOBULIN SER CALC-MCNC: 4.5 G/DL (ref 2–4)
GLUCOSE SERPL-MCNC: 107 MG/DL (ref 74–99)
HBA1C MFR BLD: 6.1 % (ref 4.2–5.6)
HCT VFR BLD AUTO: 42.1 % (ref 35–45)
HGB BLD-MCNC: 14.2 G/DL (ref 12–16)
LYMPHOCYTES # BLD: 2.1 K/UL (ref 0.9–3.6)
LYMPHOCYTES NFR BLD: 32 % (ref 21–52)
MCH RBC QN AUTO: 25 PG (ref 24–34)
MCHC RBC AUTO-ENTMCNC: 33.7 G/DL (ref 31–37)
MCV RBC AUTO: 74 FL (ref 74–97)
MONOCYTES # BLD: 0.5 K/UL (ref 0.05–1.2)
MONOCYTES NFR BLD: 8 % (ref 3–10)
NEUTS SEG # BLD: 4 K/UL (ref 1.8–8)
NEUTS SEG NFR BLD: 58 % (ref 40–73)
PLATELET # BLD AUTO: 172 K/UL (ref 135–420)
PMV BLD AUTO: 11 FL (ref 9.2–11.8)
POTASSIUM SERPL-SCNC: 3.7 MMOL/L (ref 3.5–5.5)
PROT SERPL-MCNC: 8.1 G/DL (ref 6.4–8.2)
RBC # BLD AUTO: 5.69 M/UL (ref 4.2–5.3)
SODIUM SERPL-SCNC: 138 MMOL/L (ref 136–145)
WBC # BLD AUTO: 6.7 K/UL (ref 4.6–13.2)

## 2021-02-18 PROCEDURE — 80053 COMPREHEN METABOLIC PANEL: CPT

## 2021-02-18 PROCEDURE — 83036 HEMOGLOBIN GLYCOSYLATED A1C: CPT

## 2021-02-18 PROCEDURE — 85025 COMPLETE CBC W/AUTO DIFF WBC: CPT

## 2021-02-18 PROCEDURE — 36415 COLL VENOUS BLD VENIPUNCTURE: CPT

## 2021-02-18 PROCEDURE — 83021 HEMOGLOBIN CHROMOTOGRAPHY: CPT

## 2021-02-19 NOTE — PROGRESS NOTES
To be discussed on visit 3/1 thyromegaly. Trachea: No tracheal deviation. Cardiovascular:      Rate and Rhythm: Normal rate and regular rhythm. Heart sounds: Normal heart sounds. Comments: No carotid bruit  Pulmonary:      Effort: Pulmonary effort is normal. No respiratory distress. Breath sounds: Normal breath sounds. No wheezing. Abdominal:      General: Bowel sounds are normal.      Palpations: Abdomen is soft. Musculoskeletal:      Right lower leg: No edema. Left lower leg: No edema. Lymphadenopathy:      Cervical: No cervical adenopathy. Skin:     General: Skin is warm. Capillary Refill: Capillary refill takes less than 2 seconds. Findings: No rash. Neurological:      Mental Status: She is alert and oriented to person, place, and time. Psychiatric:         Mood and Affect: Mood normal.         Behavior: Behavior normal.         Thought Content: Thought content normal.          Current Outpatient Medications   Medication Sig Dispense Refill    repaglinide (PRANDIN) 0.5 MG tablet TAKE 1 TABLET BY MOUTH THREE TIMES DAILY BEFORE MEALS 90 tablet 3    LORazepam (ATIVAN) 0.5 MG tablet Take 1 tablet by mouth 3 times daily as needed for Anxiety for up to 60 days.  180 tablet 0    furosemide (LASIX) 80 MG tablet TAKE 1/2 TABLET BY MOUTH TWICE DAILY 90 tablet 0    levothyroxine (SYNTHROID) 50 MCG tablet Take 50 mcg by mouth daily      citalopram (CELEXA) 10 MG tablet Take 1 tablet by mouth daily 30 tablet 2    levothyroxine (SYNTHROID) 25 MCG tablet TAKE 1 TABLET BY MOUTH 3 TIMES WEEKLY(MONDAY,WEDNESDAY,FRIDAY) 38 tablet 11    sodium bicarbonate 650 MG tablet Take 1 tablet by mouth 2 times daily 60 tablet 3    fluticasone (FLONASE) 50 MCG/ACT nasal spray 1 spray      insulin lispro (HUMALOG) 100 UNIT/ML injection vial Inject into the skin      potassium chloride 20 MEQ/15ML (10%) oral solution TAKE 15 ML BY MOUTH TWICE DAILY 450 mL 0    metoprolol succinate (TOPROL XL) 25 MG extended release

## 2021-02-22 LAB
DEPRECATED HGB OTHER BLD-IMP: 0 %
HGB A MFR BLD: 97.9 % (ref 96.4–98.8)
HGB A2 MFR BLD COLUMN CHROM: 2.1 % (ref 1.8–3.2)
HGB C MFR BLD: 0 %
HGB F MFR BLD: 0 % (ref 0–2)
HGB FRACT BLD-IMP: NORMAL
HGB S BLD QL SOLY: NEGATIVE
HGB S MFR BLD: 0 %

## 2021-03-01 ENCOUNTER — OFFICE VISIT (OUTPATIENT)
Dept: FAMILY MEDICINE CLINIC | Age: 76
End: 2021-03-01
Payer: MEDICARE

## 2021-03-01 VITALS
OXYGEN SATURATION: 98 % | HEIGHT: 59 IN | DIASTOLIC BLOOD PRESSURE: 80 MMHG | TEMPERATURE: 97 F | RESPIRATION RATE: 16 BRPM | SYSTOLIC BLOOD PRESSURE: 130 MMHG | WEIGHT: 185.2 LBS | BODY MASS INDEX: 37.34 KG/M2 | HEART RATE: 76 BPM

## 2021-03-01 DIAGNOSIS — M85.80 OSTEOPENIA, UNSPECIFIED LOCATION: ICD-10-CM

## 2021-03-01 DIAGNOSIS — K21.9 GASTROESOPHAGEAL REFLUX DISEASE, UNSPECIFIED WHETHER ESOPHAGITIS PRESENT: ICD-10-CM

## 2021-03-01 DIAGNOSIS — I10 ESSENTIAL HYPERTENSION: ICD-10-CM

## 2021-03-01 DIAGNOSIS — E66.01 SEVERE OBESITY WITH BODY MASS INDEX (BMI) OF 35.0 TO 39.9 WITH SERIOUS COMORBIDITY (HCC): ICD-10-CM

## 2021-03-01 DIAGNOSIS — F41.9 ANXIETY: Primary | ICD-10-CM

## 2021-03-01 DIAGNOSIS — I10 WHITE COAT SYNDROME WITH HYPERTENSION: ICD-10-CM

## 2021-03-01 DIAGNOSIS — R73.01 IFG (IMPAIRED FASTING GLUCOSE): ICD-10-CM

## 2021-03-01 PROCEDURE — 1090F PRES/ABSN URINE INCON ASSESS: CPT | Performed by: FAMILY MEDICINE

## 2021-03-01 PROCEDURE — G8399 PT W/DXA RESULTS DOCUMENT: HCPCS | Performed by: FAMILY MEDICINE

## 2021-03-01 PROCEDURE — G8752 SYS BP LESS 140: HCPCS | Performed by: FAMILY MEDICINE

## 2021-03-01 PROCEDURE — G8536 NO DOC ELDER MAL SCRN: HCPCS | Performed by: FAMILY MEDICINE

## 2021-03-01 PROCEDURE — 99214 OFFICE O/P EST MOD 30 MIN: CPT | Performed by: FAMILY MEDICINE

## 2021-03-01 PROCEDURE — G8510 SCR DEP NEG, NO PLAN REQD: HCPCS | Performed by: FAMILY MEDICINE

## 2021-03-01 PROCEDURE — 1101F PT FALLS ASSESS-DOCD LE1/YR: CPT | Performed by: FAMILY MEDICINE

## 2021-03-01 PROCEDURE — G8427 DOCREV CUR MEDS BY ELIG CLIN: HCPCS | Performed by: FAMILY MEDICINE

## 2021-03-01 PROCEDURE — G8417 CALC BMI ABV UP PARAM F/U: HCPCS | Performed by: FAMILY MEDICINE

## 2021-03-01 PROCEDURE — G8754 DIAS BP LESS 90: HCPCS | Performed by: FAMILY MEDICINE

## 2021-03-01 RX ORDER — HYDROXYZINE PAMOATE 50 MG/1
50 CAPSULE ORAL
Qty: 90 CAP | Refills: 0 | Status: SHIPPED | OUTPATIENT
Start: 2021-03-01

## 2021-03-01 NOTE — PATIENT INSTRUCTIONS
Aprenda sobre el calcio Learning About Calcium Qué es el calcio? El calcio [employer] y los músculos, incluido el corazón, saludables y rohan. Kate cuerpo necesita la vitamina D para absorber el calcio. Las personas que no obtienen la cantidad suficiente de calcio y vitamina D cruzito kate jase tienen mayores probabilidades de tener huesos débiles y quebradizos (osteoporosis) cuando llegan a deandre edad avanzada. Los huesos débiles y quebradizos se fracturan fácilmente. Pueden provocar lesiones graves. Amaya es el motivo por el cual es importante que usted obtenga la cantidad suficiente de calcio y vitamina D de octavio y de [place]. Ayuda a [employer] rohan a medida que usted envejece. Y lo protege de posibles fracturas. El cuerpo también utiliza la vitamina D para ayudar a los músculos a absorber el calcio y a funcionar adecuadamente. Si los músculos no obtienen la cantidad suficiente de calcio, pueden acalambrarse, doler o sentirse débiles. Usted podría tener emily musculares a caren plazo (crónicos). Cuánto calcio necesita? Cuánto calcio necesita cada día cambia con la edad. Estas son las cantidades diarias recomendadas (RDA, por ninoska siglas en inglés) para el calcio: 
· Edades entre 1 y 3 años: [address] · Edades entre 4 y 6 años: 1,000 miligramos · Edades entre 9 y 25 años: 1,300 miligramos · Edades entre 19 y 48 años: 1,000 miligramos · Hombres entre 51 y 79 años: 1,000 miligramos · Mujeres entre 51 y 79 años: 1,200 miligramos · [address] [facility] [address] y mayor: [address] Las mujeres embarazadas o que estén amamantando necesitan la misma cantidad de calcio y vitamina D que otras mujeres de kate edad. Cómo puede obtener suficiente calcio? El calcio se encuentra en alimentos will la [place] y el yogur. Las verduras will el brócoli, la col rizada y el repollo larry también lo contienen.  Puede obtener calcio si consume los huesos blandos y comestibles de las nayeli enlatadas y el salmón enlatado. Los alimentos con calcio agregado (enriquecido) incluyen algunos cereales, jugos, bebidas de soya y tofu. La etiqueta del alimento le muestra la cantidad de calcio que se agregó. Usted puede calcular cuánto calcio hay en un alimento fijándose en la sección de porcentaje del valor diario en la etiqueta de información nutricional. La etiqueta del alimento calcula que el valor diario del calcio es 1,300 mg. De modo que si deandre porción de un alimento tiene un valor diario de 20% de calcio, kavin alimento tiene 260 mg de calcio en deandre porción. Algunas personas que no obtienen suficiente calcio podrían necesitar suplementos. Dos suplementos de calcio comunes son citrato de calcio y carbonato de calcio. El carbonato de calcio se absorbe mejor cuando se jemma con comida. El citrato de calcio se absorbe nicole con o sin comida. Distribuir el calcio a lo caren del día puede reducir el malestar estomacal. Y el organismo lo absorbe mejor cuando se lo distribuye a lo caren del día. Trate de no abdi más de 500 mg de suplemento de calcio de deandre vez. Dónde puede encontrar más información en inglés? Valdemar Ruth a [email or web address].clark.com/ Raghav BAKER en la búsqueda para aprender más acerca de \"Aprenda sobre el calcio. \" Revisado: 17 diciembre, 2020               Versión del contenido: 12.8 © 5897-3247 Healthwise, Incorporated. Las instrucciones de cuidado fueron adaptadas bajo licencia por [employer] (which disclaims liability or warranty for this information). Si usted tiene [place] afección médica o sobre estas instrucciones, siempre pregunte a kate profesional de daniela. [facility] niega toda garantía o responsabilidad por kate uso de esta información.

## 2021-03-01 NOTE — PROGRESS NOTES
1. Have you been to the ER, urgent care clinic since your last visit? Hospitalized since your last visit? No    2. Have you seen or consulted any other health care providers outside of the [address] [place] [address] since your last visit? Include any pap smears or colon screening.  Dr. Bull Morales: 5/2020 for eye exam.    Chief Complaint   Patient presents with    GERD    Osteopenia    Hypertension    Anxiety    Cholesterol Problem    Stress

## 2021-03-01 NOTE — PROGRESS NOTES
Raul Meek, 74 y.o.,  female    SUBJECTIVE  Routine ff-up    HTN with white coat-  Taking  HCTZ. Home BP readings 120/80's. She reports being on multiple hypertensives in the past, and cut out BP meds due to hypotension/ and  white coat hypertension. We have compared BP monitors in the past and consistent. HL/prediabetes- says intolerant to lipitor/pravachol pt reports muscle aches. does not want to try any other statin. Previous MRI 2014 showing minimal presumed chronic microvascular ischemic changes. GERD-currently on prilosec, reports better response on nexium however too costly. Recommended bulk stores as option,  EGD 11/18 reviewed showing hiatal hernia, esophageal dilation. GI following. Due for CRCS update 5/2020    Anxiety- reports response to vistaril prn    H/o anemia/pt reported h/o alpha thalassemia- Hgb EP, and cbc are normal. We took diagnosis off her chart. ROS:  See HPI, all others negative        Patient Active Problem List   Diagnosis Code    IFG (impaired fasting glucose) R73.01    Hyperlipidemia E78.5    GERD (gastroesophageal reflux disease) K21.9    Alpha thalassemia (HCC) D56.0    Obesity E66.9    Carotid stenosis, left I65.22    Osteopenia M85.80    Anxiety F41.9    Hypertensive retinopathy H35.039    Essential hypertension I10    Advance directive discussed with patient-DARIUSZ Orozco Z71.89    Chronic scapular pain-intermittent M89.8X1, G89.29    Severe obesity (BMI 35.0-39.9) ([facility]) E66.01       Current Outpatient Medications:     hydrOXYzine pamoate (VISTARIL) 50 mg capsule, Take 1 Cap by mouth three (3) times daily as needed for Anxiety or Sleep., Disp: 90 Cap, Rfl: 0    hydroCHLOROthiazide (HYDRODIURIL) 25 mg tablet, TAKE 1 TABLET EVERY DAY, Disp: 90 Tab, Rfl: 1    Blood-Glucose Meter monitoring kit, Accu-chek Zulma plus meter. Check fasting glucose daily.  DX: E11.9, Disp: 1 Kit, Rfl: 0    glucose blood VI test strips (ASC[employer]"IA AUTODISC VI, ONE TOUCH ULTRA TEST VI) strip, Accu-chek Zulma plus test strip. Check fasting glucose once daily. DX: E11.9, Disp: 300 Strip, Rfl: 3    lancets misc, Accu-chek Softclix lancets. Check fasting glucose once daily. DX: E11.9, Disp: 300 Each, Rfl: 11    omeprazole (PRILOSEC) 40 mg capsule, Take 1 Cap by mouth daily. , Disp: 90 Cap, Rfl: 3    glucose blood VI test strips (blood glucose test) strip, DX: E11.9 DM check fasting blood glucose once daily, Disp: 200 Strip, Rfl: 2    Blood-Glucose Meter (OneTouch UltraMini) monitoring kit, Use daily for glucose monitoring DX E11.9, Disp: 1 Kit, Rfl: 0    OTHER, OTC ear drop for pain, Disp: , Rfl:     ONE TOUCH DELICA 33 gauge misc, USE ONE LANCET TO CHECK BLOOD SUGARS ONCE DAILY, Disp: 100 Lancet, Rfl: 3    cetirizine (ZYRTEC) 10 mg tablet, Take 1 Tab by mouth daily. , Disp: 30 Tab, Rfl: 0    glucose blood VI test strips (ASCENSIA AUTODISC VI, ONE TOUCH ULTRA TEST VI) strip, One Touch Ultra Use daily for blood glucose monitoring. DX: E11.9, Disp: 3 Package, Rfl: 3    omega-3 fatty acids-vitamin e (FISH OIL) 1,000 mg Cap, Take 1 Cap by mouth daily. , Disp: , Rfl:     multivitamin (ONE A DAY) tablet, Take 1 Tab by mouth daily.   , Disp: , Rfl:     fluticasone (FLONASE) 50 mcg/actuation nasal spray, , Disp: , Rfl:     Allergies   Allergen Reactions    Bystolic [Nebivolol] Other (comments)     Leg cramps    Norvasc [Amlodipine] Swelling    Pravachol [Pravastatin] Myalgia       Past Medical History:   Diagnosis Date    [facility]assemia [facility] 2/15/2012    Anxiety     [facility] 40.0-44.9[facility] 5/10/2017    Breast tumor     benign cyst    Carotid stenosis, left     LICA <55%    Esophageal stricture     dr. Araiza Fus, s/p balloon dilation 9/2016    Gastric polyps     GERD (gastroesophageal reflux disease)     endoscopy 7/08- hiatal hernia/gastric polyps, esophagitis 9/2016 on prilosec/carafate    Headache(784.0)     Hypertension     nuclear stress test neg 1/2009    IFG (impaired fasting glucose)     Osteopenia        Social History     Social History    Marital status:      Spouse name: N/A    Number of children: N/A    Years of education: N/A     Occupational History    Not on file. Social History Main Topics    Smoking status: Never Smoker    Smokeless tobacco: Never Used    Alcohol use No      Comment: \"Often\"    Drug use: No    Sexual activity: Yes     Partners: Male     Other Topics Concern    Not on file     Social History Narrative       Family History   Problem Relation Age of Onset    Cancer Mother          OBJECTIVE    Physical Exam:     Visit Vitals  /80   Pulse 76   Temp 97 °F (36.1 °C) (Temporal)   Resp 16   Ht 4' 11\" (1.499 m)   Wt 185 lb 3.2 oz (84 kg)   SpO2 98%   BMI 37.41 kg/m²         General: alert, n no apparent distress or pain  CVS: normal rate, regular rhythm, distinct S1 and S2  Lungs:clear to ausculation bilaterally, no crackles, wheezing or rhonchi noted  Abdomen: soft, NT, ND  Extremities: no edema  Skin: warm, no lesions, rashes noted  Psych:  mood and affect normal  Results for orders placed or performed during the hospital encounter of 02/18/21   HEMOGLOBIN FRACTIONATION   Result Value Ref Range    Hgb Solubility Negative Negative      Hemoglobin A 97.9 96.4 - 98.8 %    Hemoglobin S 0.0 0.0 %    Hemoglobin C 0.0 0.0 %    Hemoglobin A2 2.1 1.8 - 3.2 %    Hemoglobin F 0.0 0.0 - 2.0 %    Hemoglobin, Other 0.0 0.0 %    Interpretation Comment     CBC WITH AUTOMATED DIFF   Result Value Ref Range    WBC 6.7 4.6 - 13.2 K/uL    RBC 5.69 (H) 4.20 - 5.30 M/uL    HGB 14.2 12.0 - 16.0 g/dL    HCT 42.1 35.0 - 45.0 %    MCV 74.0 74.0 - 97.0 FL    MCH 25.0 24.0 - 34.0 PG    MCHC 33.7 31.0 - 37.0 g/dL    RDW 15.9 (H) 11.6 - 14.5 %    PLATELET 529 854 - 275 K/uL    MPV 11.0 9.2 - 11.8 FL    NEUTROPHILS 58 40 - 73 %    LYMPHOCYTES 32 21 - 52 %    MONOCYTES 8 3 - 10 %    EOSINOPHILS 2 0 - 5 %    BASOPHILS 0 0 - 2 %    ABS.  NEUTROPHILS 4.0 1.8 - 8.0 K/UL    ABS. LYMPHOCYTES 2.1 0.9 - 3.6 K/UL    ABS. MONOCYTES 0.5 0.05 - 1.2 K/UL    ABS. EOSINOPHILS 0.1 0.0 - 0.4 K/UL    ABS. BASOPHILS 0.0 0.0 - 0.1 K/UL    DF AUTOMATED     HEMOGLOBIN A1C WITH EAG   Result Value Ref Range    Hemoglobin A1c 6.1 (H) 4.2 - 5.6 %    Est. average glucose 191 mg/dL   METABOLIC PANEL, COMPREHENSIVE   Result Value Ref Range    Sodium 138 136 - 145 mmol/L    Potassium 3.7 3.5 - 5.5 mmol/L    Chloride 103 100 - 111 mmol/L    CO2 32 21 - 32 mmol/L    Anion gap 3 3.0 - 18 mmol/L    Glucose 107 (H) 74 - 99 mg/dL    BUN 13 7.0 - 18 MG/DL    Creatinine 0.81 0.6 - 1.3 MG/DL    BUN/Creatinine ratio 16 12 - 20      GFR est AA >60 >60 ml/min/1.73m2    GFR est non-AA >60 >60 ml/min/1.73m2    Calcium 9.1 8.5 - 10.1 MG/DL    Bilirubin, total 1.2 (H) 0.2 - 1.0 MG/DL    ALT (SGPT) 28 13 - 56 U/L    AST (SGOT) 19 10 - 38 U/L    Alk.  phosphatase 82 45 - 117 U/L    Protein, total 8.1 6.4 - 8.2 g/dL    Albumin 3.6 3.4 - 5.0 g/dL    Globulin 4.5 (H) 2.0 - 4.0 g/dL    A-G Ratio 0.8 0.8 - 1.7         ASSESSMENT/PLAN  Bertha was seen today for hypertension, cholesterol problem    Diagnoses and associated orders for this visit:    Anxiety  Fair control  Cont prn vistaril    Essential hypertension-  controlled   cont HCTZ   H/o hypotension with increase in BP meds  monitoring  CMP prior to next visit    White coat phenomenon with diagnosis of hypertension    Prediabetes-   Persistent a1c 6.1  TLCs, monitoring    Hyperlipidemia-   intolerant to lipitor/pravachol , calculated 18.8yr cv risk 12% foa ge  She does not want to try another statin, advised low fat  Diet, monitoring  Consider zetia, she is still hesitant    Osteopenia-   cont vit D/ Ca RDA  Update dexa scheduled 5/2021 along with mammogram screening    GERD-   Fairly stable  Back on prilosec due to cost of nexium, recommend trial bulk stores for better cost  H/o esophageal dilation ,  hiatal hernia  Avoid food triggers, smaller meals  GI following    BMI 37  Discussed weight loss strategies, limit carbs rice/avoid apple juice    Declines flu/PCV  vaccines    Follow-up Disposition:  Return in about 3 months or if symptoms worsen or fail to improve. VV then     Patient understands plan of care. Patient has provided input and agrees with goals.

## 2021-04-06 ENCOUNTER — PATIENT OUTREACH (OUTPATIENT)
Dept: CASE MANAGEMENT | Age: 76
End: 2021-04-06

## 2021-04-06 NOTE — PROGRESS NOTES
Complex Case Management      Date/Time:  2021 9:36 AM    Method of communication with patient:phone    [facility] family by telephone to perform Ambulatory Care Coordination. Spoke to patient's , Brionna Sanford. Verified ADRIAN. Verified name and  (PHI) with family as identifiers. Provided introduction to self, and explanation of the Ambulatory Care Manager's role. Reviewed most recent clinic visit w/ family who verbalized understanding. Family given an opportunity to ask questions. Top Challenges reviewed with the patient   1. Recent ED visit for headaches/dizziness. Per , issues has mostly resolved. States they have not yet made the appt for the neurologist referral.  Recommended calling for the appt. 2. Hx of GERD, Osteopenia, HTN, H/A, Carotid Stenosis, Anxiety  3. Recommended sooner PCP appt,  deferred. No other needs at this time. The family agrees to contact the PCP office or the [facility] for questions related to their healthcare. Provided contact information for future reference. Disease Specific:   N/A    Home Health Active: No    DME Active: No    Barriers to care? none    Advance Care Planning:   Does patient have an Advance Directive:  not on file; education provided     Medication(s):   Medication reconciliation was not performed with family, who verbalizes understanding of administration of home medications. There were no barriers to obtaining medications identified at this time. Referral to Pharm D needed: no     Current Outpatient Medications   Medication Sig    hydrOXYzine pamoate (VISTARIL) 50 mg capsule Take 1 Cap by mouth three (3) times daily as needed for Anxiety or Sleep.  hydroCHLOROthiazide (HYDRODIURIL) 25 mg tablet TAKE 1 TABLET EVERY DAY    Blood-Glucose Meter monitoring kit Accu-chek Zulma plus meter. Check fasting glucose daily.  DX: E11.9    glucose blood VI test strips (ASCENSIA AUTODISC VI, ONE TOUCH ULTRA TEST VI) strip Accu-chek Zulma plus test strip. Check fasting glucose once daily. DX: E11.9    lancets misc Accu-chek Softclix lancets. Check fasting glucose once daily. DX: E11.9    omeprazole (PRILOSEC) 40 mg capsule Take 1 Cap by mouth daily.  glucose blood VI test strips (blood glucose test) strip DX: E11.9 DM check fasting blood glucose once daily    Blood-Glucose Meter (OneTouch UltraMini) monitoring kit Use daily for glucose monitoring DX E11.9    OTHER OTC ear drop for pain    ONE TOUCH DELICA 33 gauge misc USE ONE LANCET TO CHECK BLOOD SUGARS ONCE DAILY    cetirizine (ZYRTEC) 10 mg tablet Take 1 Tab by mouth daily.  glucose blood VI test strips (ASCENSIA AUTODISC VI, ONE TOUCH ULTRA TEST VI) strip One Touch Ultra Use daily for blood glucose monitoring. DX: E11.9    fluticasone (FLONASE) 50 mcg/actuation nasal spray     omega-3 fatty acids-vitamin e (FISH OIL) 1,000 mg Cap Take 1 Cap by mouth daily.  multivitamin (ONE A DAY) tablet Take 1 Tab by mouth daily. No current facility-administered medications for this visit.         [facility] follow up appointment(s):   Future Appointments   Date Time Provider Rubia Boss   5/3/2021  8:00 AM [facility] SHAGUFTA RM 4 3D HBVRMAM HBV   5/3/2021  8:30 AM HBV BONE DEXA RM 1 HBVRBD HBV   6/3/2021  9:00 AM Tawanda Mclaughlin MD FP BS AMB        Non-[facility] follow up appointment(s):     Goals Addressed                 This Visit's Progress     Attends follow up appointments on schedule        4/6/21 Patient will attend all scheduled appointments through 7/6/21       Knowledge and adherence of prescribed medication (ie. action, side effects, missed dose, etc.).        4/6/21 Pt will take all medications prescribed to be evaluated on each outreach through  7/6/21       Prepare patients and caregivers for end of life decisions (ie. need for hospice, pain management, symptom relief, advance directives etc.)        4/6/21 Pt will complete an ACP and have it scanned into their EMR by 7/6/21

## 2021-04-23 ENCOUNTER — PATIENT OUTREACH (OUTPATIENT)
Dept: CASE MANAGEMENT | Age: 76
End: 2021-04-23

## 2021-04-23 NOTE — PROGRESS NOTES
Attempted to contact patient for Complex Care follow up. No answer, left message with contact information provided. Will attempt to contact at a later time.

## 2021-04-28 ENCOUNTER — PATIENT OUTREACH (OUTPATIENT)
Dept: CASE MANAGEMENT | Age: 76
End: 2021-04-28

## 2021-05-03 ENCOUNTER — HOSPITAL ENCOUNTER (OUTPATIENT)
Dept: MAMMOGRAPHY | Age: 76
Discharge: HOME | End: 2021-05-03
Attending: FAMILY MEDICINE
Payer: MEDICARE

## 2021-05-03 ENCOUNTER — HOSPITAL ENCOUNTER (OUTPATIENT)
Dept: BONE DENSITY | Age: 76
Discharge: HOME | End: 2021-05-03
Attending: FAMILY MEDICINE
Payer: MEDICARE

## 2021-05-03 DIAGNOSIS — Z12.31 BREAST CANCER SCREENING BY MAMMOGRAM: ICD-10-CM

## 2021-05-03 PROCEDURE — 77063 BREAST TOMOSYNTHESIS BI: CPT

## 2021-05-03 PROCEDURE — 77080 DXA BONE DENSITY AXIAL: CPT

## 2021-05-03 NOTE — PROGRESS NOTES
Mammogram normal  Bone density similar to previous osteopenia (borderline osteoporosis)   pls print out calcium requirement patient hand out in our AVS list in Taj[place] and mail to patient. Will discuss in June appt.

## 2021-05-11 ENCOUNTER — PATIENT OUTREACH (OUTPATIENT)
Dept: CASE MANAGEMENT | Age: 76
End: 2021-05-11

## 2021-05-13 NOTE — PROGRESS NOTES
Patient identified with 2 identifiers (name and ). Patient aware of Mammogram normal   Bone density similar to previous osteopenia (borderline osteoporosis)    pls print out calcium requirement patient hand out.  Hand out mailed to patient

## 2021-05-26 ENCOUNTER — HOSPITAL ENCOUNTER (OUTPATIENT)
Dept: LAB | Age: 76
Discharge: HOME | End: 2021-05-26
Payer: MEDICARE

## 2021-05-26 DIAGNOSIS — I10 WHITE COAT SYNDROME WITH HYPERTENSION: ICD-10-CM

## 2021-05-26 LAB
ALBUMIN SERPL-MCNC: 3.5 G/DL (ref 3.4–5)
ALBUMIN/GLOB SERPL: 0.8 {RATIO} (ref 0.8–1.7)
ALP SERPL-CCNC: 85 U/L (ref 45–117)
ALT SERPL-CCNC: 17 U/L (ref 13–56)
ANION GAP SERPL CALC-SCNC: 2 MMOL/L (ref 3–18)
AST SERPL-CCNC: 15 U/L (ref 10–38)
BILIRUB SERPL-MCNC: 0.9 MG/DL (ref 0.2–1)
BUN SERPL-MCNC: 14 MG/DL (ref 7–18)
BUN/CREAT SERPL: 18 (ref 12–20)
CALCIUM SERPL-MCNC: 9.5 MG/DL (ref 8.5–10.1)
CHLORIDE SERPL-SCNC: 104 MMOL/L (ref 100–111)
CO2 SERPL-SCNC: 31 MMOL/L (ref 21–32)
CREAT SERPL-MCNC: 0.79 MG/DL (ref 0.6–1.3)
GLOBULIN SER CALC-MCNC: 4.2 G/DL (ref 2–4)
GLUCOSE SERPL-MCNC: 97 MG/DL (ref 74–99)
POTASSIUM SERPL-SCNC: 3.9 MMOL/L (ref 3.5–5.5)
PROT SERPL-MCNC: 7.7 G/DL (ref 6.4–8.2)
SODIUM SERPL-SCNC: 137 MMOL/L (ref 136–145)

## 2021-05-26 PROCEDURE — 80053 COMPREHEN METABOLIC PANEL: CPT

## 2021-05-26 PROCEDURE — 36415 COLL VENOUS BLD VENIPUNCTURE: CPT

## 2021-06-03 ENCOUNTER — VIRTUAL VISIT (OUTPATIENT)
Dept: FAMILY MEDICINE CLINIC | Age: 76
End: 2021-06-03

## 2021-06-03 RX ORDER — MECLIZINE HYDROCHLORIDE 25 MG/1
TABLET ORAL
COMMUNITY
Start: 2021-05-25

## 2021-06-29 ENCOUNTER — TELEPHONE (OUTPATIENT)
Dept: FAMILY MEDICINE CLINIC | Age: 76
End: 2021-06-29

## 2021-06-29 NOTE — TELEPHONE ENCOUNTER
Date of surgery 7/19/2021, 8/2/2021  Type of surgery Right cataract, left cataract  Facility that procedure will be performed [address] [place] [address] name that will perform procedure Dr. Jammie Cavanaugh  Anesthesia type General  Pre op testing date   Clear patient in note of forms will be faxed to [facility] of contact in specialty office:   Name Adriel Sosa    phone [phone] ext [phone]    fax [phone]

## 2021-07-15 ENCOUNTER — OFFICE VISIT (OUTPATIENT)
Dept: FAMILY MEDICINE CLINIC | Age: 76
End: 2021-07-15
Payer: MEDICARE

## 2021-07-15 VITALS
TEMPERATURE: 97.8 F | RESPIRATION RATE: 16 BRPM | WEIGHT: 187 LBS | HEART RATE: 68 BPM | SYSTOLIC BLOOD PRESSURE: 132 MMHG | BODY MASS INDEX: 37.7 KG/M2 | OXYGEN SATURATION: 97 % | DIASTOLIC BLOOD PRESSURE: 86 MMHG | HEIGHT: 59 IN

## 2021-07-15 DIAGNOSIS — I10 ESSENTIAL HYPERTENSION: ICD-10-CM

## 2021-07-15 DIAGNOSIS — E78.2 MIXED HYPERLIPIDEMIA: ICD-10-CM

## 2021-07-15 DIAGNOSIS — R73.03 PREDIABETES: ICD-10-CM

## 2021-07-15 DIAGNOSIS — Z01.818 PRE-OP EVALUATION: Primary | ICD-10-CM

## 2021-07-15 DIAGNOSIS — E66.01 SEVERE OBESITY WITH BODY MASS INDEX (BMI) OF 35.0 TO 39.9 WITH SERIOUS COMORBIDITY (HCC): ICD-10-CM

## 2021-07-15 PROCEDURE — G8752 SYS BP LESS 140: HCPCS | Performed by: FAMILY MEDICINE

## 2021-07-15 PROCEDURE — 1101F PT FALLS ASSESS-DOCD LE1/YR: CPT | Performed by: FAMILY MEDICINE

## 2021-07-15 PROCEDURE — 99214 OFFICE O/P EST MOD 30 MIN: CPT | Performed by: FAMILY MEDICINE

## 2021-07-15 PROCEDURE — G8754 DIAS BP LESS 90: HCPCS | Performed by: FAMILY MEDICINE

## 2021-07-15 PROCEDURE — G8536 NO DOC ELDER MAL SCRN: HCPCS | Performed by: FAMILY MEDICINE

## 2021-07-15 PROCEDURE — G8427 DOCREV CUR MEDS BY ELIG CLIN: HCPCS | Performed by: FAMILY MEDICINE

## 2021-07-15 PROCEDURE — G8510 SCR DEP NEG, NO PLAN REQD: HCPCS | Performed by: FAMILY MEDICINE

## 2021-07-15 PROCEDURE — G8417 CALC BMI ABV UP PARAM F/U: HCPCS | Performed by: FAMILY MEDICINE

## 2021-07-15 PROCEDURE — 1090F PRES/ABSN URINE INCON ASSESS: CPT | Performed by: FAMILY MEDICINE

## 2021-07-15 PROCEDURE — G8399 PT W/DXA RESULTS DOCUMENT: HCPCS | Performed by: FAMILY MEDICINE

## 2021-07-15 NOTE — PROGRESS NOTES
Preoperative Evaluation    Date of Exam: 7/15/2021    Luba Sosa is a 66 y.o. female ([phone]) who presents for preoperative evaluation for cataract surgery under topical with MAC, Dr. Jimmie Szymanski    Pt feels well, denies cp, sob. she has h/o HTN/HL/prediabetes which have been under good control on medication. Problem List:     Patient Active Problem List    Diagnosis Date Noted    Hiatal hernia 03/21/2019    White coat syndrome with hypertension 06/19/2018    Adenomatous polyp of colon 06/19/2018    Advance care planning 03/19/2018    Severe obesity with body mass index (BMI) of 35.0 to 39.9 with serious comorbidity ([facility] [address] 12/01/2017    Chronic scapular pain-intermittent 06/12/2017    Advance directive discussed with patient-DARIUSZ Chu 03/27/2017    Essential hypertension 10/19/2015    IFG (impaired fasting glucose) 02/15/2012    Hyperlipidemia 02/15/2012    GERD (gastroesophageal reflux disease) 02/15/2012    Obesity 02/15/2012    Carotid stenosis, left     Osteopenia     Anxiety      Medical History:     Past Medical History:   Diagnosis Date    Alpha thalassemia ([facility] 2/15/2012    Anxiety     BMI 40.0-44.9, adult ([facility] 5/10/2017    Breast tumor     benign cyst    Carotid stenosis, left     LICA <40%    Esophageal stricture     dr. Pradeep Madera, s/p balloon dilation 9/2016    Gastric polyps     GERD (gastroesophageal reflux disease)     endoscopy 7/08- hiatal hernia/gastric polyps, esophagitis 9/2016 on prilosec/carafate    Headache(784.0)     Hx of colonoscopy 05/26/2015    tubular adenoma, rpt 3 years 2018 dr. Gillian Prieto Hypertension     nuclear stress test neg 1/2009    IFG (impaired fasting glucose)     Osteopenia     Reflux esophagitis 11/06/2018    EGD, dr. Emerson Mcfarland     Allergies:      Allergies   Allergen Reactions    Bystolic [Nebivolol] Other (comments)     Leg cramps    Norvasc [Amlodipine] Swelling    Pravachol [Pravastatin] Myalgia      Medications: Current Outpatient Medications   Medication Sig    meclizine (ANTIVERT) 25 mg tablet     hydrOXYzine pamoate (VISTARIL) 50 mg capsule Take 1 Cap by mouth three (3) times daily as needed for Anxiety or Sleep.  hydroCHLOROthiazide (HYDRODIURIL) 25 mg tablet TAKE 1 TABLET EVERY DAY    Blood-Glucose Meter monitoring kit Accu-chek Zulma plus meter. Check fasting glucose daily. DX: E11.9    glucose blood VI test strips (ASCENSIA AUTODISC VI, ONE TOUCH ULTRA TEST VI) strip Accu-chek Zulma plus test strip. Check fasting glucose once daily. DX: E11.9    lancets misc Accu-chek Softclix lancets. Check fasting glucose once daily. DX: E11.9    omeprazole (PRILOSEC) 40 mg capsule Take 1 Cap by mouth daily.  OTHER OTC ear drop for pain    fluticasone (FLONASE) 50 mcg/actuation nasal spray     omega-3 fatty acids-vitamin e (FISH OIL) 1,000 mg Cap Take 1 Cap by mouth daily.  multivitamin (ONE A DAY) tablet Take 1 Tab by mouth daily.  cetirizine (ZYRTEC) 10 mg tablet Take 1 Tab by mouth daily. (Patient not taking: Reported on 7/15/2021)     No current facility-administered medications for this visit.      Surgical History:     Past Surgical History:   Procedure Laterality Date    HX BREAST BIOPSY  1978    bilateral breasts    HX HYSTERECTOMY       Social History:     Social History     Socioeconomic History    Marital status:      Spouse name: Not on file    Number of children: Not on file    Years of education: Not on file    Highest education level: Not on file   Tobacco Use    Smoking status: Never Smoker    Smokeless tobacco: Never Used   Substance and Sexual Activity    Alcohol use: No     Comment: \"Often\"    Drug use: No    Sexual activity: Yes     Partners: Male     Social Determinants of Health     Financial Resource Strain:     Difficulty of Paying Living Expenses:    Food Insecurity:     Worried About Running Out of Food in the Last Year:     [address] in the Last Year: Transportation Needs:     Lack of Transportation (Medical):  Lack of Transportation (Non-Medical):    Physical Activity:     Days of Exercise per Week:     Minutes of Exercise per Session:    Stress:     Feeling of Stress :    Social Connections:     Frequency of Communication with Friends and Family:     Frequency of Social Gatherings with Friends and Family:     Attends [employer] Services:     Active Member of Clubs or Organizations:     Attends Club or Organization Meetings:     Marital Status:          Objective:     ROS:   Feeling well. No dyspnea or chest pain on exertion. No abdominal pain, change in bowel habits, black or bloody stools. No urinary tract symptoms. No neurological complaints. OBJECTIVE:   The patient appears well, alert, oriented x 3, in no distress. Visit Vitals  /86 (BP 1 Location: Left upper arm, BP Patient Position: Sitting, BP Cuff Size: Adult)   Pulse 68   Temp 97.8 °F (36.6 °C) (Oral)   Resp 16   Ht 4' 11\" (1.499 m)   Wt 187 lb (84.8 kg)   SpO2 97%   BMI 37.77 kg/m²     HEENT:ENT normal.  Neck supple. No adenopathy or thyromegaly. HALLE. Chest: Lungs are clear, good air entry, no wheezes, rhonchi or rales. Cardiovascular: S1 and S2 normal, no murmurs, regular rate and rhythm. Abdomen: soft without tenderness, guarding, mass or organomegaly. Extremities: show no edema, normal peripheral pulses. Neurological: is normal, no focal findings. IMPRESSION:   Diagnoses and all orders for this visit:    1. Pre-op evaluation  Low risk for planned surgery  No contraindications to planned surgery  2. Essential hypertension  -     METABOLIC PANEL, COMPREHENSIVE; Future  -     LIPID PANEL; Future    3. Prediabetes  -     METABOLIC PANEL, COMPREHENSIVE; Future  -     HEMOGLOBIN A1C WITH EAG; Future    4. Mixed hyperlipidemia    5.  Severe obesity with body mass index (BMI) of 35.0 to 39.9 with serious comorbidity ([address]      Ff-up in 3 months or sooner prn, fasting labs prior, plan on [address] then     Feli Friedman MD   7/15/2021

## 2021-07-15 NOTE — PATIENT INSTRUCTIONS
A Healthy Heart: Care Instructions  Your Care Instructions     Coronary artery disease, also called heart disease, occurs when a substance called plaque builds up in the vessels that supply oxygen-rich blood to your heart muscle. This can narrow the blood vessels and reduce blood flow. A heart attack happens when blood flow is completely blocked. A high-fat diet, smoking, and other factors increase the risk of heart disease. Your doctor has found that you have a chance of having heart disease. You can do lots of things to keep your heart healthy. It may not be easy, but you can change your diet, exercise more, and quit smoking. These steps really work to lower your chance of heart disease. Follow-up care is a key part of your treatment and safety. Be sure to make and go to all appointments, and call your doctor if you are having problems. It's also a good idea to know your test results and keep a list of the medicines you take. How can you care for yourself at home? Diet    · Use less salt when you cook and eat. This helps lower your blood pressure. Taste food before salting. Add only a little salt when you think you need it. With time, your taste buds will adjust to less salt.     · Eat fewer snack items, fast foods, canned soups, and other high-salt, high-fat, processed foods.     · Read food labels and try to avoid saturated and trans fats. They increase your risk of heart disease by raising cholesterol levels.     · Limit the amount of solid fat-butter, margarine, and shortening-you eat. Use olive, peanut, or canola oil when you cook. Bake, broil, and steam foods instead of frying them.     · Eat a variety of fruit and vegetables every day. Dark green, deep orange, red, or yellow fruits and vegetables are especially good for you. Examples include spinach, carrots, peaches, and berries.     · Foods high in fiber can reduce your cholesterol and provide important vitamins and minerals.  High-fiber foods include whole-grain cereals and breads, oatmeal, beans, brown rice, citrus fruits, and apples.     · Eat lean proteins. Heart-healthy proteins include seafood, lean meats and poultry, eggs, beans, peas, nuts, seeds, and soy products.     · Limit drinks and foods with added sugar. These include candy, desserts, and soda pop. Lifestyle changes    · If your doctor recommends it, get more exercise. Walking is a good choice. Bit by bit, increase the amount you walk every day. Try for at least 30 minutes on most days of the week. You also may want to swim, bike, or do other activities.     · Do not smoke. If you need help quitting, talk to your doctor about stop-smoking programs and medicines. These can increase your chances of quitting for good. Quitting smoking may be the most important step you can take to protect your heart. It is never too late to quit.     · Limit alcohol to 2 drinks a day for men and 1 drink a day for women. Too much alcohol can cause health problems.     · Manage other health problems such as diabetes, high blood pressure, and high cholesterol. If you think you may have a problem with alcohol or drug use, talk to your doctor. Medicines    · Take your medicines exactly as prescribed. Call your doctor if you think you are having a problem with your medicine.     · If your doctor recommends aspirin, take the amount directed each day. Make sure you take aspirin and not another kind of pain reliever, such as acetaminophen (Tylenol). When should you call for help? Call 911 if you have symptoms of a heart attack. These may include:    · Chest pain or pressure, or a strange feeling in the chest.     · Sweating.     · Shortness of breath.     · Pain, pressure, or a strange feeling in the back, neck, jaw, or upper belly or in one or both shoulders or arms.     · Lightheadedness or sudden weakness.     · A fast or irregular heartbeat.    After you call 911, the  may tell you to chew 1 adult-strength or 2 to 4 low-dose aspirin. Wait for an ambulance. Do not try to drive yourself. Watch closely for changes in your health, and be sure to contact your doctor if you have any problems. Where can you learn more? Go to http://www.clark.com/  Enter F075 in the search box to learn more about \"A Healthy Heart: Care Instructions. \"  Current as of: August 31, 2020               Content Version: 12.8  © 2006-2021 [employer]. Care instructions adapted under license by [employer] (which disclaims liability or warranty for this information). If you have questions about a medical condition or this instruction, always ask your healthcare professional. [address] any warranty or liability for your use of this information.

## 2021-07-15 NOTE — PROGRESS NOTES
1. Have you been to the ER, urgent care clinic since your last visit? Hospitalized since your last visit? No    2. Have you seen or consulted any other health care providers outside of the [address] [place] [address] since your last visit? Include any pap smears or colon screening.  No

## 2021-09-27 ENCOUNTER — HOSPITAL ENCOUNTER (OUTPATIENT)
Dept: LAB | Age: 76
Discharge: HOME | End: 2021-09-27
Payer: MEDICARE

## 2021-09-27 DIAGNOSIS — I10 ESSENTIAL HYPERTENSION: ICD-10-CM

## 2021-09-27 DIAGNOSIS — R73.03 PREDIABETES: ICD-10-CM

## 2021-09-27 LAB
ALBUMIN SERPL-MCNC: 3.7 G/DL (ref 3.4–5)
ALBUMIN/GLOB SERPL: 0.8 {RATIO} (ref 0.8–1.7)
ALP SERPL-CCNC: 81 U/L (ref 45–117)
ALT SERPL-CCNC: 26 U/L (ref 13–56)
ANION GAP SERPL CALC-SCNC: 5 MMOL/L (ref 3–18)
AST SERPL-CCNC: 21 U/L (ref 10–38)
BILIRUB SERPL-MCNC: 0.5 MG/DL (ref 0.2–1)
BUN SERPL-MCNC: 12 MG/DL (ref 7–18)
BUN/CREAT SERPL: 14 (ref 12–20)
CALCIUM SERPL-MCNC: 9.4 MG/DL (ref 8.5–10.1)
CHLORIDE SERPL-SCNC: 103 MMOL/L (ref 100–111)
CHOLEST SERPL-MCNC: 257 MG/DL
CO2 SERPL-SCNC: 32 MMOL/L (ref 21–32)
CREAT SERPL-MCNC: 0.84 MG/DL (ref 0.6–1.3)
EST. AVERAGE GLUCOSE BLD GHB EST-MCNC: 117 MG/DL
GLOBULIN SER CALC-MCNC: 4.4 G/DL (ref 2–4)
GLUCOSE SERPL-MCNC: 105 MG/DL (ref 74–99)
HBA1C MFR BLD: 5.7 % (ref 4.2–5.6)
HDLC SERPL-MCNC: 51 MG/DL (ref 40–60)
HDLC SERPL: 5 {RATIO} (ref 0–5)
LDLC SERPL CALC-MCNC: 180.8 MG/DL (ref 0–100)
LIPID PROFILE,FLP: ABNORMAL
POTASSIUM SERPL-SCNC: 4 MMOL/L (ref 3.5–5.5)
PROT SERPL-MCNC: 8.1 G/DL (ref 6.4–8.2)
SODIUM SERPL-SCNC: 140 MMOL/L (ref 136–145)
TRIGL SERPL-MCNC: 126 MG/DL (ref ?–150)
VLDLC SERPL CALC-MCNC: 25.2 MG/DL

## 2021-09-27 PROCEDURE — 83036 HEMOGLOBIN GLYCOSYLATED A1C: CPT

## 2021-09-27 PROCEDURE — 36415 COLL VENOUS BLD VENIPUNCTURE: CPT

## 2021-09-27 PROCEDURE — 80053 COMPREHEN METABOLIC PANEL: CPT

## 2021-09-27 PROCEDURE — 80061 LIPID PANEL: CPT

## 2021-11-11 DIAGNOSIS — E11.9 DIABETES MELLITUS TYPE 2, DIET-CONTROLLED (HCC): ICD-10-CM

## 2021-11-12 RX ORDER — BLOOD SUGAR DIAGNOSTIC
STRIP MISCELLANEOUS
Qty: 100 STRIP | Refills: 3 | Status: SHIPPED | OUTPATIENT
Start: 2021-11-12 | End: 2022-09-27

## 2021-11-12 RX ORDER — LANCETS
EACH MISCELLANEOUS
Qty: 100 EACH | Refills: 3 | Status: SHIPPED | OUTPATIENT
Start: 2021-11-12

## 2021-11-30 ENCOUNTER — VIRTUAL VISIT (OUTPATIENT)
Dept: FAMILY MEDICINE CLINIC | Age: 76
End: 2021-11-30
Payer: MEDICARE

## 2021-11-30 DIAGNOSIS — R73.01 IFG (IMPAIRED FASTING GLUCOSE): Primary | ICD-10-CM

## 2021-11-30 DIAGNOSIS — M85.80 OSTEOPENIA, UNSPECIFIED LOCATION: ICD-10-CM

## 2021-11-30 DIAGNOSIS — K21.9 GASTROESOPHAGEAL REFLUX DISEASE, UNSPECIFIED WHETHER ESOPHAGITIS PRESENT: ICD-10-CM

## 2021-11-30 DIAGNOSIS — F41.9 ANXIETY: ICD-10-CM

## 2021-11-30 DIAGNOSIS — E78.00 PURE HYPERCHOLESTEROLEMIA: ICD-10-CM

## 2021-11-30 DIAGNOSIS — I10 WHITE COAT SYNDROME WITH HYPERTENSION: ICD-10-CM

## 2021-11-30 PROCEDURE — 99442 PR PHYS/QHP TELEPHONE EVALUATION 11-20 MIN: CPT | Performed by: FAMILY MEDICINE

## 2021-11-30 NOTE — PROGRESS NOTES
Rafael Purdy is a 64 y.o. female, evaluated via audio-only technology on 11/30/2021 for ff-up. Assessment & Plan:   Diagnoses and all orders for this visit:    1. Gastroesophageal reflux disease, unspecified whether esophagitis present  Stable  Cont prilosec    2. Osteopenia, unspecified location  Cont ca/vit d/wt bearing exercises  dexa update 2023    3. Anxiety  stable  Responding to prn vistaril    4. Essential hypertension  Controlled  Cont hctdz  Cmp/lipid panel prior to next visit    5. IFG (impaired fasting glucose)  Stable  Monitoring, weight loss encouraged    6. Mixed hyperlipidemia  Intolerant to statin  Low fat diet, monitoring    7. White coat syndrome with hypertension    Ff-up in 3 months, plan on MWV then, in person    12  Subjective:     Routine ff-up   Anxiety- reports good response to prn vistaril    HTN with white coat-  Taking  HCTZ. Home BPs normotensive per pt. She reports being on multiple hypertensives in the past, and cut out BP meds due to hypotension/ and  white coat hypertension. We have compared BP monitors in the past and consistent.        HL/prediabetes-reviewed labs,  Previously  intolerant to with muscle aches. does not want to try any other statin or zetia, she continues to take fish oil. [facility] Previous MRI 2014 showing minimal presumed chronic microvascular ischemic changes.      GERD-currently on prilosec. EGD 11/2018 reviewed showing hiatal hernia, esophageal dilation. GI following dr. Krunal Wright, will request records            Prior to Admission medications    Medication Sig Start Date End Date Taking?  Authorizing Provider   lancets (Accu-Chek Softclix Lancets) misc TEST BLOOD SUGAR EVERY DAY 11/12/21  Yes Amanda Reagan MD   glucose blood VI test strips (Accu-Chek Zulma Plus test strp) strip TEST BLOOD SUGAR EVERY DAY 11/12/21  Yes Amanda Reagan MD   meclizine (ANTIVERT) 25 mg tablet  5/25/21  Yes Provider, Historical   hydrOXYzine pamoate (VISTARIL) 50 mg capsule Take 1 Cap by mouth three (3) times daily as needed for Anxiety or Sleep. 3/1/21  Yes Angelita Mclaughlin MD   hydroCHLOROthiazide (HYDRODIURIL) 25 mg tablet TAKE 1 TABLET EVERY DAY 2/11/21  Yes Caitlin Hartman MD   Blood-Glucose Meter monitoring kit Accu-chek Zulma plus meter. Check fasting glucose daily. DX: E11.9 10/19/20  Yes Caitlin Hartman MD   omeprazole (PRILOSEC) 40 mg capsule Take 1 Cap by mouth daily. 10/5/20  Yes Caitlin Hartman MD   OTHER OTC ear drop for pain   Yes Provider, Historical   fluticasone (FLONASE) 50 mcg/actuation nasal spray  8/25/15  Yes Provider, Historical   omega-3 fatty acids-vitamin e (FISH OIL) 1,000 mg Cap Take 1 Cap by mouth daily. Yes Provider, Historical   multivitamin (ONE A DAY) tablet Take 1 Tab by mouth daily. Yes Provider, Historical   cetirizine (ZYRTEC) 10 mg tablet Take 1 Tab by mouth daily.  4/19/16 11/30/21  Caitlin Hartman MD     Patient Active Problem List    Diagnosis Date Noted    Hiatal hernia 03/21/2019    White coat syndrome with hypertension 06/19/2018    Adenomatous polyp of colon 06/19/2018    Advance care planning 03/19/2018    Severe obesity with body mass index (BMI) of 35.0 to 39.9 with serious comorbidity ([facility] [address] 12/01/2017    Chronic scapular pain-intermittent 06/12/2017    Advance directive discussed with patient-DARIUSZ Casey 03/27/2017    IFG (impaired fasting glucose) 02/15/2012    Hyperlipidemia 02/15/2012    GERD (gastroesophageal reflux disease) 02/15/2012    Obesity 02/15/2012    Carotid stenosis, left     Osteopenia     Anxiety      Current Outpatient Medications   Medication Sig Dispense Refill    lancets (Accu-Chek Softclix Lancets) misc TEST BLOOD SUGAR EVERY  Each 3    glucose blood VI test strips (Accu-Chek Uzlma Plus test strp) strip TEST BLOOD SUGAR EVERY  Strip 3    meclizine (ANTIVERT) 25 mg tablet       hydrOXYzine pamoate (VISTARIL) 50 mg capsule Take 1 Cap by mouth three (3) times daily as needed for Anxiety or Sleep. 90 Cap 0    hydroCHLOROthiazide (HYDRODIURIL) 25 mg tablet TAKE 1 TABLET EVERY DAY 90 Tab 1    Blood-Glucose Meter monitoring kit Accu-chek Zulma plus meter. Check fasting glucose daily. DX: E11.9 1 Kit 0    omeprazole (PRILOSEC) 40 mg capsule Take 1 Cap by mouth daily. 90 Cap 3    OTHER OTC ear drop for pain      fluticasone (FLONASE) 50 mcg/actuation nasal spray       omega-3 fatty acids-vitamin e (FISH OIL) 1,000 mg Cap Take 1 Cap by mouth daily.  multivitamin (ONE A DAY) tablet Take 1 Tab by mouth daily. Allergies   Allergen Reactions    Bystolic [Nebivolol] Other (comments)     Leg cramps    Norvasc [Amlodipine] Swelling    Pravachol [Pravastatin] Myalgia     Past Medical History:   Diagnosis Date    Alpha thalassemia ([facility] [address] 2/15/2012    Anxiety     BMI 40.0-44.9, adult ([address] 5/10/2017    Breast tumor     benign cyst    Carotid stenosis, left     LICA <07%    Esophageal stricture     dr. Monae Class, s/p balloon dilation 9/2016    Gastric polyps     GERD (gastroesophageal reflux disease)     endoscopy 7/08- hiatal hernia/gastric polyps, esophagitis 9/2016 on prilosec/carafate    Headache(784.0)     Hx of colonoscopy 05/26/2015    tubular adenoma, rpt 3 years 2018 dr. Hoang Kaplan Hypertension     nuclear stress test neg 1/2009    IFG (impaired fasting glucose)     Osteopenia     Reflux esophagitis 11/06/2018    EGD, dr. Pati De Leon     Past Surgical History:   Procedure Laterality Date    HX BREAST BIOPSY  1978    bilateral breasts    HX HYSTERECTOMY       Family History   Problem Relation Age of Onset    Cancer Mother      Social History     Tobacco Use    Smoking status: Never Smoker    Smokeless tobacco: Never Used   Substance Use Topics    Alcohol use: No     Comment: \"Often\"       ROS    No flowsheet data found.      [facility], who was evaluated through a patient-initiated, synchronous (real-time) audio only encounter, and/or her healthcare decision maker, is aware that it is a billable service, with coverage as determined by her insurance carrier. She provided verbal consent to proceed: Yes. She has not had a related appointment within my department in the past 7 days or scheduled within the next 24 hours.       Total Time: 21-30mins    Florida Crow MD

## 2021-11-30 NOTE — PATIENT INSTRUCTIONS
DASH Diet: Care Instructions  Your Care Instructions     The DASH diet is an eating plan that can help lower your blood pressure. DASH stands for Dietary Approaches to Stop Hypertension. Hypertension is high blood pressure. The DASH diet focuses on eating foods that are high in calcium, potassium, and magnesium. These nutrients can lower blood pressure. The foods that are highest in these nutrients are fruits, vegetables, low-fat dairy products, nuts, seeds, and legumes. But taking calcium, potassium, and magnesium supplements instead of eating foods that are high in those nutrients does not have the same effect. The DASH diet also includes whole grains, fish, and poultry. The DASH diet is one of several lifestyle changes your doctor may recommend to lower your high blood pressure. Your doctor may also want you to decrease the amount of sodium in your diet. Lowering sodium while following the DASH diet can lower blood pressure even further than just the DASH diet alone. Follow-up care is a key part of your treatment and safety. Be sure to make and go to all appointments, and call your doctor if you are having problems. It's also a good idea to know your test results and keep a list of the medicines you take. How can you care for yourself at home? Following the DASH diet  · Eat 4 to 5 servings of fruit each day. A serving is 1 medium-sized piece of fruit, ½ cup chopped or canned fruit, 1/4 cup dried fruit, or 4 ounces (½ cup) of fruit juice. Choose fruit more often than fruit juice. · Eat 4 to 5 servings of vegetables each day. A serving is 1 cup of lettuce or raw leafy vegetables, ½ cup of chopped or cooked vegetables, or 4 ounces (½ cup) of vegetable juice. Choose vegetables more often than vegetable juice. · Get 2 to 3 servings of low-fat and fat-free dairy each day. A serving is 8 ounces of milk, 1 cup of yogurt, or 1 ½ ounces of cheese. · Eat 6 to 8 servings of grains each day.  A serving is 1 slice of bread, 1 ounce of dry cereal, or ½ cup of cooked rice, pasta, or cooked cereal. Try to choose whole-grain products as much as possible. · Limit lean meat, poultry, and fish to 2 servings each day. A serving is 3 ounces, about the size of a deck of cards. · Eat 4 to 5 servings of nuts, seeds, and legumes (cooked dried beans, lentils, and split peas) each week. A serving is 1/3 cup of nuts, 2 tablespoons of seeds, or ½ cup of cooked beans or peas. · Limit fats and oils to 2 to 3 servings each day. A serving is 1 teaspoon of vegetable oil or 2 tablespoons of salad dressing. · Limit sweets and added sugars to 5 servings or less a week. A serving is 1 tablespoon jelly or jam, ½ cup sorbet, or 1 cup of lemonade. · Eat less than 2,300 milligrams (mg) of sodium a day. If you limit your sodium to 1,500 mg a day, you can lower your blood pressure even more. · Be aware that all of these are the suggested number of servings for people who eat 1,800 to 2,000 calories a day. Your recommended number of servings may be different if you need more or fewer calories. Tips for success  · Start small. Do not try to make dramatic changes to your diet all at once. You might feel that you are missing out on your favorite foods and then be more likely to not follow the plan. Make small changes, and stick with them. Once those changes become habit, add a few more changes. · Try some of the following:  ? Make it a goal to eat a fruit or vegetable at every meal and at snacks. This will make it easy to get the recommended amount of fruits and vegetables each day. ? Try yogurt topped with fruit and nuts for a snack or healthy dessert. ? Add lettuce, tomato, cucumber, and onion to sandwiches. ? Combine a ready-made pizza crust with low-fat mozzarella cheese and lots of vegetable toppings. Try using tomatoes, squash, spinach, broccoli, carrots, cauliflower, and onions. ?  Have a variety of cut-up vegetables with a low-fat dip as an appetizer instead of chips and dip. ? Sprinkle sunflower seeds or chopped almonds over salads. Or try adding chopped walnuts or almonds to cooked vegetables. ? Try some vegetarian meals using beans and peas. Add garbanzo or kidney beans to salads. Make burritos and tacos with mashed elizabeth beans or black beans. Where can you learn more? Go to http://www.clark.com/  Enter H967 in the search box to learn more about \"DASH Diet: Care Instructions. \"  Current as of: April 29, 2021               Content Version: 13.0  © 5702-6317 [employer]. Care instructions adapted under license by [employer] (which disclaims liability or warranty for this information). If you have questions about a medical condition or this instruction, always ask your healthcare professional. [address] any warranty or liability for your use of this information.

## 2021-11-30 NOTE — PROGRESS NOTES
Chief Complaint   Patient presents with    Annual Wellness Visit     [facility] [place].    1. \"Have you been to the ER, urgent care clinic since your last visit? Hospitalized since your last visit? \" No    2. \"Have you seen or consulted any other health care providers outside of the [address] [place] [address] since your last visit? \" No     3. For patients aged 39-70: Has the patient had a colonoscopy / FIT/ Cologuard? NA based on age or sex     If the patient is female:    3. For patients aged 41-77: Has the patient had a mammogram within the past 2 years? NA based on age or sex    11. For patients aged 21-65: Has the patient had a pap smear?  NA based on age or sex

## 2022-02-24 ENCOUNTER — HOSPITAL ENCOUNTER (OUTPATIENT)
Dept: LAB | Age: 77
Discharge: HOME | End: 2022-02-24
Payer: MEDICARE

## 2022-02-24 DIAGNOSIS — R73.01 IFG (IMPAIRED FASTING GLUCOSE): ICD-10-CM

## 2022-02-24 DIAGNOSIS — I10 WHITE COAT SYNDROME WITH HYPERTENSION: ICD-10-CM

## 2022-02-24 LAB
ALBUMIN SERPL-MCNC: 3.5 G/DL (ref 3.4–5)
ALBUMIN/GLOB SERPL: 0.8 {RATIO} (ref 0.8–1.7)
ALP SERPL-CCNC: 83 U/L (ref 45–117)
ALT SERPL-CCNC: 26 U/L (ref 13–56)
ANION GAP SERPL CALC-SCNC: 2 MMOL/L (ref 3–18)
AST SERPL-CCNC: 18 U/L (ref 10–38)
BILIRUB SERPL-MCNC: 0.7 MG/DL (ref 0.2–1)
BUN SERPL-MCNC: 11 MG/DL (ref 7–18)
BUN/CREAT SERPL: 13 (ref 12–20)
CALCIUM SERPL-MCNC: 9.2 MG/DL (ref 8.5–10.1)
CHLORIDE SERPL-SCNC: 103 MMOL/L (ref 100–111)
CHOLEST SERPL-MCNC: 235 MG/DL
CO2 SERPL-SCNC: 33 MMOL/L (ref 21–32)
CREAT SERPL-MCNC: 0.82 MG/DL (ref 0.6–1.3)
GLOBULIN SER CALC-MCNC: 4.4 G/DL (ref 2–4)
GLUCOSE SERPL-MCNC: 103 MG/DL (ref 74–99)
HDLC SERPL-MCNC: 50 MG/DL (ref 40–60)
HDLC SERPL: 4.7 {RATIO} (ref 0–5)
LDLC SERPL CALC-MCNC: 155.6 MG/DL (ref 0–100)
LIPID PROFILE,FLP: ABNORMAL
POTASSIUM SERPL-SCNC: 3.7 MMOL/L (ref 3.5–5.5)
PROT SERPL-MCNC: 7.9 G/DL (ref 6.4–8.2)
SODIUM SERPL-SCNC: 138 MMOL/L (ref 136–145)
TRIGL SERPL-MCNC: 147 MG/DL (ref ?–150)
VLDLC SERPL CALC-MCNC: 29.4 MG/DL

## 2022-02-24 PROCEDURE — 80061 LIPID PANEL: CPT

## 2022-02-24 PROCEDURE — 36415 COLL VENOUS BLD VENIPUNCTURE: CPT

## 2022-02-24 PROCEDURE — 80053 COMPREHEN METABOLIC PANEL: CPT

## 2022-03-03 ENCOUNTER — OFFICE VISIT (OUTPATIENT)
Dept: FAMILY MEDICINE CLINIC | Age: 77
End: 2022-03-03
Payer: MEDICARE

## 2022-03-03 VITALS
DIASTOLIC BLOOD PRESSURE: 70 MMHG | WEIGHT: 183.6 LBS | HEART RATE: 72 BPM | TEMPERATURE: 97.2 F | SYSTOLIC BLOOD PRESSURE: 154 MMHG | OXYGEN SATURATION: 98 % | BODY MASS INDEX: 37.01 KG/M2 | HEIGHT: 59 IN | RESPIRATION RATE: 16 BRPM

## 2022-03-03 DIAGNOSIS — R73.01 IFG (IMPAIRED FASTING GLUCOSE): Primary | ICD-10-CM

## 2022-03-03 DIAGNOSIS — M85.80 OSTEOPENIA, UNSPECIFIED LOCATION: ICD-10-CM

## 2022-03-03 DIAGNOSIS — R77.1 ELEVATED SERUM GLOBULIN LEVEL: ICD-10-CM

## 2022-03-03 DIAGNOSIS — E78.2 MIXED HYPERLIPIDEMIA: ICD-10-CM

## 2022-03-03 DIAGNOSIS — Z00.00 MEDICARE ANNUAL WELLNESS VISIT, SUBSEQUENT: ICD-10-CM

## 2022-03-03 DIAGNOSIS — K21.9 GASTROESOPHAGEAL REFLUX DISEASE, UNSPECIFIED WHETHER ESOPHAGITIS PRESENT: ICD-10-CM

## 2022-03-03 DIAGNOSIS — E78.00 PURE HYPERCHOLESTEROLEMIA: ICD-10-CM

## 2022-03-03 DIAGNOSIS — I10 WHITE COAT SYNDROME WITH HYPERTENSION: ICD-10-CM

## 2022-03-03 DIAGNOSIS — E66.01 SEVERE OBESITY (BMI 35.0-39.9) WITH COMORBIDITY (HCC): ICD-10-CM

## 2022-03-03 DIAGNOSIS — R73.03 PREDIABETES: ICD-10-CM

## 2022-03-03 DIAGNOSIS — I10 ESSENTIAL HYPERTENSION: ICD-10-CM

## 2022-03-03 DIAGNOSIS — F41.9 ANXIETY: ICD-10-CM

## 2022-03-03 PROCEDURE — G8754 DIAS BP LESS 90: HCPCS | Performed by: FAMILY MEDICINE

## 2022-03-03 PROCEDURE — G8510 SCR DEP NEG, NO PLAN REQD: HCPCS | Performed by: FAMILY MEDICINE

## 2022-03-03 PROCEDURE — 1090F PRES/ABSN URINE INCON ASSESS: CPT | Performed by: FAMILY MEDICINE

## 2022-03-03 PROCEDURE — G0439 PPPS, SUBSEQ VISIT: HCPCS | Performed by: FAMILY MEDICINE

## 2022-03-03 PROCEDURE — G8753 SYS BP > OR = 140: HCPCS | Performed by: FAMILY MEDICINE

## 2022-03-03 PROCEDURE — G8399 PT W/DXA RESULTS DOCUMENT: HCPCS | Performed by: FAMILY MEDICINE

## 2022-03-03 PROCEDURE — G8536 NO DOC ELDER MAL SCRN: HCPCS | Performed by: FAMILY MEDICINE

## 2022-03-03 PROCEDURE — G8417 CALC BMI ABV UP PARAM F/U: HCPCS | Performed by: FAMILY MEDICINE

## 2022-03-03 PROCEDURE — G8427 DOCREV CUR MEDS BY ELIG CLIN: HCPCS | Performed by: FAMILY MEDICINE

## 2022-03-03 PROCEDURE — 1101F PT FALLS ASSESS-DOCD LE1/YR: CPT | Performed by: FAMILY MEDICINE

## 2022-03-03 PROCEDURE — 99214 OFFICE O/P EST MOD 30 MIN: CPT | Performed by: FAMILY MEDICINE

## 2022-03-03 RX ORDER — HYDROCHLOROTHIAZIDE 25 MG/1
25 TABLET ORAL DAILY
Qty: 90 TABLET | Refills: 1 | Status: SHIPPED | OUTPATIENT
Start: 2022-03-03 | End: 2022-09-27

## 2022-03-03 NOTE — PROGRESS NOTES
This is the Subsequent Medicare Annual Wellness Exam, performed 12 months or more after the Initial AWV or the last Subsequent AWV    I have reviewed the patient's medical history in detail and updated the computerized patient record. Assessment/Plan   Education and counseling provided:  Are appropriate based on today's review and evaluation  Cardiovascular screening blood test 2/2022  Bone mass measurement (DEXA) 2021 update 5/2023  Screening for glaucoma- annually      Depression Risk Factor Screening     3 most recent PHQ Screens 3/3/2022   PHQ Not Done -   Little interest or pleasure in doing things Not at all   Feeling down, depressed, irritable, or hopeless Several days   Total Score PHQ 2 1       Alcohol & Drug Abuse Risk Screen    Do you average more than 1 drink per night or more than 7 drinks a week:  No    On any one occasion in the past three months have you have had more than 3 drinks containing alcohol:  No          Functional Ability and Level of Safety    Hearing: Hearing is good. Activities of Daily Living: The home contains: no safety equipment. Patient does total self care      Ambulation: with mild difficulty     Fall Risk:  Fall Risk Assessment, last 12 mths 3/3/2022   Able to walk? Yes   Fall in past 12 months? 0   Do you feel unsteady? 0   Are you worried about falling 0   Number of falls in past 12 months -   Fall with injury?  -      Abuse Screen:  Patient is not abused       Cognitive Screening    Has your family/caregiver stated any concerns about your memory: no         Health Maintenance Due     Health Maintenance Due   Topic Date Due    Hepatitis C Screening  Never done    Pneumococcal 65+ years (2 of 2 - PPSV23) 10/19/2016    Flu Vaccine (1) 09/01/2021    COVID-19 Vaccine (3 - Booster for Pfizer series) 02/06/2022       Patient Care Team   Patient Care Team:  Salazar Wilkins MD as PCP - General (Family Medicine)  Salazar Wilkins MD as PCP - [place] Provider  Ifeanyi Nguyen MD (Surgery)  Janeth Briggs MD (Physical Medicine and Rehabilitation)  Melissa Barajas MD as Surgeon (Surgical Oncology)  Sepideh Mohan MD (Orthopedic Surgery)  Elsa Chisholm DO (Optometry)  Osbaldo Murphy MD (Gastroenterology)  Ginna Gaitan MD (Gastroenterology)  Demond Callahan MD (Gastroenterology)  Ashli Addison MD (Sleep Medicine)  Vipin Vogel MD (Vascular Surgery)    History     Patient Active Problem List   Diagnosis Code    IFG (impaired fasting glucose) R73.01    Hyperlipidemia E78.5    GERD (gastroesophageal reflux disease) K21.9    Obesity E66.9    Carotid stenosis, left I65.22    Osteopenia M85.80    Anxiety F41.9    Advance directive discussed with patient-DARIUSZ Caro Z71.89    Chronic scapular pain-intermittent M89.8X1, G89.29    Severe obesity with body mass index (BMI) of 35.0 to 39.9 with serious comorbidity ([facility] E66.01    Advance care planning Z71.89    White coat syndrome with hypertension I10    Adenomatous polyp of colon D12.6    Hiatal hernia K44.9     Past Medical History:   Diagnosis Date    Alpha thalassemia ([facility] 2/15/2012    Anxiety     BMI 40.0-44.9, adult ([facility] 5/10/2017    Breast tumor     benign cyst    Carotid stenosis, left     LICA <99%    Esophageal stricture     dr. Sara Charles, s/p balloon dilation 9/2016    Gastric polyps     GERD (gastroesophageal reflux disease)     endoscopy 7/08- hiatal hernia/gastric polyps, esophagitis 9/2016 on prilosec/carafate    Headache(784.0)     Hx of colonoscopy 05/26/2015    tubular adenoma, rpt 3 years 2018 dr. Tati Goldsmith Hypertension     nuclear stress test neg 1/2009    IFG (impaired fasting glucose)     Osteopenia     Reflux esophagitis 11/06/2018    EGD, dr. Miller Bamberger      Past Surgical History:   Procedure Laterality Date    HX BREAST BIOPSY  1978    bilateral breasts    HX HYSTERECTOMY       Current Outpatient Medications   Medication Sig Dispense Refill    hydroCHLOROthiazide (HYDRODIURIL) 25 mg tablet Take 1 Tablet by mouth daily. 90 Tablet 1    lancets (Accu-Chek Softclix Lancets) misc TEST BLOOD SUGAR EVERY  Each 3    glucose blood VI test strips (Accu-Chek Zulma Plus test strp) strip TEST BLOOD SUGAR EVERY  Strip 3    meclizine (ANTIVERT) 25 mg tablet       hydrOXYzine pamoate (VISTARIL) 50 mg capsule Take 1 Cap by mouth three (3) times daily as needed for Anxiety or Sleep. 90 Cap 0    Blood-Glucose Meter monitoring kit Accu-chek Zulma plus meter. Check fasting glucose daily. DX: E11.9 1 Kit 0    omeprazole (PRILOSEC) 40 mg capsule Take 1 Cap by mouth daily. 90 Cap 3    OTHER OTC ear drop for pain      fluticasone (FLONASE) 50 mcg/actuation nasal spray       omega-3 fatty acids-vitamin e (FISH OIL) 1,000 mg Cap Take 1 Cap by mouth daily.  multivitamin (ONE A DAY) tablet Take 1 Tab by mouth daily. Allergies   Allergen Reactions    Bystolic [Nebivolol] Other (comments)     Leg cramps    Norvasc [Amlodipine] Swelling    Pravachol [Pravastatin] Myalgia       Family History   Problem Relation Age of Onset    Cancer Mother      Social History     Tobacco Use    Smoking status: Never Smoker    Smokeless tobacco: Never Used   Substance Use Topics    Alcohol use: No     Comment: \"Often\"         MD Sergio Branham, 74 y.o.,  female    SUBJECTIVE  Routine ff-up    HTN with white coat-  Taking  HCTZ. Home BP readings 120/80's. She reports being on multiple hypertensives in the past, and cut out BP meds due to hypotension/ and  white coat hypertension. We have compared BP monitors in the past and consistent. HL/prediabetes-reviewed ldl 188>150 with diteray changes. says intolerant to lipitor/pravachol pt reports muscle aches. does not want to try any other statin. Previous MRI 2014 showing minimal presumed chronic microvascular ischemic changes.      GERD-currently on prilosec, reports better response on nexium however too costly. Recommended bulk stores as option,  EGD 11/18 reviewed showing hiatal hernia, esophageal dilation. GI following. Reviewed crcs 10/2021 dr. Duarte Ink polyp no further screening indicated    Anxiety- reports response to vistaril prn    ROS:  See HPI, all others negative        Patient Active Problem List   Diagnosis Code    IFG (impaired fasting glucose) R73.01    Hyperlipidemia E78.5    GERD (gastroesophageal reflux disease) K21.9    Alpha thalassemia ([facility]) D56.0    Obesity E66.9    Carotid stenosis, left I65.22    Osteopenia M85.80    Anxiety F41.9    Hypertensive retinopathy H35.039    Essential hypertension I10    Advance directive discussed with patient-DARIUSZ Aguirre Lax Z71.89    Chronic scapular pain-intermittent M89.8X1, G89.29    Severe obesity (BMI 35.0-39.9) ([facility]) E66.01       Current Outpatient Medications:     hydroCHLOROthiazide (HYDRODIURIL) 25 mg tablet, Take 1 Tablet by mouth daily. , Disp: 90 Tablet, Rfl: 1    lancets (Accu-Chek Softclix Lancets) misc, TEST BLOOD SUGAR EVERY DAY, Disp: 100 Each, Rfl: 3    glucose blood VI test strips (Accu-Chek Zulma Plus test strp) strip, TEST BLOOD SUGAR EVERY DAY, Disp: 100 Strip, Rfl: 3    meclizine (ANTIVERT) 25 mg tablet, , Disp: , Rfl:     hydrOXYzine pamoate (VISTARIL) 50 mg capsule, Take 1 Cap by mouth three (3) times daily as needed for Anxiety or Sleep., Disp: 90 Cap, Rfl: 0    Blood-Glucose Meter monitoring kit, Accu-chek Zulma plus meter. Check fasting glucose daily. DX: E11.9, Disp: 1 Kit, Rfl: 0    omeprazole (PRILOSEC) 40 mg capsule, Take 1 Cap by mouth daily. , Disp: 90 Cap, Rfl: 3    OTHER, OTC ear drop for pain, Disp: , Rfl:     fluticasone (FLONASE) 50 mcg/actuation nasal spray, , Disp: , Rfl:     omega-3 fatty acids-vitamin e (FISH OIL) 1,000 mg Cap, Take 1 Cap by mouth daily. , Disp: , Rfl:     multivitamin (ONE A DAY) tablet, Take 1 Tab by mouth daily.   , Disp: , Rfl:     Allergies Allergen Reactions    Bystolic [Nebivolol] Other (comments)     Leg cramps    Norvasc [Amlodipine] Swelling    Pravachol [Pravastatin] Myalgia       Past Medical History:   Diagnosis Date    [facility]sem[facility] 2/15/2012    Anxiety     BMI 40.0-44.9, adult ([facility] 5/10/2017    Breast tumor     benign cyst    Carotid stenosis, left     LICA <39%    Esophageal stricture     dr. Cast[place]an [place], s/p balloon dilation 9/2016    Gastric polyps     GERD (gastroesophageal reflux disease)     endoscopy 7/08- hiatal hernia/gastric polyps, esophagitis 9/2016 on prilosec/carafate    Headache(784.0)     Hypertension     nuclear stress test neg 1/2009    IFG (impaired fasting glucose)     Osteopenia        Social History     Social History    Marital status:      Spouse name: N/A    Number of children: N/A    Years of education: N/A     Occupational History    Not on file.      Social History Main Topics    Smoking status: Never Smoker    Smokeless tobacco: Never Used    Alcohol use No      Comment: \"Often\"    Drug use: No    Sexual activity: Yes     Partners: Male     Other Topics Concern    Not on file     Social History Narrative       Family History   Problem Relation Age of Onset    Cancer Mother          OBJECTIVE    Physical Exam:     Visit Vitals  BP (!) 156/90 (BP 1 Location: Left upper arm, BP Patient Position: Sitting, BP Cuff Size: Adult)   Pulse 72   Temp 97.2 °F (36.2 °C) (Temporal)   Resp 16   Ht 4' 11\" (1.499 m)   Wt 183 lb 9.6 oz (83.3 kg)   SpO2 98%   BMI 37.08 kg/m²         General: alert, n no apparent distress or pain  CVS: normal rate, regular rhythm, distinct S1 and S2  Lungs:clear to ausculation bilaterally, no crackles, wheezing or rhonchi noted  Abdomen: soft, NT, ND  Extremities: no edema  Skin: warm, no lesions, rashes noted  Psych:  mood and affect normal  Results for orders placed or performed during the hospital encounter of 71/70/41   METABOLIC PANEL, COMPREHENSIVE Result Value Ref Range    Sodium 138 136 - 145 mmol/L    Potassium 3.7 3.5 - 5.5 mmol/L    Chloride 103 100 - 111 mmol/L    CO2 33 (H) 21 - 32 mmol/L    Anion gap 2 (L) 3.0 - 18 mmol/L    Glucose 103 (H) 74 - 99 mg/dL    BUN 11 7.0 - 18 MG/DL    Creatinine 0.82 0.6 - 1.3 MG/DL    BUN/Creatinine ratio 13 12 - 20      GFR est AA >60 >60 ml/min/1.73m2    GFR est non-AA >60 >60 ml/min/1.73m2    Calcium 9.2 8.5 - 10.1 MG/DL    Bilirubin, total 0.7 0.2 - 1.0 MG/DL    ALT (SGPT) 26 13 - 56 U/L    AST (SGOT) 18 10 - 38 U/L    Alk. phosphatase 83 45 - 117 U/L    Protein, total 7.9 6.4 - 8.2 g/dL    Albumin 3.5 3.4 - 5.0 g/dL    Globulin 4.4 (H) 2.0 - 4.0 g/dL    A-G Ratio 0.8 0.8 - 1.7     LIPID PANEL   Result Value Ref Range    LIPID PROFILE          Cholesterol, total 235 (H) <200 MG/DL    Triglyceride 147 <150 MG/DL    HDL Cholesterol 50 40 - 60 MG/DL    LDL, calculated 155.6 (H) 0 - 100 MG/DL    VLDL, calculated 29.4 MG/DL    CHOL/HDL Ratio 4.7 0 - 5.0         ASSESSMENT/PLAN  Bertha was seen today for hypertension, cholesterol problem    Diagnoses and associated orders for this visit:    Anxiety  Fair control  Cont prn vistaril    Essential hypertension-  controlled   cont HCTZ   H/o hypotension with increase in BP meds  monitoring  CMP prior to next visit  -     hydroCHLOROthiazide (HYDRODIURIL) 25 mg tablet; Take 1 Tablet by mouth daily.         White coat phenomenon with diagnosis of hypertension    Prediabetes-   TLCs, monitoring  A1c/cmp prior to next visit    Hyperlipidemia-   intolerant to lipitor/pravachol , calculated 18.8yr cv risk 12% foa ge  She does not want to try another statin, advised low fat  Diet, monitoring  Consider zetia, she is still hesitant    Osteopenia-   cont vit D/ Ca RDA  Update dexa 5/2023     GERD-   Fairly stable  Back on prilosec due to cost of nexium, recommend trial bulk stores for better cost  H/o esophageal dilation ,  hiatal hernia  Avoid food triggers, smaller meals  GI following    BMI 37  Discussed weight loss strategies, limit carbs rice/avoid apple juice    Severe obesity (BMI 35.0-39. 9) with comorbidity (HCC)    Elevated serum globulin level  -     PROTEIN ELECTROPHORESIS; Future  -     CBC WITH AUTOMATED DIFF; Future      Declines flu/PCV  vaccines    Follow-up Disposition:  Return in about 6 months or if symptoms worsen or fail to improve. Patient understands plan of care. Patient has provided input and agrees with goals.

## 2022-03-03 NOTE — PATIENT INSTRUCTIONS
Alimentación saludable para el corazón: Instrucciones de cuidado  Heart-Healthy Diet: Care Instructions  Instrucciones de cuidado     Deandre alimentación saludable para el corazón incluye abundantes verduras, frutas, nueces, frijoles (habichuelas) y granos integrales, y es baja en sal. Limita los alimentos ricos en grasas saturadas, will por ejemplo, destini, quesos y frituras. Podría resultarle difícil cambiar kate alimentación, puja incluso los cambios pequeños pueden reducir el riesgo de ataque al corazón y enfermedades cardíacas. La atención de seguimiento es deandre parte clave de kate tratamiento y seguridad. Asegúrese de hacer y acudir a todas las citas, y llame a kate médico si está teniendo problemas. También es deandre buena idea saber los resultados de ninoska exámenes y mantener deandre lista de los medicamentos que jemma. ¿Cómo puede cuidarse en el hogar? Controle las porciones  · Use las etiquetas de los alimentos para saber cuáles son las porciones recomendadas para los alimentos que consume. · Coma solo la cantidad de calorías que necesita para mantener un peso saludable. Si necesita bajar de [place], queme más calorías (mediante el ejercicio y otras actividades físicas) que las que consume. Consuma más frutas y verduras  · Coma deandre variedad de frutas y verduras todos los ignacio. Jess Half y las verduras de color mariana oscuro, naranja intenso, parr o amarillo son particularmente buenas para usted. Jen Jolly son espinacas, zanahorias, duraznos (melocotones) y bayas. · Tenga a mano zanahorias, apio y otras verduras para los refrigerios. Compre fruta de temporada y póngala donde la pueda joshua para que se sienta tentado a comerla. · Prepare platos que tengan muchas verduras, will verduras salteadas y sopas. Limite las grasas saturadas  · Mary las etiquetas de los alimentos y trate de evitar las grasas saturadas. Aumentan kate riesgo de enfermedad cardíaca. · Use aceite de carl o canola cuando cocine.   · Prepare los alimentos al horno, a la joaquina o al vapor, en lugar de freírlos. · Elija carne magra en vez de destini ricas en grasas will los perritos calientes y las salchichas. Al cocinar las destini, elimine toda la grasa visible. · Coma pescado, aves sin piel y alternativas de la carne will productos de soya en lugar de destini ricas en grasas. Los productos de soya, will el tofu, pueden ser especialmente buenos para Lindle Fried. · [address] y productos lácteos bajos en grasa o sin grasa. Coma alimentos ricos en fibra  · Coma deandre variedad de productos de granos todos los días. Incluya alimentos de grano integral que [place] y otros nutrientes. Algunos ejemplos de alimentos con granos integrales son miguel angel, pan integral y arroz integral.  · Compre panes y cereales integrales en lugar de pan chamberlain o productos de pastelería. Limite la sal y el sodio  · Limite la cantidad de sal y sodio que consume para ayudar a reducir la presión arterial.  · Pruebe la comida antes de ponerle sal. Agregue solo un poco de sal cuando le parezca que tatyana falta. Con el tiempo, ninoska papilas gustativas se acostumbrarán a menos sal.  · Coma menos refrigerios, comida rápida y otros alimentos procesados ricos en sal. Revise la etiqueta de los alimentos para conocer la cantidad de sodio de los alimentos envasados. · Elija versiones bajas en sodio de alimentos enlatados (will sopas, verduras y frijoles). Limite el azúcar  · Limite las bebidas y los alimentos que contengan azúcar [place]. Entre estos se encuentran los [place], los postres y las sodas. Limite el alcohol  · Limite el alcohol a no más de 2 bebidas al día si es hombre y 1 bebida al día si es Deandre. Beber demasiado alcohol puede causarle problemas de [place]. ¿Cuándo debe pedir ayuda?   Preste especial atención a los cambios en kate daniela y asegúrese de comunicarse con kate médico si:    · Le gustaría recibir ayuda para planificar las comidas saludables para el corazón. ¿Dónde puede encontrar más información en inglés? Vaya a http://www.gray.com/  Lisa V137 en la búsqueda para aprender más acerca de \"Alimentación saludable para el corazón: Instrucciones de cuidado. \"  Revisado: 29 luizail, 2021               Versión del contenido: 13.0  © 8911-7275 Healthwise, Incorporated. Las instrucciones de cuidado fueron adaptadas bajo licencia por [employer] (which disclaims liability or warranty for this information). Si usted tiene [place] afección médica o sobre estas instrucciones, siempre pregunte a kate profesional de daniela. Healthwise, Incorporated niega toda garantía o responsabilidad por kate uso de esta información. Medicare Wellness Visit, Female     The best way to live healthy is to have a lifestyle where you eat a well-balanced diet, exercise regularly, limit alcohol use, and quit all forms of tobacco/nicotine, if applicable. Regular preventive services are another way to keep healthy. Preventive services (vaccines, screening tests, monitoring & exams) can help personalize your care plan, which helps you manage your own care. Screening tests can find health problems at the earliest stages, when they are easiest to treat. [facility] follows the current, evidence-based guidelines published by the [place] Rex Johnson (USPSTF) when recommending preventive services for our patients. Because we follow these guidelines, sometimes recommendations change over time as research supports it. (For example, mammograms used to be recommended annually. Even though Medicare will still pay for an annual mammogram, the newer guidelines recommend a mammogram every two years for women of average risk). Of course, you and your doctor may decide to screen more often for some diseases, based on your risk and your co-morbidities (chronic disease you are already diagnosed with). Preventive services for you include:  - Medicare offers their members a free annual wellness visit, which is time for you and your primary care provider to discuss and plan for your preventive service needs. Take advantage of this benefit every year!  -All adults over the age of 72 should receive the recommended pneumonia vaccines. Current USPSTF guidelines recommend a series of two vaccines for the best pneumonia protection.   -All adults should have a flu vaccine yearly and a tetanus vaccine every 10 years.   -All adults age 48 and older should receive the shingles vaccines (series of two vaccines). -All adults age 38-68 who are overweight should have a diabetes screening test once every three years.   -All adults born between [birth year] and 1967 should be screened once for Hepatitis C.  -Other screening tests and preventive services for persons with diabetes include: an eye exam to screen for diabetic retinopathy, a kidney function test, a foot exam, and stricter control over your cholesterol.   -Cardiovascular screening for adults with routine risk involves an electrocardiogram (ECG) at intervals determined by your doctor.   -Colorectal cancer screenings should be done for adults age 54-65 with no increased risk factors for colorectal cancer. There are a number of acceptable methods of screening for this type of cancer. Each test has its own benefits and drawbacks. Discuss with your doctor what is most appropriate for you during your annual wellness visit. The different tests include: colonoscopy (considered the best screening method), a fecal occult blood test, a fecal DNA test, and sigmoidoscopy.    -A bone mass density test is recommended when a woman turns 65 to screen for osteoporosis. This test is only recommended one time, as a screening. Some providers will use this same test as a disease monitoring tool if you already have osteoporosis.   -Breast cancer screenings are recommended every other year for women of normal risk, age 54-69.  -Cervical cancer screenings for women over age 72 are only recommended with certain risk factors.      Here is a list of your current Health Maintenance items (your personalized list of preventive services) with a due date:  Health Maintenance Due   Topic Date Due    Hepatitis C Test  Never done    Pneumococcal Vaccine (2 of 2 - PPSV23) 10/19/2016    Yearly Flu Vaccine (1) 09/01/2021    COVID-19 Vaccine (3 - Booster for Pfizer series) 02/06/2022

## 2022-03-03 NOTE — PROGRESS NOTES
1. Have you been to the ER, urgent care clinic since your last visit? Hospitalized since your last visit? No    2. Have you seen or consulted any other health care providers outside of the [address] [place] [address] since your last visit? Include any pap smears or colon screening. Dr. Kalie Arciniega gastroenterology for colonoscopy LOV: 10/13/21.      Chief Complaint   Patient presents with    GERD    Osteopenia    Anxiety    Hypertension    Cholesterol Problem    Blood sugar problem

## 2022-03-18 PROBLEM — Z71.89 ADVANCE DIRECTIVE DISCUSSED WITH PATIENT: Status: ACTIVE | Noted: 2017-03-27

## 2022-03-19 PROBLEM — D12.6 ADENOMATOUS POLYP OF COLON: Status: ACTIVE | Noted: 2018-06-19

## 2022-03-19 PROBLEM — M89.8X1 CHRONIC SCAPULAR PAIN: Status: ACTIVE | Noted: 2017-06-12

## 2022-03-19 PROBLEM — K44.9 HIATAL HERNIA: Status: ACTIVE | Noted: 2019-03-21

## 2022-03-19 PROBLEM — Z71.89 ADVANCE CARE PLANNING: Status: ACTIVE | Noted: 2018-03-19

## 2022-03-19 PROBLEM — G89.29 CHRONIC SCAPULAR PAIN: Status: ACTIVE | Noted: 2017-06-12

## 2022-03-20 PROBLEM — E66.01 SEVERE OBESITY WITH BODY MASS INDEX (BMI) OF 35.0 TO 39.9 WITH SERIOUS COMORBIDITY (HCC): Status: ACTIVE | Noted: 2017-12-01

## 2022-03-20 PROBLEM — I10 WHITE COAT SYNDROME WITH HYPERTENSION: Status: ACTIVE | Noted: 2018-06-19

## 2022-07-12 ENCOUNTER — TRANSCRIBE ORDER (OUTPATIENT)
Dept: SCHEDULING | Age: 77
End: 2022-07-12

## 2022-07-12 DIAGNOSIS — Z12.31 OTHER SCREENING MAMMOGRAM: Primary | ICD-10-CM

## 2022-07-15 ENCOUNTER — HOSPITAL ENCOUNTER (OUTPATIENT)
Dept: MAMMOGRAPHY | Age: 77
Discharge: HOME | End: 2022-07-15
Attending: FAMILY MEDICINE
Payer: MEDICARE

## 2022-07-15 DIAGNOSIS — Z12.31 OTHER SCREENING MAMMOGRAM: ICD-10-CM

## 2022-07-15 PROCEDURE — 77063 BREAST TOMOSYNTHESIS BI: CPT

## 2022-09-27 DIAGNOSIS — E11.9 DIABETES MELLITUS TYPE 2, DIET-CONTROLLED (HCC): ICD-10-CM

## 2022-09-27 RX ORDER — HYDROCHLOROTHIAZIDE 25 MG/1
TABLET ORAL
Qty: 90 TABLET | Refills: 1 | Status: SHIPPED | OUTPATIENT
Start: 2022-09-27 | End: 2022-10-05 | Stop reason: SDUPTHER

## 2022-09-27 RX ORDER — BLOOD SUGAR DIAGNOSTIC
STRIP MISCELLANEOUS
Qty: 100 STRIP | Refills: 3 | Status: SHIPPED | OUTPATIENT
Start: 2022-09-27

## 2022-09-28 ENCOUNTER — HOSPITAL ENCOUNTER (OUTPATIENT)
Dept: LAB | Age: 77
Discharge: HOME | End: 2022-09-28
Payer: MEDICARE

## 2022-09-28 DIAGNOSIS — R77.1 ELEVATED SERUM GLOBULIN LEVEL: ICD-10-CM

## 2022-09-28 DIAGNOSIS — R73.01 IFG (IMPAIRED FASTING GLUCOSE): ICD-10-CM

## 2022-09-28 LAB
ALBUMIN SERPL-MCNC: 3.7 G/DL (ref 3.4–5)
ALBUMIN/GLOB SERPL: 0.9 {RATIO} (ref 0.8–1.7)
ALP SERPL-CCNC: 81 U/L (ref 45–117)
ALT SERPL-CCNC: 24 U/L (ref 13–56)
ANION GAP SERPL CALC-SCNC: 6 MMOL/L (ref 3–18)
AST SERPL-CCNC: 10 U/L (ref 10–38)
BASOPHILS # BLD: 0 K/UL (ref 0–0.1)
BASOPHILS NFR BLD: 1 % (ref 0–2)
BILIRUB SERPL-MCNC: 0.5 MG/DL (ref 0.2–1)
BUN SERPL-MCNC: 10 MG/DL (ref 7–18)
BUN/CREAT SERPL: 13 (ref 12–20)
CALCIUM SERPL-MCNC: 9.1 MG/DL (ref 8.5–10.1)
CHLORIDE SERPL-SCNC: 105 MMOL/L (ref 100–111)
CO2 SERPL-SCNC: 30 MMOL/L (ref 21–32)
CREAT SERPL-MCNC: 0.78 MG/DL (ref 0.6–1.3)
DIFFERENTIAL METHOD BLD: ABNORMAL
EOSINOPHIL # BLD: 0.1 K/UL (ref 0–0.4)
EOSINOPHIL NFR BLD: 2 % (ref 0–5)
ERYTHROCYTE [DISTWIDTH] IN BLOOD BY AUTOMATED COUNT: 16.5 % (ref 11.6–14.5)
EST. AVERAGE GLUCOSE BLD GHB EST-MCNC: 114 MG/DL
GLOBULIN SER CALC-MCNC: 4.1 G/DL (ref 2–4)
GLUCOSE SERPL-MCNC: 129 MG/DL (ref 74–99)
HBA1C MFR BLD: 5.6 % (ref 4.2–5.6)
HCT VFR BLD AUTO: 44.1 % (ref 35–45)
HGB BLD-MCNC: 14.1 G/DL (ref 12–16)
IMM GRANULOCYTES # BLD AUTO: 0 K/UL (ref 0–0.04)
IMM GRANULOCYTES NFR BLD AUTO: 0 % (ref 0–0.5)
LYMPHOCYTES # BLD: 2 K/UL (ref 0.9–3.6)
LYMPHOCYTES NFR BLD: 30 % (ref 21–52)
MCH RBC QN AUTO: 24.4 PG (ref 24–34)
MCHC RBC AUTO-ENTMCNC: 32 G/DL (ref 31–37)
MCV RBC AUTO: 76.3 FL (ref 78–100)
MONOCYTES # BLD: 0.5 K/UL (ref 0.05–1.2)
MONOCYTES NFR BLD: 7 % (ref 3–10)
NEUTS SEG # BLD: 4.1 K/UL (ref 1.8–8)
NEUTS SEG NFR BLD: 61 % (ref 40–73)
NRBC # BLD: 0 K/UL (ref 0–0.01)
NRBC BLD-RTO: 0 PER 100 WBC
PLATELET # BLD AUTO: 170 K/UL (ref 135–420)
PMV BLD AUTO: 11.5 FL (ref 9.2–11.8)
POTASSIUM SERPL-SCNC: 4.6 MMOL/L (ref 3.5–5.5)
PROT SERPL-MCNC: 7.8 G/DL (ref 6.4–8.2)
RBC # BLD AUTO: 5.78 M/UL (ref 4.2–5.3)
SODIUM SERPL-SCNC: 141 MMOL/L (ref 136–145)
WBC # BLD AUTO: 6.7 K/UL (ref 4.6–13.2)

## 2022-09-28 PROCEDURE — 36415 COLL VENOUS BLD VENIPUNCTURE: CPT

## 2022-09-28 PROCEDURE — 84165 PROTEIN E-PHORESIS SERUM: CPT

## 2022-09-28 PROCEDURE — 85025 COMPLETE CBC W/AUTO DIFF WBC: CPT

## 2022-09-28 PROCEDURE — 83036 HEMOGLOBIN GLYCOSYLATED A1C: CPT

## 2022-09-28 PROCEDURE — 80053 COMPREHEN METABOLIC PANEL: CPT

## 2022-09-30 LAB
ALBUMIN SERPL ELPH-MCNC: 3.3 G/DL (ref 2.9–4.4)
ALBUMIN/GLOB SERPL: 0.8 {RATIO} (ref 0.7–1.7)
ALPHA1 GLOB SERPL ELPH-MCNC: 0.2 G/DL (ref 0–0.4)
ALPHA2 GLOB SERPL ELPH-MCNC: 0.9 G/DL (ref 0.4–1)
B-GLOBULIN SERPL ELPH-MCNC: 1.1 G/DL (ref 0.7–1.3)
GAMMA GLOB SERPL ELPH-MCNC: 1.8 G/DL (ref 0.4–1.8)
GLOBULIN SER CALC-MCNC: 4 G/DL (ref 2.2–3.9)
M PROTEIN SERPL ELPH-MCNC: ABNORMAL G/DL
PROT SERPL-MCNC: 7.3 G/DL (ref 6–8.5)

## 2022-10-05 ENCOUNTER — OFFICE VISIT (OUTPATIENT)
Dept: FAMILY MEDICINE CLINIC | Age: 77
End: 2022-10-05
Payer: MEDICARE

## 2022-10-05 VITALS
OXYGEN SATURATION: 98 % | DIASTOLIC BLOOD PRESSURE: 66 MMHG | BODY MASS INDEX: 35.88 KG/M2 | HEIGHT: 59 IN | HEART RATE: 75 BPM | RESPIRATION RATE: 16 BRPM | SYSTOLIC BLOOD PRESSURE: 128 MMHG | WEIGHT: 178 LBS | TEMPERATURE: 98 F

## 2022-10-05 DIAGNOSIS — E78.00 PURE HYPERCHOLESTEROLEMIA: ICD-10-CM

## 2022-10-05 DIAGNOSIS — F41.9 ANXIETY: ICD-10-CM

## 2022-10-05 DIAGNOSIS — I10 WHITE COAT SYNDROME WITH HYPERTENSION: Primary | ICD-10-CM

## 2022-10-05 DIAGNOSIS — K21.9 GASTROESOPHAGEAL REFLUX DISEASE, UNSPECIFIED WHETHER ESOPHAGITIS PRESENT: ICD-10-CM

## 2022-10-05 DIAGNOSIS — R73.01 IFG (IMPAIRED FASTING GLUCOSE): ICD-10-CM

## 2022-10-05 DIAGNOSIS — M85.80 OSTEOPENIA, UNSPECIFIED LOCATION: ICD-10-CM

## 2022-10-05 DIAGNOSIS — L02.93 CARBUNCLE: ICD-10-CM

## 2022-10-05 PROCEDURE — 99214 OFFICE O/P EST MOD 30 MIN: CPT | Performed by: FAMILY MEDICINE

## 2022-10-05 PROCEDURE — G8536 NO DOC ELDER MAL SCRN: HCPCS | Performed by: FAMILY MEDICINE

## 2022-10-05 PROCEDURE — 1090F PRES/ABSN URINE INCON ASSESS: CPT | Performed by: FAMILY MEDICINE

## 2022-10-05 PROCEDURE — G8432 DEP SCR NOT DOC, RNG: HCPCS | Performed by: FAMILY MEDICINE

## 2022-10-05 PROCEDURE — G8752 SYS BP LESS 140: HCPCS | Performed by: FAMILY MEDICINE

## 2022-10-05 PROCEDURE — G8427 DOCREV CUR MEDS BY ELIG CLIN: HCPCS | Performed by: FAMILY MEDICINE

## 2022-10-05 PROCEDURE — G8754 DIAS BP LESS 90: HCPCS | Performed by: FAMILY MEDICINE

## 2022-10-05 PROCEDURE — 1101F PT FALLS ASSESS-DOCD LE1/YR: CPT | Performed by: FAMILY MEDICINE

## 2022-10-05 PROCEDURE — 1123F ACP DISCUSS/DSCN MKR DOCD: CPT | Performed by: FAMILY MEDICINE

## 2022-10-05 PROCEDURE — G8417 CALC BMI ABV UP PARAM F/U: HCPCS | Performed by: FAMILY MEDICINE

## 2022-10-05 PROCEDURE — G8399 PT W/DXA RESULTS DOCUMENT: HCPCS | Performed by: FAMILY MEDICINE

## 2022-10-05 RX ORDER — HYDROCHLOROTHIAZIDE 25 MG/1
TABLET ORAL
Qty: 90 TABLET | Refills: 1 | Status: SHIPPED | OUTPATIENT
Start: 2022-10-05

## 2022-10-05 RX ORDER — DOXYCYCLINE 100 MG/1
100 TABLET ORAL 2 TIMES DAILY
Qty: 20 TABLET | Refills: 0 | Status: SHIPPED | OUTPATIENT
Start: 2022-10-05 | End: 2022-10-15

## 2022-10-05 NOTE — PROGRESS NOTES
This is the Subsequent Medicare Annual Wellness Exam, performed 12 months or more after the Initial AWV or the last Subsequent AWV    I have reviewed the patient's medical history in detail and updated the computerized patient record. Assessment/Plan   Education and counseling provided:  Are appropriate based on today's review and evaluation  Cardiovascular screening blood test 2/2022  Bone mass measurement (DEXA) 2021 update 5/2023  Screening for glaucoma- annually      Depression Risk Factor Screening     3 most recent PHQ Screens 3/3/2022   PHQ Not Done -   Little interest or pleasure in doing things Not at all   Feeling down, depressed, irritable, or hopeless Several days   Total Score PHQ 2 1       Alcohol & Drug Abuse Risk Screen    Do you average more than 1 drink per night or more than 7 drinks a week:  No    On any one occasion in the past three months have you have had more than 3 drinks containing alcohol:  No          Functional Ability and Level of Safety    Hearing: Hearing is good. Activities of Daily Living: The home contains: no safety equipment. Patient does total self care      Ambulation: with mild difficulty     Fall Risk:  Fall Risk Assessment, last 12 mths 3/3/2022   Able to walk? Yes   Fall in past 12 months? 0   Do you feel unsteady? 0   Are you worried about falling 0   Number of falls in past 12 months -   Fall with injury?  -      Abuse Screen:  Patient is not abused       Cognitive Screening    Has your family/caregiver stated any concerns about your memory: no         Health Maintenance Due     Health Maintenance Due   Topic Date Due    Hepatitis C Screening  Never done    Pneumococcal 65+ years (2 - PPSV23 or PCV20) 10/19/2016    COVID-19 Vaccine (3 - Booster for Pfizer series) 02/06/2022    Flu Vaccine (1) 08/01/2022       Patient Care Team   Patient Care Team:  Ramírez Beach MD as PCP - General (Family Medicine)  Ramírez Beach MD as PCP - [facility] Provider  Hussein Sharp MD (Surgery Physician)  Fercho Bolanos MD (Physical Medicine and Rehabilitation Physician)  Samreen Archibald MD as Surgeon (Surgical Oncology)  Rodolfo Otero MD (Orthopedic Surgery)  Deyanira Arias DO (Optometry)  Shashank Beckman MD (Gastroenterology)  Brady Galeano MD (Gastroenterology)  Dennis Galvez MD (Gastroenterology)  Raciel Chang MD (Sleep Medicine Physician)  Mookie Cobos MD (Vascular Surgery)    History     Patient Active Problem List   Diagnosis Code    IFG (impaired fasting glucose) R73.01    Hyperlipidemia E78.5    GERD (gastroesophageal reflux disease) K21.9    Obesity E66.9    Carotid stenosis, left I65.22    Osteopenia M85.80    Anxiety F41.9    Advance directive discussed with patient-DARIUSZ Healy Z71.89    Chronic scapular pain-intermittent M89.8X1, G89.29    Severe obesity with body mass index (BMI) of 35.0 to 39.9 with serious comorbidity ([facility] E66.01    Advance care planning Z71.89    White coat syndrome with hypertension I10    Adenomatous polyp of colon D12.6    Hiatal hernia K44.9     Past Medical History:   Diagnosis Date    Alpha thalassemia ([facility] 2/15/2012    Anxiety     BMI 40.0-44.9, adult ([facility] 5/10/2017    Breast tumor     benign cyst    Carotid stenosis, left     LICA <42%    Esophageal stricture     dr. Oscar Burroughs, s/p balloon dilation 9/2016    Gastric polyps     GERD (gastroesophageal reflux disease)     endoscopy 7/08- hiatal hernia/gastric polyps, esophagitis 9/2016 on prilosec/carafate    Headache(784.0)     Hx of colonoscopy 05/26/2015    tubular adenoma, rpt 3 years 2018 dr. Oscar Burroughs    Hypertension     nuclear stress test neg 1/2009    IFG (impaired fasting glucose)     Osteopenia     Reflux esophagitis 11/06/2018    EGD, dr. Anthony Santos      Past Surgical History:   Procedure Laterality Date    HX BREAST BIOPSY  1978    bilateral breasts    HX HYSTERECTOMY       Current Outpatient Medications   Medication Sig Dispense Refill hydroCHLOROthiazide (HYDRODIURIL) 25 mg tablet TAKE 1 TABLET EVERY DAY 90 Tablet 1    doxycycline (ADOXA) 100 mg tablet Take 1 Tablet by mouth two (2) times a day for 10 days. 20 Tablet 0    Accu-Chek Zulma Plus test strp strip TEST BLOOD SUGAR EVERY  Strip 3    lancets (Accu-Chek Softclix Lancets) misc TEST BLOOD SUGAR EVERY  Each 3    meclizine (ANTIVERT) 25 mg tablet       hydrOXYzine pamoate (VISTARIL) 50 mg capsule Take 1 Cap by mouth three (3) times daily as needed for Anxiety or Sleep. 90 Cap 0    Blood-Glucose Meter monitoring kit Accu-chek Zulma plus meter. Check fasting glucose daily. DX: E11.9 1 Kit 0    omeprazole (PRILOSEC) 40 mg capsule Take 1 Cap by mouth daily. 90 Cap 3    OTHER OTC ear drop for pain      fluticasone (FLONASE) 50 mcg/actuation nasal spray       omega-3 fatty acids-vitamin e 1,000 mg cap Take 1 Cap by mouth daily. multivitamin (ONE A DAY) tablet Take 1 Tab by mouth daily. Allergies   Allergen Reactions    Bystolic [Nebivolol] Other (comments)     Leg cramps    Norvasc [Amlodipine] Swelling    Pravachol [Pravastatin] Myalgia       Family History   Problem Relation Age of Onset    Cancer Mother      Social History     Tobacco Use    Smoking status: Never    Smokeless tobacco: Never   Substance Use Topics    Alcohol use: No     Comment: \"Often\"         MD Mamta Sanchez, 76 y.o.,  female    SUBJECTIVE  Routine ff-up    HTN with white coat-  Taking  HCTZ. Home BP readings 120/80's. She reports being on multiple hypertensives in the past, and cut out BP meds due to hypotension/ and  white coat hypertension. We have compared BP monitors in the past and consistent. HL/prediabetes-improved diet with resultant weight loss, a1c/lipids have improved,   says intolerant to lipitor/pravachol pt reports muscle aches. does not want to try any other statin. Previous MRI 2014 showing minimal presumed chronic microvascular ischemic changes. GERD-currently on prilosec, reports better response on nexium however too costly. Recommended bulk stores as option,  EGD 11/18 reviewed showing hiatal hernia, esophageal dilation. GI following. Reviewed crcs 10/2021 dr. Junior Pfeiffer polyp no further screening indicated    Anxiety- reports response to vistaril prn    Painful R buttock boil past week, no fever    ROS:  See HPI, all others negative        Patient Active Problem List   Diagnosis Code    IFG (impaired fasting glucose) R73.01    Hyperlipidemia E78.5    GERD (gastroesophageal reflux disease) K21.9    Alpha thalassemia ([facility]) D56.0    Obesity E66.9    Carotid stenosis, left I65.22    Osteopenia M85.80    Anxiety F41.9    Hypertensive retinopathy H35.039    Essential hypertension I10    Advance directive discussed with patient-DARIUSZ Micheliner Z71.89    Chronic scapular pain-intermittent M89.8X1, G89.29    Severe obesity (BMI 35.0-39.9) ([facility]) E66.01       Current Outpatient Medications:     hydroCHLOROthiazide (HYDRODIURIL) 25 mg tablet, TAKE 1 TABLET EVERY DAY, Disp: 90 Tablet, Rfl: 1    doxycycline (ADOXA) 100 mg tablet, Take 1 Tablet by mouth two (2) times a day for 10 days. , Disp: 20 Tablet, Rfl: 0    Accu-Chek Zulma Plus test strp strip, TEST BLOOD SUGAR EVERY DAY, Disp: 100 Strip, Rfl: 3    lancets (Accu-Chek Softclix Lancets) misc, TEST BLOOD SUGAR EVERY DAY, Disp: 100 Each, Rfl: 3    meclizine (ANTIVERT) 25 mg tablet, , Disp: , Rfl:     hydrOXYzine pamoate (VISTARIL) 50 mg capsule, Take 1 Cap by mouth three (3) times daily as needed for Anxiety or Sleep., Disp: 90 Cap, Rfl: 0    Blood-Glucose Meter monitoring kit, Accu-chek Zulma plus meter. Check fasting glucose daily. DX: E11.9, Disp: 1 Kit, Rfl: 0    omeprazole (PRILOSEC) 40 mg capsule, Take 1 Cap by mouth daily. , Disp: 90 Cap, Rfl: 3    OTHER, OTC ear drop for pain, Disp: , Rfl:     fluticasone (FLONASE) 50 mcg/actuation nasal spray, , Disp: , Rfl:     omega-3 fatty acids-vitamin e 1,000 mg cap, Take 1 Cap by mouth daily. , Disp: , Rfl:     multivitamin (ONE A DAY) tablet, Take 1 Tab by mouth daily. , Disp: , Rfl:     Allergies   Allergen Reactions    Bystolic [Nebivolol] Other (comments)     Leg cramps    Norvasc [Amlodipine] Swelling    Pravachol [Pravastatin] Myalgia       Past Medical History:   Diagnosis Date    Alpha thalassemia ([facility] 2/15/2012    Anxiety     BMI 40.0-44.9, adult ([facility] 5/10/2017    Breast tumor     benign cyst    Carotid stenosis, left     LICA <66%    Esophageal stricture     dr. Loly Bowen, s/p balloon dilation 9/2016    Gastric polyps     GERD (gastroesophageal reflux disease)     endoscopy 7/08- hiatal hernia/gastric polyps, esophagitis 9/2016 on prilosec/carafate    Headache(784.0)     Hypertension     nuclear stress test neg 1/2009    IFG (impaired fasting glucose)     Osteopenia        Social History     Social History    Marital status:      Spouse name: N/A    Number of children: N/A    Years of education: N/A     Occupational History    Not on file. Social History Main Topics    Smoking status: Never Smoker    Smokeless tobacco: Never Used    Alcohol use No      Comment: \"Often\"    Drug use: No    Sexual activity: Yes     Partners: Male     Other Topics Concern    Not on file     Social History Narrative       Family History   Problem Relation Age of Onset    Cancer Mother          OBJECTIVE    Physical Exam:     Visit Vitals  /66   Pulse 75   Temp 98 °F (36.7 °C) (Temporal)   Resp 16   Ht 4' 11\" (1.499 m)   Wt 178 lb (80.7 kg)   SpO2 98%   BMI 35.95 kg/m²         General: alert, n no apparent distress or pain  CVS: normal rate, regular rhythm, distinct S1 and S2  Lungs:clear to ausculation bilaterally, no crackles, wheezing or rhonchi noted  Abdomen: soft, NT, ND  Extremities: no edema  Skin: R buttock multiple tender papules with purulent discharge.   Psych:  mood and affect normal  Results for orders placed or performed during the hospital encounter of 09/28/22   HEMOGLOBIN A1C WITH EAG   Result Value Ref Range    Hemoglobin A1c 5.6 4.2 - 5.6 %    Est. average glucose 585 mg/dL   METABOLIC PANEL, COMPREHENSIVE   Result Value Ref Range    Sodium 141 136 - 145 mmol/L    Potassium 4.6 3.5 - 5.5 mmol/L    Chloride 105 100 - 111 mmol/L    CO2 30 21 - 32 mmol/L    Anion gap 6 3.0 - 18 mmol/L    Glucose 129 (H) 74 - 99 mg/dL    BUN 10 7.0 - 18 MG/DL    Creatinine 0.78 0.6 - 1.3 MG/DL    BUN/Creatinine ratio 13 12 - 20      GFR est AA >60 >60 ml/min/1.73m2    GFR est non-AA >60 >60 ml/min/1.73m2    Calcium 9.1 8.5 - 10.1 MG/DL    Bilirubin, total 0.5 0.2 - 1.0 MG/DL    ALT (SGPT) 24 13 - 56 U/L    AST (SGOT) 10 10 - 38 U/L    Alk. phosphatase 81 45 - 117 U/L    Protein, total 7.8 6.4 - 8.2 g/dL    Albumin 3.7 3.4 - 5.0 g/dL    Globulin 4.1 (H) 2.0 - 4.0 g/dL    A-G Ratio 0.9 0.8 - 1.7     PROTEIN ELECTROPHORESIS   Result Value Ref Range    Protein, total 7.3 6.0 - 8.5 g/dL    Albumin 3.3 2.9 - 4.4 g/dL    Alpha-1-globulin 0.2 0.0 - 0.4 g/dL    ALPHA-2 GLOBULIN 0.9 0.4 - 1.0 g/dL    Beta globulin 1.1 0.7 - 1.3 g/dL    Gamma globulin 1.8 0.4 - 1.8 g/dL    M-Jason Not Observed Not Observed g/dL    Globulin, total 4.0 (H) 2.2 - 3.9 g/dL    A/G ratio 0.8 0.7 - 1.7     CBC WITH AUTOMATED DIFF   Result Value Ref Range    WBC 6.7 4.6 - 13.2 K/uL    RBC 5.78 (H) 4.20 - 5.30 M/uL    HGB 14.1 12.0 - 16.0 g/dL    HCT 44.1 35.0 - 45.0 %    MCV 76.3 (L) 78.0 - 100.0 FL    MCH 24.4 24.0 - 34.0 PG    MCHC 32.0 31.0 - 37.0 g/dL    RDW 16.5 (H) 11.6 - 14.5 %    PLATELET 678 470 - 507 K/uL    MPV 11.5 9.2 - 11.8 FL    NRBC 0.0 0  WBC    ABSOLUTE NRBC 0.00 0.00 - 0.01 K/uL    NEUTROPHILS 61 40 - 73 %    LYMPHOCYTES 30 21 - 52 %    MONOCYTES 7 3 - 10 %    EOSINOPHILS 2 0 - 5 %    BASOPHILS 1 0 - 2 %    IMMATURE GRANULOCYTES 0 0.0 - 0.5 %    ABS. NEUTROPHILS 4.1 1.8 - 8.0 K/UL    ABS. LYMPHOCYTES 2.0 0.9 - 3.6 K/UL    ABS. MONOCYTES 0.5 0.05 - 1.2 K/UL    ABS.  EOSINOPHILS 0.1 0.0 - 0.4 K/UL    ABS. BASOPHILS 0.0 0.0 - 0.1 K/UL    ABS. IMM. GRANS. 0.0 0.00 - 0.04 K/UL    DF AUTOMATED         ASSESSMENT/PLAN  Bertha was seen today for hypertension, cholesterol problem    Diagnoses and associated orders for this visit:    Anxiety  Fair control  Cont prn vistaril    Essential hypertension-  controlled   cont HCTZ   H/o hypotension with increase in BP meds  monitoring  CMP prior to next visit  -     hydroCHLOROthiazide (HYDRODIURIL) 25 mg tablet; Take 1 Tablet by mouth daily. White coat phenomenon with diagnosis of hypertension    Prediabetes-   TLCs, monitoring  Commended on wt loss    Hyperlipidemia-  Improving, commended on wt loss   intolerant to lipitor/pravachol , calculated 18.8yr cv risk 12% foa ge  She does not want to try another statin, advised low fat  Diet, monitoring    Osteopenia-   cont vit D/ Ca RDA  Update dexa 5/2023     GERD unspecified esophagitis   Fairly stable  Back on prilosec due to cost of nexium, recommend trial bulk stores for better cost  H/o esophageal dilation ,  hiatal hernia  Avoid food triggers, smaller meals  GI following    BMI 35  Commended on wt loss  Better a1c    Carbuncle  Warm compresses, start doxy bid x 10 days    Declines flu/PCV  vaccines    Follow-up Disposition:  Return in about 6 months or if symptoms worsen or fail to improve. Plan on [address] then       Patient understands plan of care. Patient has provided input and agrees with goals.

## 2022-10-05 NOTE — PROGRESS NOTES
1. Have you been to the ER, urgent care clinic since your last visit? Hospitalized since your last visit? No    2. Have you seen or consulted any other health care providers outside of the [address] [place] [address] since your last visit? Include any pap smears or colon screening.  No

## 2023-03-27 ENCOUNTER — HOSPITAL ENCOUNTER (OUTPATIENT)
Facility: HOSPITAL | Age: 78
Discharge: HOME | End: 2023-03-30
Payer: COMMERCIAL

## 2023-03-27 LAB
ALBUMIN SERPL-MCNC: 3.5 G/DL (ref 3.4–5)
ALBUMIN/GLOB SERPL: 0.8 (ref 0.8–1.7)
ALP SERPL-CCNC: 85 U/L (ref 45–117)
ALT SERPL-CCNC: 23 U/L (ref 13–56)
ANION GAP SERPL CALC-SCNC: 4 MMOL/L (ref 3–18)
AST SERPL-CCNC: 19 U/L (ref 10–38)
BILIRUB SERPL-MCNC: 0.5 MG/DL (ref 0.2–1)
BUN SERPL-MCNC: 12 MG/DL (ref 7–18)
BUN/CREAT SERPL: 16 (ref 12–20)
CALCIUM SERPL-MCNC: 9.4 MG/DL (ref 8.5–10.1)
CHLORIDE SERPL-SCNC: 103 MMOL/L (ref 100–111)
CO2 SERPL-SCNC: 30 MMOL/L (ref 21–32)
CREAT SERPL-MCNC: 0.76 MG/DL (ref 0.6–1.3)
GLOBULIN SER CALC-MCNC: 4.2 G/DL (ref 2–4)
GLUCOSE SERPL-MCNC: 99 MG/DL (ref 74–99)
POTASSIUM SERPL-SCNC: 4 MMOL/L (ref 3.5–5.5)
PROT SERPL-MCNC: 7.7 G/DL (ref 6.4–8.2)
SODIUM SERPL-SCNC: 137 MMOL/L (ref 136–145)

## 2023-03-27 PROCEDURE — 36415 COLL VENOUS BLD VENIPUNCTURE: CPT

## 2023-03-27 PROCEDURE — 80053 COMPREHEN METABOLIC PANEL: CPT

## 2023-04-04 NOTE — PATIENT INSTRUCTIONS
program that is right for you. Phone toll-free: [phone]  Website:  www. [employer]. [address]  o    What they offer: The [address] [place] [address] gives you and your family access to a free Prescription Drug Card program and savings of up to 75% at more than 50,000 national and The [employer]. Phone toll-free: 8-847.398.4259  Website: www.[employer]    additional resources? Call 211 or Find Help: https://www. [employer].org/   For more information on your local Area Agency on Aging or [place] on Aging please visit the appropriate web site below:    [place]: Numecent.pl    [facility]: https://aging. [place].gov/    [place]: https://aging.[place].gov/    [place]: InsuranceSquad.es           Advance Directives: Care Instructions  Overview  An advance directive is a legal way to state your wishes at the end of your life. It tells your family and your doctor what to do if you can't say what you want. There are two main types of advance directives. You can change them any time your wishes change. Living will. This form tells your family and your doctor your wishes about life support and other treatment. The form is also called a declaration. Medical power of . This form lets you name a person to make treatment decisions for you when you can't speak for yourself. This person is called a health care agent (health care proxy, health care surrogate). The form is also called a durable power of  for health care. If you do not have an advance directive, decisions about your medical care may be made by a family member, or by a doctor or a  who doesn't know you. It may help to think of an advance directive as a gift to the people who care for you. If you have one, they won't have to make tough decisions by themselves.   For more information, including forms for your state, see the [address] [employer] website

## 2023-04-05 ENCOUNTER — HOSPITAL ENCOUNTER (OUTPATIENT)
Facility: HOSPITAL | Age: 78
Setting detail: SPECIMEN
Discharge: HOME | End: 2023-04-08
Payer: MEDICARE

## 2023-04-05 ENCOUNTER — OFFICE VISIT (OUTPATIENT)
Age: 78
End: 2023-04-05

## 2023-04-05 VITALS
HEART RATE: 76 BPM | OXYGEN SATURATION: 98 % | DIASTOLIC BLOOD PRESSURE: 80 MMHG | WEIGHT: 183 LBS | RESPIRATION RATE: 16 BRPM | SYSTOLIC BLOOD PRESSURE: 130 MMHG | TEMPERATURE: 98.1 F | HEIGHT: 59 IN | BODY MASS INDEX: 36.89 KG/M2

## 2023-04-05 DIAGNOSIS — R42 DIZZINESS: ICD-10-CM

## 2023-04-05 DIAGNOSIS — E66.01 SEVERE OBESITY (BMI 35.0-39.9) WITH COMORBIDITY (HCC): ICD-10-CM

## 2023-04-05 DIAGNOSIS — R73.01 IFG (IMPAIRED FASTING GLUCOSE): ICD-10-CM

## 2023-04-05 DIAGNOSIS — Z00.00 MEDICARE ANNUAL WELLNESS VISIT, SUBSEQUENT: ICD-10-CM

## 2023-04-05 DIAGNOSIS — L98.9 SKIN LESION: ICD-10-CM

## 2023-04-05 DIAGNOSIS — E78.2 MIXED HYPERLIPIDEMIA: ICD-10-CM

## 2023-04-05 DIAGNOSIS — F41.9 ANXIETY: ICD-10-CM

## 2023-04-05 DIAGNOSIS — M85.80 OSTEOPENIA, UNSPECIFIED LOCATION: ICD-10-CM

## 2023-04-05 DIAGNOSIS — I10 WHITE COAT SYNDROME WITH HYPERTENSION: ICD-10-CM

## 2023-04-05 DIAGNOSIS — G44.52 NEW DAILY PERSISTENT HEADACHE: ICD-10-CM

## 2023-04-05 DIAGNOSIS — D56.0 ALPHA THALASSEMIA (HCC): ICD-10-CM

## 2023-04-05 PROCEDURE — 87252 VIRUS INOCULATION TISSUE: CPT

## 2023-04-05 PROCEDURE — 87205 SMEAR GRAM STAIN: CPT

## 2023-04-05 PROCEDURE — 87075 CULTR BACTERIA EXCEPT BLOOD: CPT

## 2023-04-05 RX ORDER — PHENOL 1.4 %
1 AEROSOL, SPRAY (ML) MUCOUS MEMBRANE DAILY
COMMUNITY

## 2023-04-05 RX ORDER — CEPHALEXIN 500 MG/1
500 CAPSULE ORAL 3 TIMES DAILY
Qty: 30 CAPSULE | Refills: 0 | Status: SHIPPED | OUTPATIENT
Start: 2023-04-05

## 2023-04-05 RX ORDER — HYDROXYZINE PAMOATE 50 MG/1
50 CAPSULE ORAL 3 TIMES DAILY PRN
Qty: 30 CAPSULE | Refills: 2 | Status: SHIPPED | OUTPATIENT
Start: 2023-04-05

## 2023-04-05 SDOH — ECONOMIC STABILITY: HOUSING INSECURITY
IN THE LAST 12 MONTHS, WAS THERE A TIME WHEN YOU DID NOT HAVE A STEADY PLACE TO SLEEP OR SLEPT IN A SHELTER (INCLUDING NOW)?: NO

## 2023-04-05 SDOH — ECONOMIC STABILITY: INCOME INSECURITY: HOW HARD IS IT FOR YOU TO PAY FOR THE VERY BASICS LIKE FOOD, HOUSING, MEDICAL CARE, AND HEATING?: SOMEWHAT HARD

## 2023-04-05 SDOH — ECONOMIC STABILITY: FOOD INSECURITY: WITHIN THE PAST 12 MONTHS, YOU WORRIED THAT YOUR FOOD WOULD RUN OUT BEFORE YOU GOT MONEY TO BUY MORE.: NEVER TRUE

## 2023-04-05 SDOH — ECONOMIC STABILITY: FOOD INSECURITY: WITHIN THE PAST 12 MONTHS, THE FOOD YOU BOUGHT JUST DIDN'T LAST AND YOU DIDN'T HAVE MONEY TO GET MORE.: NEVER TRUE

## 2023-04-05 ASSESSMENT — PATIENT HEALTH QUESTIONNAIRE - PHQ9
4. FEELING TIRED OR HAVING LITTLE ENERGY: 0
5. POOR APPETITE OR OVEREATING: 0
SUM OF ALL RESPONSES TO PHQ QUESTIONS 1-9: 0
8. MOVING OR SPEAKING SO SLOWLY THAT OTHER PEOPLE COULD HAVE NOTICED. OR THE OPPOSITE, BEING SO FIGETY OR RESTLESS THAT YOU HAVE BEEN MOVING AROUND A LOT MORE THAN USUAL: 0
SUM OF ALL RESPONSES TO PHQ QUESTIONS 1-9: 0
SUM OF ALL RESPONSES TO PHQ QUESTIONS 1-9: 0
SUM OF ALL RESPONSES TO PHQ9 QUESTIONS 1 & 2: 0
SUM OF ALL RESPONSES TO PHQ QUESTIONS 1-9: 0
3. TROUBLE FALLING OR STAYING ASLEEP: 0
DEPRESSION UNABLE TO ASSESS: FUNCTIONAL CAPACITY MOTIVATION LIMITS ACCURACY
9. THOUGHTS THAT YOU WOULD BE BETTER OFF DEAD, OR OF HURTING YOURSELF: 0
7. TROUBLE CONCENTRATING ON THINGS, SUCH AS READING THE NEWSPAPER OR WATCHING TELEVISION: 0
1. LITTLE INTEREST OR PLEASURE IN DOING THINGS: 0
6. FEELING BAD ABOUT YOURSELF - OR THAT YOU ARE A FAILURE OR HAVE LET YOURSELF OR YOUR FAMILY DOWN: 0
10. IF YOU CHECKED OFF ANY PROBLEMS, HOW DIFFICULT HAVE THESE PROBLEMS MADE IT FOR YOU TO DO YOUR WORK, TAKE CARE OF THINGS AT HOME, OR GET ALONG WITH OTHER PEOPLE: 0
2. FEELING DOWN, DEPRESSED OR HOPELESS: 0

## 2023-04-05 ASSESSMENT — ANXIETY QUESTIONNAIRES
6. BECOMING EASILY ANNOYED OR IRRITABLE: 0
1. FEELING NERVOUS, ANXIOUS, OR ON EDGE: 1
4. TROUBLE RELAXING: 1
IF YOU CHECKED OFF ANY PROBLEMS ON THIS QUESTIONNAIRE, HOW DIFFICULT HAVE THESE PROBLEMS MADE IT FOR YOU TO DO YOUR WORK, TAKE CARE OF THINGS AT HOME, OR GET ALONG WITH OTHER PEOPLE: SOMEWHAT DIFFICULT
2. NOT BEING ABLE TO STOP OR CONTROL WORRYING: 1
5. BEING SO RESTLESS THAT IT IS HARD TO SIT STILL: 0
7. FEELING AFRAID AS IF SOMETHING AWFUL MIGHT HAPPEN: 1
GAD7 TOTAL SCORE: 5
3. WORRYING TOO MUCH ABOUT DIFFERENT THINGS: 1

## 2023-04-05 ASSESSMENT — LIFESTYLE VARIABLES
HOW OFTEN DO YOU HAVE A DRINK CONTAINING ALCOHOL: NEVER
HOW MANY STANDARD DRINKS CONTAINING ALCOHOL DO YOU HAVE ON A TYPICAL DAY: PATIENT DOES NOT DRINK

## 2023-04-05 NOTE — PROGRESS NOTES
Chief Complaint   Patient presents with    Medicare AWV    Cholesterol Problem    Gastroesophageal Reflux    Hypertension    Other     Hx of IFG and osteopenia     Mass     C/O boil on buttocks area     Dizziness     C/O dizziness along with headaches        1. \"Have you been to the ER, urgent care clinic since your last visit? Hospitalized since your last visit? \" No    2. \"Have you seen or consulted any other health care providers outside of the [address] [place] [address] since your last visit? \" No     3. For patients aged 39-70: Has the patient had a colonoscopy / FIT/ Cologuard? NA - based on age      If the patient is female:    4. For patients aged 41-77: Has the patient had a mammogram within the past 2 years? Yes - no Care Gap present 07/15/2022      5. For patients aged 21-65: Has the patient had a pap smear?  No hx of hysterectomy
daily, Disp: , Rfl:     OMEGA-3 FATTY ACIDS-VITAMIN E PO, Take 1 capsule by mouth daily, Disp: , Rfl:     cephALEXin (KEFLEX) 500 MG capsule, Take 1 capsule by mouth 3 times daily, Disp: 30 capsule, Rfl: 0    hydroCHLOROthiazide (HYDRODIURIL) 25 MG tablet, TAKE 1 TABLET EVERY DAY, Disp: , Rfl:     meclizine (ANTIVERT) 25 MG tablet, ceived the following from [facility]: Outside name: meclizine (ANTIVERT) 25 mg tablet, Disp: , Rfl:     omeprazole (PRILOSEC) 40 MG delayed release capsule, Take 1 capsule by mouth daily, Disp: , Rfl:     fluticasone (FLONASE) 50 MCG/ACT nasal spray, ceived the following from [facility]: Outside name: fluticasone (FLONASE) 50 mcg/actuation nasal spray, Disp: , Rfl:       Allergies   Allergen Reactions    Bystolic [Nebivolol] Other (comments)     Leg cramps    Norvasc [Amlodipine] Swelling    Pravachol [Pravastatin] Myalgia       Past Medical History:   Diagnosis Date    Alpha thalassemia ([facility] 2/15/2012    Anxiety     BMI 40.0-44.9, adult ([facility] 5/10/2017    Breast tumor     benign cyst    Carotid stenosis, left     LICA <22%    Esophageal stricture     dr. Kevon Wolf, s/p balloon dilation 9/2016    Gastric polyps     GERD (gastroesophageal reflux disease)     endoscopy 7/08- hiatal hernia/gastric polyps, esophagitis 9/2016 on prilosec/carafate    Headache(784.0)     Hypertension     nuclear stress test neg 1/2009    IFG (impaired fasting glucose)     Osteopenia        Social History     Social History    Marital status:      Spouse name: N/A    Number of children: N/A    Years of education: N/A     Occupational History    Not on file.      Social History Main Topics    Smoking status: Never Smoker    Smokeless tobacco: Never Used    Alcohol use No      Comment: \"Often\"    Drug use: No    Sexual activity: Yes     Partners: Male     Other Topics Concern    Not on file     Social History Narrative       Family History   Problem Relation Age of Onset

## 2023-04-07 LAB
BACTERIA SPEC CULT: NORMAL
GRAM STN SPEC: NORMAL
GRAM STN SPEC: NORMAL
SERVICE CMNT-IMP: NORMAL

## 2023-04-08 LAB
SPECIMEN SOURCE: ABNORMAL
VIRUS SPEC CULT: ABNORMAL

## 2023-04-27 ENCOUNTER — HOSPITAL ENCOUNTER (OUTPATIENT)
Facility: HOSPITAL | Age: 78
Discharge: HOME | End: 2023-04-27
Payer: MEDICARE

## 2023-04-27 DIAGNOSIS — R42 DIZZINESS: ICD-10-CM

## 2023-04-27 PROCEDURE — 70551 MRI BRAIN STEM W/O DYE: CPT

## 2023-05-31 ENCOUNTER — OFFICE VISIT (OUTPATIENT)
Age: 78
End: 2023-05-31
Payer: MEDICARE

## 2023-05-31 VITALS
TEMPERATURE: 98.7 F | RESPIRATION RATE: 16 BRPM | SYSTOLIC BLOOD PRESSURE: 150 MMHG | OXYGEN SATURATION: 96 % | BODY MASS INDEX: 34.27 KG/M2 | HEART RATE: 70 BPM | DIASTOLIC BLOOD PRESSURE: 96 MMHG | WEIGHT: 170 LBS | HEIGHT: 59 IN

## 2023-05-31 DIAGNOSIS — R73.01 IFG (IMPAIRED FASTING GLUCOSE): ICD-10-CM

## 2023-05-31 DIAGNOSIS — R90.89 ABNORMAL BRAIN MRI: ICD-10-CM

## 2023-05-31 DIAGNOSIS — E78.2 MIXED HYPERLIPIDEMIA: ICD-10-CM

## 2023-05-31 DIAGNOSIS — R01.1 MURMUR: ICD-10-CM

## 2023-05-31 DIAGNOSIS — I10 WHITE COAT SYNDROME WITH HYPERTENSION: ICD-10-CM

## 2023-05-31 DIAGNOSIS — B00.9 HERPES SIMPLEX INFECTION OF SKIN: Primary | ICD-10-CM

## 2023-05-31 PROCEDURE — G8399 PT W/DXA RESULTS DOCUMENT: HCPCS | Performed by: FAMILY MEDICINE

## 2023-05-31 PROCEDURE — 1036F TOBACCO NON-USER: CPT | Performed by: FAMILY MEDICINE

## 2023-05-31 PROCEDURE — G8427 DOCREV CUR MEDS BY ELIG CLIN: HCPCS | Performed by: FAMILY MEDICINE

## 2023-05-31 PROCEDURE — 99214 OFFICE O/P EST MOD 30 MIN: CPT | Performed by: FAMILY MEDICINE

## 2023-05-31 PROCEDURE — 1123F ACP DISCUSS/DSCN MKR DOCD: CPT | Performed by: FAMILY MEDICINE

## 2023-05-31 PROCEDURE — 1090F PRES/ABSN URINE INCON ASSESS: CPT | Performed by: FAMILY MEDICINE

## 2023-05-31 PROCEDURE — G8417 CALC BMI ABV UP PARAM F/U: HCPCS | Performed by: FAMILY MEDICINE

## 2023-05-31 PROCEDURE — 3080F DIAST BP >= 90 MM HG: CPT | Performed by: FAMILY MEDICINE

## 2023-05-31 PROCEDURE — 3077F SYST BP >= 140 MM HG: CPT | Performed by: FAMILY MEDICINE

## 2023-05-31 NOTE — PROGRESS NOTES
1. \"Have you been to the ER, urgent care clinic since your last visit? Hospitalized since your last visit? \" No    2. \"Have you seen or consulted any other health care providers outside of the [address] [place] [address] since your last visit? \" No     3. For patients aged 39-70: Has the patient had a colonoscopy / FIT/ Cologuard? NA - based on age      If the patient is female:    4. For patients aged 41-77: Has the patient had a mammogram within the past 2 years? NA - based on age or sex      11. For patients aged 21-65: Has the patient had a pap smear?  NA - based on age or sex

## 2023-05-31 NOTE — PROGRESS NOTES
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 She says this has resolved, took valtrex. Past few months, reports increasing frequency on her usual vertigo and daily headaches, bitemporal lasting for hours to days. No aura, N/V, some anxiety/sleep difficulties, no focal neuro deficit. MRI brain shows minimal chronic small vessel changes and partial empty sella with enlarged dural sleeves possible idiopathic intracranial hypertension. Referred her to neuro on last visit, says not yet scheduled, provided #. Headaches about the same on and off, no vision changes. On exam today more pronounced heart murmur. She denies any sob, cp, leg edema. No echo no file. Will order    ROS:  See HPI, all others negative        Patient Active Problem List   Diagnosis    Obesity    Advance directive discussed with patient    Chronic scapular pain    Anxiety    Advance care planning    Adenomatous polyp of colon    Hiatal hernia    GERD (gastroesophageal reflux disease)    IFG (impaired fasting glucose)    Osteopenia    Hyperlipidemia    White coat syndrome with hypertension    Severe obesity with body mass index (BMI) of 35.0 to 39.9 with serious comorbidity (HCC)    Carotid stenosis, left    Trigger finger    Severe obesity (BMI 35.0-39. 9) with comorbidity (HCC)    Herpes simplex infection of skin       Current Outpatient Medications   Medication Sig Dispense Refill    hydrOXYzine pamoate (VISTARIL) 50 MG capsule Take 1 capsule by mouth 3 times daily as needed for Anxiety 30 capsule 2    Multiple Vitamins-Minerals (MULTIVITAMIN ADULTS 50+) TABS Take 1 tablet by mouth daily      OMEGA-3 FATTY ACIDS-VITAMIN E PO Take 1 capsule by mouth daily      fluticasone (FLONASE) 50 MCG/ACT nasal spray ceived the following from [facility]: Outside name: fluticasone (FLONASE) 50 mcg/actuation nasal spray      hydroCHLOROthiazide (HYDRODIURIL) 25 MG tablet TAKE 1

## 2023-06-22 ENCOUNTER — HOSPITAL ENCOUNTER (OUTPATIENT)
Facility: HOSPITAL | Age: 78
Discharge: HOME | End: 2023-06-24
Attending: FAMILY MEDICINE
Payer: MEDICARE

## 2023-06-22 DIAGNOSIS — I10 WHITE COAT SYNDROME WITH HYPERTENSION: ICD-10-CM

## 2023-06-22 DIAGNOSIS — R01.1 MURMUR: ICD-10-CM

## 2023-06-22 LAB
ECHO AO ROOT DIAM: 2.6 CM
ECHO AV AREA PEAK VELOCITY: 1.4 CM2
ECHO AV AREA VTI: 1.5 CM2
ECHO AV MEAN GRADIENT: 8 MMHG
ECHO AV MEAN VELOCITY: 1.3 M/S
ECHO AV PEAK GRADIENT: 15 MMHG
ECHO AV PEAK VELOCITY: 1.9 M/S
ECHO AV VELOCITY RATIO: 0.53
ECHO AV VTI: 45 CM
ECHO LA VOL 2C: 11 ML (ref 22–52)
ECHO LA VOL 4C: 9 ML (ref 22–52)
ECHO LA VOL BP: 10 ML (ref 22–52)
ECHO LA VOLUME AREA LENGTH: 11 ML
ECHO LV E' LATERAL VELOCITY: 8 CM/S
ECHO LV E' SEPTAL VELOCITY: 5 CM/S
ECHO LV FRACTIONAL SHORTENING: 36 % (ref 28–44)
ECHO LV INTERNAL DIMENSION DIASTOLIC: 4.5 CM (ref 3.9–5.3)
ECHO LV INTERNAL DIMENSION SYSTOLIC: 2.9 CM
ECHO LV IVSD: 1.2 CM (ref 0.6–0.9)
ECHO LV MASS 2D: 175 G (ref 67–162)
ECHO LV POSTERIOR WALL DIASTOLIC: 1 CM (ref 0.6–0.9)
ECHO LV RELATIVE WALL THICKNESS RATIO: 0.44
ECHO LVOT AREA: 2.5 CM2
ECHO LVOT AV VTI INDEX: 0.59
ECHO LVOT DIAM: 1.8 CM
ECHO LVOT MEAN GRADIENT: 2 MMHG
ECHO LVOT PEAK GRADIENT: 4 MMHG
ECHO LVOT PEAK VELOCITY: 1 M/S
ECHO LVOT SV: 67.7 ML
ECHO LVOT VTI: 26.6 CM
ECHO MV A VELOCITY: 1.17 M/S
ECHO MV E DECELERATION TIME (DT): 411.4 MS
ECHO MV E VELOCITY: 0.69 M/S
ECHO MV E/A RATIO: 0.59
ECHO MV E/E' LATERAL: 8.63
ECHO MV E/E' RATIO (AVERAGED): 11.21
ECHO MV E/E' SEPTAL: 13.8
ECHO PV MAX VELOCITY: 1 M/S
ECHO PV PEAK GRADIENT: 4 MMHG
ECHO RV FREE WALL PEAK S': 13 CM/S
ECHO RV TAPSE: 2.1 CM (ref 1.7–?)

## 2023-06-22 PROCEDURE — 93308 TTE F-UP OR LMTD: CPT

## 2023-09-21 ENCOUNTER — HOSPITAL ENCOUNTER (OUTPATIENT)
Facility: HOSPITAL | Age: 78
Discharge: HOME | End: 2023-09-21
Payer: MEDICARE

## 2023-09-21 DIAGNOSIS — R73.01 IFG (IMPAIRED FASTING GLUCOSE): ICD-10-CM

## 2023-09-21 DIAGNOSIS — E78.2 MIXED HYPERLIPIDEMIA: ICD-10-CM

## 2023-09-21 DIAGNOSIS — I10 WHITE COAT SYNDROME WITH HYPERTENSION: ICD-10-CM

## 2023-09-21 LAB
ALBUMIN SERPL-MCNC: 3.5 G/DL (ref 3.4–5)
ALBUMIN/GLOB SERPL: 0.9 (ref 0.8–1.7)
ALP SERPL-CCNC: 84 U/L (ref 45–117)
ALT SERPL-CCNC: 20 U/L (ref 13–56)
ANION GAP SERPL CALC-SCNC: 5 MMOL/L (ref 3–18)
AST SERPL-CCNC: 18 U/L (ref 10–38)
BASOPHILS # BLD: 0.1 K/UL (ref 0–0.1)
BASOPHILS NFR BLD: 1 % (ref 0–2)
BILIRUB SERPL-MCNC: 0.6 MG/DL (ref 0.2–1)
BUN SERPL-MCNC: 15 MG/DL (ref 7–18)
BUN/CREAT SERPL: 18 (ref 12–20)
CALCIUM SERPL-MCNC: 9.1 MG/DL (ref 8.5–10.1)
CHLORIDE SERPL-SCNC: 105 MMOL/L (ref 100–111)
CHOLEST SERPL-MCNC: 236 MG/DL
CO2 SERPL-SCNC: 29 MMOL/L (ref 21–32)
CREAT SERPL-MCNC: 0.83 MG/DL (ref 0.6–1.3)
DIFFERENTIAL METHOD BLD: ABNORMAL
EOSINOPHIL # BLD: 0.1 K/UL (ref 0–0.4)
EOSINOPHIL NFR BLD: 2 % (ref 0–5)
ERYTHROCYTE [DISTWIDTH] IN BLOOD BY AUTOMATED COUNT: 17.1 % (ref 11.6–14.5)
EST. AVERAGE GLUCOSE BLD GHB EST-MCNC: 111 MG/DL
GLOBULIN SER CALC-MCNC: 4.1 G/DL (ref 2–4)
GLUCOSE SERPL-MCNC: 106 MG/DL (ref 74–99)
HBA1C MFR BLD: 5.5 % (ref 4.2–5.6)
HCT VFR BLD AUTO: 43.4 % (ref 35–45)
HDLC SERPL-MCNC: 56 MG/DL (ref 40–60)
HDLC SERPL: 4.2 (ref 0–5)
HGB BLD-MCNC: 14 G/DL (ref 12–16)
IMM GRANULOCYTES # BLD AUTO: 0 K/UL (ref 0–0.04)
IMM GRANULOCYTES NFR BLD AUTO: 0 % (ref 0–0.5)
LDLC SERPL CALC-MCNC: 159.8 MG/DL (ref 0–100)
LIPID PANEL: ABNORMAL
LYMPHOCYTES # BLD: 2.2 K/UL (ref 0.9–3.6)
LYMPHOCYTES NFR BLD: 37 % (ref 21–52)
MCH RBC QN AUTO: 24.9 PG (ref 24–34)
MCHC RBC AUTO-ENTMCNC: 32.3 G/DL (ref 31–37)
MCV RBC AUTO: 77.2 FL (ref 78–100)
MONOCYTES # BLD: 0.4 K/UL (ref 0.05–1.2)
MONOCYTES NFR BLD: 7 % (ref 3–10)
NEUTS SEG # BLD: 3.1 K/UL (ref 1.8–8)
NEUTS SEG NFR BLD: 53 % (ref 40–73)
NRBC # BLD: 0 K/UL (ref 0–0.01)
NRBC BLD-RTO: 0 PER 100 WBC
PLATELET # BLD AUTO: 147 K/UL (ref 135–420)
PMV BLD AUTO: 10.7 FL (ref 9.2–11.8)
POTASSIUM SERPL-SCNC: 3.9 MMOL/L (ref 3.5–5.5)
PROT SERPL-MCNC: 7.6 G/DL (ref 6.4–8.2)
RBC # BLD AUTO: 5.62 M/UL (ref 4.2–5.3)
SODIUM SERPL-SCNC: 139 MMOL/L (ref 136–145)
TRIGL SERPL-MCNC: 101 MG/DL
VLDLC SERPL CALC-MCNC: 20.2 MG/DL
WBC # BLD AUTO: 5.8 K/UL (ref 4.6–13.2)

## 2023-09-21 PROCEDURE — 85025 COMPLETE CBC W/AUTO DIFF WBC: CPT

## 2023-09-21 PROCEDURE — 80061 LIPID PANEL: CPT

## 2023-09-21 PROCEDURE — 36415 COLL VENOUS BLD VENIPUNCTURE: CPT

## 2023-09-21 PROCEDURE — 80053 COMPREHEN METABOLIC PANEL: CPT

## 2023-09-21 PROCEDURE — 83036 HEMOGLOBIN GLYCOSYLATED A1C: CPT

## 2023-09-29 NOTE — PROGRESS NOTES
1. \"Have you been to the ER, urgent care clinic since your last visit? Hospitalized since your last visit? \" No    2. \"Have you seen or consulted any other health care providers outside of the [address] [place] [address] since your last visit? \" Dr. Chito Tse     3. For patients aged 43-73: Has the patient had a colonoscopy / FIT/ Cologuard? NA - based on age      If the patient is female:    4. For patients aged 43-66: Has the patient had a mammogram within the past 2 years? NA - based on age or sex      11. For patients aged 21-65: Has the patient had a pap smear?  NA - based on age or sex

## 2023-10-02 ENCOUNTER — OFFICE VISIT (OUTPATIENT)
Age: 78
End: 2023-10-02
Payer: MEDICARE

## 2023-10-02 VITALS
SYSTOLIC BLOOD PRESSURE: 168 MMHG | OXYGEN SATURATION: 98 % | DIASTOLIC BLOOD PRESSURE: 98 MMHG | HEIGHT: 59 IN | TEMPERATURE: 97.1 F | HEART RATE: 64 BPM | WEIGHT: 159 LBS | RESPIRATION RATE: 16 BRPM | BODY MASS INDEX: 32.05 KG/M2

## 2023-10-02 DIAGNOSIS — K21.9 GASTROESOPHAGEAL REFLUX DISEASE, UNSPECIFIED WHETHER ESOPHAGITIS PRESENT: Primary | ICD-10-CM

## 2023-10-02 DIAGNOSIS — G43.109 MIGRAINE WITH VERTIGO: ICD-10-CM

## 2023-10-02 DIAGNOSIS — R77.1 ELEVATED SERUM GLOBULIN LEVEL: ICD-10-CM

## 2023-10-02 DIAGNOSIS — K59.00 CONSTIPATION, UNSPECIFIED CONSTIPATION TYPE: ICD-10-CM

## 2023-10-02 DIAGNOSIS — R73.01 IFG (IMPAIRED FASTING GLUCOSE): ICD-10-CM

## 2023-10-02 DIAGNOSIS — R68.81 EARLY SATIETY: ICD-10-CM

## 2023-10-02 DIAGNOSIS — R63.4 UNINTENTIONAL WEIGHT LOSS: ICD-10-CM

## 2023-10-02 DIAGNOSIS — I10 WHITE COAT SYNDROME WITH HYPERTENSION: ICD-10-CM

## 2023-10-02 DIAGNOSIS — R10.12 LUQ PAIN: ICD-10-CM

## 2023-10-02 DIAGNOSIS — M85.80 OSTEOPENIA, UNSPECIFIED LOCATION: ICD-10-CM

## 2023-10-02 DIAGNOSIS — F41.9 ANXIETY: ICD-10-CM

## 2023-10-02 DIAGNOSIS — E78.2 MIXED HYPERLIPIDEMIA: ICD-10-CM

## 2023-10-02 PROCEDURE — G8417 CALC BMI ABV UP PARAM F/U: HCPCS | Performed by: FAMILY MEDICINE

## 2023-10-02 PROCEDURE — 99214 OFFICE O/P EST MOD 30 MIN: CPT | Performed by: FAMILY MEDICINE

## 2023-10-02 PROCEDURE — G8484 FLU IMMUNIZE NO ADMIN: HCPCS | Performed by: FAMILY MEDICINE

## 2023-10-02 PROCEDURE — 3077F SYST BP >= 140 MM HG: CPT | Performed by: FAMILY MEDICINE

## 2023-10-02 PROCEDURE — G8427 DOCREV CUR MEDS BY ELIG CLIN: HCPCS | Performed by: FAMILY MEDICINE

## 2023-10-02 PROCEDURE — 1123F ACP DISCUSS/DSCN MKR DOCD: CPT | Performed by: FAMILY MEDICINE

## 2023-10-02 PROCEDURE — 1036F TOBACCO NON-USER: CPT | Performed by: FAMILY MEDICINE

## 2023-10-02 PROCEDURE — 3080F DIAST BP >= 90 MM HG: CPT | Performed by: FAMILY MEDICINE

## 2023-10-02 PROCEDURE — G8399 PT W/DXA RESULTS DOCUMENT: HCPCS | Performed by: FAMILY MEDICINE

## 2023-10-02 PROCEDURE — 1090F PRES/ABSN URINE INCON ASSESS: CPT | Performed by: FAMILY MEDICINE

## 2023-10-02 ASSESSMENT — PATIENT HEALTH QUESTIONNAIRE - PHQ9
SUM OF ALL RESPONSES TO PHQ QUESTIONS 1-9: 0
1. LITTLE INTEREST OR PLEASURE IN DOING THINGS: 0
SUM OF ALL RESPONSES TO PHQ QUESTIONS 1-9: 0
2. FEELING DOWN, DEPRESSED OR HOPELESS: 0
SUM OF ALL RESPONSES TO PHQ QUESTIONS 1-9: 0
SUM OF ALL RESPONSES TO PHQ QUESTIONS 1-9: 0
SUM OF ALL RESPONSES TO PHQ9 QUESTIONS 1 & 2: 0

## 2023-10-02 NOTE — PROGRESS NOTES
Jigar Martinez, 78 y.o.,  female    SUBJECTIVE  Routine ff-up and concerns    HTN with white coat-  Taking  HCTZ. Home BP readings 120/80's. She reports being on multiple hypertensives in the past, and cut out BP meds due to hypotension/ and  white coat hypertension. We have compared BP monitors in the past and consistent. Echo 2023 without significant valvular disease, EF 55-60%    HL/prediabetes- says intolerant to lipitor/pravachol pt reports muscle aches. does not want to try any other statin. Previous brain MRI showing minimal presumed chronic microvascular ischemic changes. GERD-currently on prilosec, reports better response on nexium however too costly. EGD 11/2018 reviewed showing hiatal hernia, esophageal dilation. GI following. Reviewed crcs 10/2021 dr. Dima Martínez polyp no further screening indicated  Pt reports past month poor appetite, early satiety, L sided abdominal pain, has had hx colitis in the past. No fever, has chronic constipation says worse past month    Anxiety- reports response to vistaril prn    Migraine with vertigo- says saw dr. Patrick Blackburn, and given migraine/anxiety meds, pt does not recall name. Says headaches/vertigo has improved.      ROS:  See HPI, all others negative        Patient Active Problem List   Diagnosis Code    IFG (impaired fasting glucose) R73.01    Hyperlipidemia E78.5    GERD (gastroesophageal reflux disease) K21.9    Alpha thalassemia (HCC) D56.0    Obesity E66.9    Carotid stenosis, left I65.22    Osteopenia M85.80    Anxiety F41.9    Hypertensive retinopathy H35.039    Essential hypertension I10    Advance directive discussed with patient-DAISY Méndez Z71.89    Chronic scapular pain-intermittent M89.8X1, G89.29    Severe obesity (BMI 35.0-39.9) (HCC) E66.01       Current Outpatient Medications:     hydrOXYzine pamoate (VISTARIL) 50 MG capsule, Take 1 capsule by mouth 3 times daily as needed for Anxiety, Disp: 30 capsule, Rfl: 2

## 2023-10-12 ENCOUNTER — HOSPITAL ENCOUNTER (OUTPATIENT)
Facility: HOSPITAL | Age: 78
Discharge: HOME | End: 2023-10-12
Attending: FAMILY MEDICINE
Payer: MEDICARE

## 2023-10-12 DIAGNOSIS — K21.9 GASTROESOPHAGEAL REFLUX DISEASE, UNSPECIFIED WHETHER ESOPHAGITIS PRESENT: ICD-10-CM

## 2023-10-12 DIAGNOSIS — R63.4 UNINTENTIONAL WEIGHT LOSS: ICD-10-CM

## 2023-10-12 DIAGNOSIS — R10.12 LUQ PAIN: ICD-10-CM

## 2023-10-12 DIAGNOSIS — K59.00 CONSTIPATION, UNSPECIFIED CONSTIPATION TYPE: ICD-10-CM

## 2023-10-12 DIAGNOSIS — R68.81 EARLY SATIETY: ICD-10-CM

## 2023-10-12 PROCEDURE — 6360000004 HC RX CONTRAST MEDICATION: Performed by: FAMILY MEDICINE

## 2023-10-12 PROCEDURE — 74177 CT ABD & PELVIS W/CONTRAST: CPT

## 2023-10-12 RX ADMIN — IOPAMIDOL 100 ML: 612 INJECTION, SOLUTION INTRAVENOUS at 11:17

## 2023-10-16 DIAGNOSIS — R01.1 CARDIAC MURMUR: Primary | ICD-10-CM

## 2023-10-16 DIAGNOSIS — Q25.79 PULMONARY ARTERY ABNORMALITY: ICD-10-CM

## 2023-11-14 ENCOUNTER — TELEPHONE (OUTPATIENT)
Age: 78
End: 2023-11-14

## 2023-11-16 ENCOUNTER — HOSPITAL ENCOUNTER (OUTPATIENT)
Facility: HOSPITAL | Age: 78
Discharge: HOME | End: 2023-11-16
Payer: MEDICARE

## 2023-11-16 LAB — TSH SERPL DL<=0.05 MIU/L-ACNC: 2.67 UIU/ML (ref 0.36–3.74)

## 2023-11-16 PROCEDURE — 36415 COLL VENOUS BLD VENIPUNCTURE: CPT

## 2023-11-16 PROCEDURE — 84165 PROTEIN E-PHORESIS SERUM: CPT

## 2023-11-16 PROCEDURE — 84443 ASSAY THYROID STIM HORMONE: CPT

## 2023-11-16 PROCEDURE — 84155 ASSAY OF PROTEIN SERUM: CPT

## 2023-11-20 LAB
ALBUMIN SERPL ELPH-MCNC: 3.5 G/DL (ref 2.9–4.4)
ALBUMIN/GLOB SERPL: 0.9 (ref 0.7–1.7)
ALPHA1 GLOB SERPL ELPH-MCNC: 0.3 G/DL (ref 0–0.4)
ALPHA2 GLOB SERPL ELPH-MCNC: 0.9 G/DL (ref 0.4–1)
B-GLOBULIN SERPL ELPH-MCNC: 1.1 G/DL (ref 0.7–1.3)
GAMMA GLOB SERPL ELPH-MCNC: 1.8 G/DL (ref 0.4–1.8)
GLOBULIN SER CALC-MCNC: 4.1 G/DL (ref 2.2–3.9)
M PROTEIN SERPL ELPH-MCNC: ABNORMAL G/DL
PROT SERPL-MCNC: 7.6 G/DL (ref 6–8.5)

## 2023-11-28 ENCOUNTER — OFFICE VISIT (OUTPATIENT)
Age: 78
End: 2023-11-28
Payer: MEDICARE

## 2023-11-28 VITALS
SYSTOLIC BLOOD PRESSURE: 136 MMHG | WEIGHT: 154.5 LBS | TEMPERATURE: 97.8 F | HEART RATE: 67 BPM | HEIGHT: 59 IN | BODY MASS INDEX: 31.15 KG/M2 | DIASTOLIC BLOOD PRESSURE: 88 MMHG | RESPIRATION RATE: 16 BRPM | OXYGEN SATURATION: 97 %

## 2023-11-28 DIAGNOSIS — I27.20 PULMONARY HYPERTENSION (HCC): ICD-10-CM

## 2023-11-28 DIAGNOSIS — K21.9 GASTROESOPHAGEAL REFLUX DISEASE, UNSPECIFIED WHETHER ESOPHAGITIS PRESENT: ICD-10-CM

## 2023-11-28 DIAGNOSIS — G43.109 MIGRAINE WITH VERTIGO: ICD-10-CM

## 2023-11-28 DIAGNOSIS — R63.4 WEIGHT LOSS: Primary | ICD-10-CM

## 2023-11-28 DIAGNOSIS — K59.00 CONSTIPATION, UNSPECIFIED CONSTIPATION TYPE: ICD-10-CM

## 2023-11-28 DIAGNOSIS — I10 WHITE COAT SYNDROME WITH HYPERTENSION: ICD-10-CM

## 2023-11-28 DIAGNOSIS — F41.9 ANXIETY: ICD-10-CM

## 2023-11-28 PROCEDURE — G8428 CUR MEDS NOT DOCUMENT: HCPCS | Performed by: FAMILY MEDICINE

## 2023-11-28 PROCEDURE — 1090F PRES/ABSN URINE INCON ASSESS: CPT | Performed by: FAMILY MEDICINE

## 2023-11-28 PROCEDURE — G8399 PT W/DXA RESULTS DOCUMENT: HCPCS | Performed by: FAMILY MEDICINE

## 2023-11-28 PROCEDURE — 1123F ACP DISCUSS/DSCN MKR DOCD: CPT | Performed by: FAMILY MEDICINE

## 2023-11-28 PROCEDURE — 1036F TOBACCO NON-USER: CPT | Performed by: FAMILY MEDICINE

## 2023-11-28 PROCEDURE — G8484 FLU IMMUNIZE NO ADMIN: HCPCS | Performed by: FAMILY MEDICINE

## 2023-11-28 PROCEDURE — 3075F SYST BP GE 130 - 139MM HG: CPT | Performed by: FAMILY MEDICINE

## 2023-11-28 PROCEDURE — 3079F DIAST BP 80-89 MM HG: CPT | Performed by: FAMILY MEDICINE

## 2023-11-28 PROCEDURE — 99214 OFFICE O/P EST MOD 30 MIN: CPT | Performed by: FAMILY MEDICINE

## 2023-11-28 PROCEDURE — G8417 CALC BMI ABV UP PARAM F/U: HCPCS | Performed by: FAMILY MEDICINE

## 2023-11-28 RX ORDER — HYDROCHLOROTHIAZIDE 25 MG/1
25 TABLET ORAL DAILY
Qty: 90 TABLET | Refills: 1 | Status: SHIPPED | OUTPATIENT
Start: 2023-11-28

## 2023-11-28 NOTE — PROGRESS NOTES
Chief Complaint   Patient presents with    Weight Management     Follow up on unintentional weight loss      1. \"Have you been to the ER, urgent care clinic since your last visit? Hospitalized since your last visit? \" No    2. \"Have you seen or consulted any other health care providers outside of the [address] [place] [address] since your last visit? \" No     3. For patients aged 43-73: Has the patient had a colonoscopy / FIT/ Cologuard? NA - based on age      If the patient is female:    4. For patients aged 43-66: Has the patient had a mammogram within the past 2 years? NA - based on age or sex      11. For patients aged 21-65: Has the patient had a pap smear?  NA - based on age or sex hx of hysterectomy

## 2023-11-28 NOTE — PROGRESS NOTES
Joaquin Metcalf, 78 y.o.,  female    SUBJECTIVE  Ff-up    HTN with white coat-  Taking  HCTZ. Home BP readings 120/80's. She reports being on multiple hypertensives in the past, and cut out BP meds due to hypotension/ and  white coat hypertension. We have compared BP monitors in the past and consistent. Echo 2023 without significant valvular disease, EF 55-60%  Checked ct abdomen w/ complaint of weight loss, early satiety incidental finding of pulm HTN,    no sob, chest pain, hx respiratory illness. Has appt with cardiology dr. Niraj Kinney 12/11/2023  TSH/SPEP tests negative    GERD-currently on prilosec, reports better response on nexium however too costly. EGD 11/2018 reviewed showing hiatal hernia, esophageal dilation. GI following. Reviewed crcs 10/2021 dr. Zeus Sunshine polyp no further screening indicated  Says has followed up with GI, and was given laxatives for herchronic constipation, no plan for repeat EGD soon    Anxiety- reports response to vistaril prn    Migraine with vertigo- says saw dr. Tosha Rosales, and given migraine/anxiety med lexapro she decided against taking, wants to continue with prn hydroxyzine for now for anxiety. Says headaches/vertigo has improved.      ROS:  See HPI, all others negative        Patient Active Problem List   Diagnosis Code    IFG (impaired fasting glucose) R73.01    Hyperlipidemia E78.5    GERD (gastroesophageal reflux disease) K21.9    Alpha thalassemia (HCC) D56.0    Obesity E66.9    Carotid stenosis, left I65.22    Osteopenia M85.80    Anxiety F41.9    Hypertensive retinopathy H35.039    Essential hypertension I10    Advance directive discussed with patient-DAISY Monae Z71.89    Chronic scapular pain-intermittent M89.8X1, G89.29    Severe obesity (BMI 35.0-39.9) (HCC) E66.01       Current Outpatient Medications:     hydroCHLOROthiazide (HYDRODIURIL) 25 MG tablet, Take 1 tablet by mouth daily, Disp: 90 tablet, Rfl: 1    hydrOXYzine pamoate (VISTARIL)

## 2024-01-15 ENCOUNTER — OFFICE VISIT (OUTPATIENT)
Age: 79
End: 2024-01-15
Payer: MEDICARE

## 2024-01-15 VITALS
BODY MASS INDEX: 30.7 KG/M2 | WEIGHT: 152 LBS | HEART RATE: 73 BPM | DIASTOLIC BLOOD PRESSURE: 80 MMHG | SYSTOLIC BLOOD PRESSURE: 180 MMHG | OXYGEN SATURATION: 97 %

## 2024-01-15 DIAGNOSIS — R01.1 MURMUR: Primary | ICD-10-CM

## 2024-01-15 PROCEDURE — G8399 PT W/DXA RESULTS DOCUMENT: HCPCS | Performed by: INTERNAL MEDICINE

## 2024-01-15 PROCEDURE — 1090F PRES/ABSN URINE INCON ASSESS: CPT | Performed by: INTERNAL MEDICINE

## 2024-01-15 PROCEDURE — G8428 CUR MEDS NOT DOCUMENT: HCPCS | Performed by: INTERNAL MEDICINE

## 2024-01-15 PROCEDURE — G8484 FLU IMMUNIZE NO ADMIN: HCPCS | Performed by: INTERNAL MEDICINE

## 2024-01-15 PROCEDURE — 3077F SYST BP >= 140 MM HG: CPT | Performed by: INTERNAL MEDICINE

## 2024-01-15 PROCEDURE — 1036F TOBACCO NON-USER: CPT | Performed by: INTERNAL MEDICINE

## 2024-01-15 PROCEDURE — 1123F ACP DISCUSS/DSCN MKR DOCD: CPT | Performed by: INTERNAL MEDICINE

## 2024-01-15 PROCEDURE — G8417 CALC BMI ABV UP PARAM F/U: HCPCS | Performed by: INTERNAL MEDICINE

## 2024-01-15 PROCEDURE — 93000 ELECTROCARDIOGRAM COMPLETE: CPT | Performed by: INTERNAL MEDICINE

## 2024-01-15 PROCEDURE — 99204 OFFICE O/P NEW MOD 45 MIN: CPT | Performed by: INTERNAL MEDICINE

## 2024-01-15 PROCEDURE — 3079F DIAST BP 80-89 MM HG: CPT | Performed by: INTERNAL MEDICINE

## 2024-01-15 RX ORDER — PITAVASTATIN 2 MG/1
2 TABLET, FILM COATED ORAL NIGHTLY
Qty: 90 TABLET | Refills: 2 | Status: SHIPPED | OUTPATIENT
Start: 2024-01-15

## 2024-01-15 NOTE — PROGRESS NOTES
Identified pt with two pt identifiers(name and ). Reviewed record in preparation for visit and have obtained necessary documentation.    Hellen Lee presents today for   Chief Complaint   Patient presents with    New Patient     Murmur       Pt denied  DIZZINESS, SOB, CHEST PAIN/ PRESSURE, FATIGUE/WEAKNESS, HEADACHES, SWELLING.             Hellen Lee preferred language for health care discussion is english/other.    Personal Protective Equipment:   Personal Protective Equipment was used including: mask-surgical and hands-gloves. Patient was placed on no precaution(s). Patient was not masked.    Precautions:   Patient currently on None  Patient currently roomed with door closed.    Is someone accompanying this pt?     Is the patient using any DME equipment during OV? no    Depression Screening:      10/2/2023    11:47 AM 2023    11:02 AM 3/3/2022    10:23 AM 2021    12:51 PM 7/15/2021     3:43 PM 6/3/2021     9:04 AM 3/1/2021    10:59 AM   PHQ-9 Questionaire   Little interest or pleasure in doing things 0 0 0 0 0 0 0   Feeling down, depressed, or hopeless 0 0 1 0 0 0 1   Trouble falling or staying asleep, or sleeping too much  0        Feeling tired or having little energy  0        Poor appetite or overeating  0        Feeling bad about yourself - or that you are a failure or have let yourself or your family down  0        Trouble concentrating on things, such as reading the newspaper or watching television  0        Moving or speaking so slowly that other people could have noticed. Or the opposite - being so fidgety or restless that you have been moving around a lot more than usual  0        Thoughts that you would be better off dead, or of hurting yourself in some way  0        PHQ-9 Total Score 0 0 1 0 0 0 1   If you checked off any problems, how difficult have these problems made it for you to do your work, take care of things at home, or get along with other people?  0

## 2024-01-15 NOTE — PROGRESS NOTES
[facility]    Hellen Lee is 78 y.o. female     Patient is here today for cardiac evaluation  Denies prior history of MI or CHF  Patient was sent for the evaluation of probable pulmonary hypertension based on CT finding  Patient had a CT abdomen in 10/2023 which showed dilated pulmonary artery likely from pulmonary hypertension  Patient had echocardiogram in 06/2023 which did not show any obvious pulmonary hypertension  Patient denies history of sleep apnea or reactive airway disease or COPD or any smoking in the past  She does not appear to be having any symptoms concerning for angina or heart failure.  She does have whitecoat syndrome.  She has no palpitation, presyncope or syncope  Denies any nausea, vomiting, abdominal pain, fever, chills, sputum production. No hematuria or other bleeding complaints    Past Medical History:   Diagnosis Date    Alpha thalassemia (HCC) 2/15/2012    Anxiety     BMI 40.0-44.9, adult (HCC) 5/10/2017    Breast tumor     benign cyst    Carotid stenosis, left     LICA <50%    Esophageal stricture     dr. mathias, s/p balloon dilation 9/2016    Gastric polyps     GERD (gastroesophageal reflux disease)     endoscopy 7/08- hiatal hernia/gastric polyps, esophagitis 9/2016 on prilosec/carafate    Headache(784.0)     Hx of colonoscopy 05/26/2015    tubular adenoma, rpt 3 years 2018 dr. mathias    Hypertension     nuclear stress test neg 1/2009    IFG (impaired fasting glucose)     Osteopenia     Reflux esophagitis 11/06/2018    EGD, dr. vickers       Review of Systems:  Cardiac symptoms as noted above in HPI. All others negative.  Denies fatigue, malaise, skin rash, joint pain, blurring vision, photophobia, neck pain, hemoptysis, chronic cough, nausea, vomiting, hematuria, burning micturition, BRBPR, chronic headaches.    Current Outpatient Medications   Medication Sig    hydroCHLOROthiazide (HYDRODIURIL) 25 MG tablet

## 2024-02-13 RX ORDER — PITAVASTATIN 2 MG/1
2 TABLET, FILM COATED ORAL NIGHTLY
Qty: 90 TABLET | Refills: 2 | Status: SHIPPED | OUTPATIENT
Start: 2024-02-13

## 2024-02-20 ENCOUNTER — HOSPITAL ENCOUNTER (OUTPATIENT)
Facility: HOSPITAL | Age: 79
Discharge: HOME | End: 2024-02-23
Payer: MEDICARE

## 2024-02-20 DIAGNOSIS — R63.4 WEIGHT LOSS: ICD-10-CM

## 2024-02-20 LAB
ALBUMIN SERPL-MCNC: 3.5 G/DL (ref 3.4–5)
ALBUMIN/GLOB SERPL: 0.8 (ref 0.8–1.7)
ALP SERPL-CCNC: 84 U/L (ref 45–117)
ALT SERPL-CCNC: 22 U/L (ref 13–56)
ANION GAP SERPL CALC-SCNC: 5 MMOL/L (ref 3–18)
AST SERPL-CCNC: 20 U/L (ref 10–38)
BASOPHILS # BLD: 0.1 K/UL (ref 0–0.1)
BASOPHILS NFR BLD: 1 % (ref 0–2)
BILIRUB SERPL-MCNC: 0.9 MG/DL (ref 0.2–1)
BUN SERPL-MCNC: 11 MG/DL (ref 7–18)
BUN/CREAT SERPL: 14 (ref 12–20)
CALCIUM SERPL-MCNC: 9.3 MG/DL (ref 8.5–10.1)
CHLORIDE SERPL-SCNC: 107 MMOL/L (ref 100–111)
CO2 SERPL-SCNC: 29 MMOL/L (ref 21–32)
CREAT SERPL-MCNC: 0.8 MG/DL (ref 0.6–1.3)
DIFFERENTIAL METHOD BLD: ABNORMAL
EOSINOPHIL # BLD: 0.1 K/UL (ref 0–0.4)
EOSINOPHIL NFR BLD: 2 % (ref 0–5)
ERYTHROCYTE [DISTWIDTH] IN BLOOD BY AUTOMATED COUNT: 15.9 % (ref 11.6–14.5)
GLOBULIN SER CALC-MCNC: 4.3 G/DL (ref 2–4)
GLUCOSE SERPL-MCNC: 86 MG/DL (ref 74–99)
HCT VFR BLD AUTO: 45.9 % (ref 35–45)
HGB BLD-MCNC: 14.4 G/DL (ref 12–16)
IMM GRANULOCYTES # BLD AUTO: 0 K/UL (ref 0–0.04)
IMM GRANULOCYTES NFR BLD AUTO: 0 % (ref 0–0.5)
LYMPHOCYTES # BLD: 2.5 K/UL (ref 0.9–3.6)
LYMPHOCYTES NFR BLD: 37 % (ref 21–52)
MCH RBC QN AUTO: 24.9 PG (ref 24–34)
MCHC RBC AUTO-ENTMCNC: 31.4 G/DL (ref 31–37)
MCV RBC AUTO: 79.3 FL (ref 78–100)
MONOCYTES # BLD: 0.5 K/UL (ref 0.05–1.2)
MONOCYTES NFR BLD: 7 % (ref 3–10)
NEUTS SEG # BLD: 3.6 K/UL (ref 1.8–8)
NEUTS SEG NFR BLD: 53 % (ref 40–73)
NRBC # BLD: 0 K/UL (ref 0–0.01)
NRBC BLD-RTO: 0 PER 100 WBC
PLATELET # BLD AUTO: 153 K/UL (ref 135–420)
PMV BLD AUTO: 12 FL (ref 9.2–11.8)
POTASSIUM SERPL-SCNC: 4.7 MMOL/L (ref 3.5–5.5)
PROT SERPL-MCNC: 7.8 G/DL (ref 6.4–8.2)
RBC # BLD AUTO: 5.79 M/UL (ref 4.2–5.3)
SODIUM SERPL-SCNC: 141 MMOL/L (ref 136–145)
WBC # BLD AUTO: 6.7 K/UL (ref 4.6–13.2)

## 2024-02-20 PROCEDURE — 85025 COMPLETE CBC W/AUTO DIFF WBC: CPT

## 2024-02-20 PROCEDURE — 80053 COMPREHEN METABOLIC PANEL: CPT

## 2024-02-20 PROCEDURE — 36415 COLL VENOUS BLD VENIPUNCTURE: CPT

## 2024-02-27 NOTE — PROGRESS NOTES
\"Have you been to the ER, urgent care clinic since your last visit?  Hospitalized since your last visit?\"    NO    “Have you seen or consulted any other health care providers outside of [facility] since your last visit?”    NO

## 2024-02-28 ENCOUNTER — OFFICE VISIT (OUTPATIENT)
Age: 79
End: 2024-02-28
Payer: MEDICARE

## 2024-02-28 VITALS
HEART RATE: 78 BPM | TEMPERATURE: 98.2 F | OXYGEN SATURATION: 97 % | WEIGHT: 151 LBS | DIASTOLIC BLOOD PRESSURE: 86 MMHG | BODY MASS INDEX: 30.44 KG/M2 | SYSTOLIC BLOOD PRESSURE: 176 MMHG | RESPIRATION RATE: 16 BRPM | HEIGHT: 59 IN

## 2024-02-28 DIAGNOSIS — D56.0 ALPHA THALASSEMIA (HCC): ICD-10-CM

## 2024-02-28 DIAGNOSIS — K21.9 GASTROESOPHAGEAL REFLUX DISEASE, UNSPECIFIED WHETHER ESOPHAGITIS PRESENT: ICD-10-CM

## 2024-02-28 DIAGNOSIS — I10 WHITE COAT SYNDROME WITH HYPERTENSION: ICD-10-CM

## 2024-02-28 DIAGNOSIS — I27.20 PULMONARY HYPERTENSION (HCC): ICD-10-CM

## 2024-02-28 DIAGNOSIS — F41.9 ANXIETY: Primary | ICD-10-CM

## 2024-02-28 DIAGNOSIS — R63.4 WEIGHT LOSS: ICD-10-CM

## 2024-02-28 PROBLEM — Z71.89 ADVANCE CARE PLANNING: Status: RESOLVED | Noted: 2018-03-19 | Resolved: 2024-02-28

## 2024-02-28 PROBLEM — Z71.89 ADVANCE DIRECTIVE DISCUSSED WITH PATIENT: Status: RESOLVED | Noted: 2017-03-27 | Resolved: 2024-02-28

## 2024-02-28 PROBLEM — E66.01 SEVERE OBESITY (BMI 35.0-39.9) WITH COMORBIDITY (HCC): Status: RESOLVED | Noted: 2023-04-05 | Resolved: 2024-02-28

## 2024-02-28 PROCEDURE — G8399 PT W/DXA RESULTS DOCUMENT: HCPCS | Performed by: FAMILY MEDICINE

## 2024-02-28 PROCEDURE — 1090F PRES/ABSN URINE INCON ASSESS: CPT | Performed by: FAMILY MEDICINE

## 2024-02-28 PROCEDURE — 3079F DIAST BP 80-89 MM HG: CPT | Performed by: FAMILY MEDICINE

## 2024-02-28 PROCEDURE — G8427 DOCREV CUR MEDS BY ELIG CLIN: HCPCS | Performed by: FAMILY MEDICINE

## 2024-02-28 PROCEDURE — G8417 CALC BMI ABV UP PARAM F/U: HCPCS | Performed by: FAMILY MEDICINE

## 2024-02-28 PROCEDURE — 99214 OFFICE O/P EST MOD 30 MIN: CPT | Performed by: FAMILY MEDICINE

## 2024-02-28 PROCEDURE — 1123F ACP DISCUSS/DSCN MKR DOCD: CPT | Performed by: FAMILY MEDICINE

## 2024-02-28 PROCEDURE — G8484 FLU IMMUNIZE NO ADMIN: HCPCS | Performed by: FAMILY MEDICINE

## 2024-02-28 PROCEDURE — 3077F SYST BP >= 140 MM HG: CPT | Performed by: FAMILY MEDICINE

## 2024-02-28 PROCEDURE — 1036F TOBACCO NON-USER: CPT | Performed by: FAMILY MEDICINE

## 2024-02-28 ASSESSMENT — PATIENT HEALTH QUESTIONNAIRE - PHQ9
SUM OF ALL RESPONSES TO PHQ9 QUESTIONS 1 & 2: 0
2. FEELING DOWN, DEPRESSED OR HOPELESS: 0
SUM OF ALL RESPONSES TO PHQ QUESTIONS 1-9: 0
1. LITTLE INTEREST OR PLEASURE IN DOING THINGS: 0
SUM OF ALL RESPONSES TO PHQ QUESTIONS 1-9: 0
SUM OF ALL RESPONSES TO PHQ QUESTIONS 1-9: 0

## 2024-02-28 NOTE — PROGRESS NOTES
Hellen Betty Lee, 78 y.o.,  female    SUBJECTIVE  Ff-up    HTN with white coat-  Taking  HCTZ. Home BP readings 120/80's. She reports being on multiple hypertensives in the past, and cut out BP meds due to hypotension/ and  white coat hypertension.  We have compared BP monitors in the past and consistent.   Echo 2023 without significant valvular disease, EF 55-60%  Checked ct abdomen w/ complaint of weight loss, early satiety incidental finding of pulm HTN,    no sob, chest pain, hx respiratory illness. Has appt with cardiology dr. SUSAN roberts and plan to monitor Echo in 3-6 months.   TSH/SPEP tests negative  Still has not scheduled with GI dr. Briceño for further evaluation, her weight has remained stable    GERD-currently on prilosec, reports better response on nexium however too costly.   EGD 11/2018 reviewed showing hiatal hernia, esophageal dilation. GI following.  Reviewed crcs 10/2021 dr. Briceño,hyperplastic polyp no further screening indicated    Anxiety- reports response to vistaril prn    Migraine with vertigo- says saw dr. Goldberg, and given migraine/anxiety med lexapro she decided against taking, wants to continue with prn hydroxyzine for now for anxiety. Says headaches/vertigo has improved.     ROS:  See HPI, all others negative        Patient Active Problem List   Diagnosis Code    IFG (impaired fasting glucose) R73.01    Hyperlipidemia E78.5    GERD (gastroesophageal reflux disease) K21.9    Alpha thalassemia (HCC) D56.0    Obesity E66.9    Carotid stenosis, left I65.22    Osteopenia M85.80    Anxiety F41.9    Hypertensive retinopathy H35.039    Essential hypertension I10    Advance directive discussed with patient-DAISY Lee Z71.89    Chronic scapular pain-intermittent M89.8X1, G89.29    Severe obesity (BMI 35.0-39.9) (HCC) E66.01       Current Outpatient Medications:     pitavastatin (LIVALO) 2 MG TABS tablet, Take 1 tablet by mouth nightly, Disp: 90 tablet, Rfl: 2    hydroCHLOROthiazide

## 2024-03-04 ENCOUNTER — TELEPHONE (OUTPATIENT)
Age: 79
End: 2024-03-04

## 2024-03-04 NOTE — TELEPHONE ENCOUNTER
Insurance is refusing to pay for Pitavastatin 2mg, they suggested patient (Hellen) be place on simvastatin or ezetimibe that it what insurance is will to pay for.

## 2024-03-11 RX ORDER — SIMVASTATIN 20 MG
20 TABLET ORAL NIGHTLY
Qty: 30 TABLET | Refills: 2 | Status: SHIPPED | OUTPATIENT
Start: 2024-03-11

## 2024-03-11 NOTE — TELEPHONE ENCOUNTER
PCP: Nati Wharton MD    Last appt:  1/15/2024   Future Appointments   Date Time Provider Department Center   3/15/2024 10:00 AM [facility] 1 [facility]   5/29/2024 10:45 AM Nati Wharton MD [facility] AMB   6/5/2024 11:30 AM [facility] ECHO GE 95 [facility] AMB       Requested Prescriptions     Pending Prescriptions Disp Refills    simvastatin (ZOCOR) 20 MG tablet 30 tablet 0     Sig: Take 1 tablet by mouth nightly Appt overdue.           Request for a 30 or 90 day supply? Provider Discretion    Pharmacy: confirmed    Other Comments:Per insurance will only cover simvastatin and zetia    Verbal order with read back REENA Mcdonald  Simvastatin 20 mg tab daily

## 2024-03-15 ENCOUNTER — HOSPITAL ENCOUNTER (OUTPATIENT)
Facility: HOSPITAL | Age: 79
End: 2024-03-15
Attending: INTERNAL MEDICINE
Payer: MEDICARE

## 2024-03-15 DIAGNOSIS — R63.4 ABNORMAL WEIGHT LOSS: ICD-10-CM

## 2024-03-15 LAB
VAS AORTA DIST AP: 1.19 CM
VAS AORTA DIST PSV: 132.7 CM/S
VAS AORTA DIST PSV: 132.7 CM/S
VAS AORTA DIST PSV: 16.1 CM/S
VAS AORTA DIST TR: 1.28 CM
VAS AORTA MID AP: 1.32 CM
VAS AORTA MID PSV: 127.5 CM/S
VAS AORTA MID PSV: 143.1 CM/S
VAS AORTA MID PSV: 143.1 CM/S
VAS AORTA MID TRANS: 1.42 CM
VAS AORTA PROX AP: 1.49 CM
VAS AORTA PROX PSV: 85.7 CM/S
VAS AORTA PROX TR: 1.43 CM
VAS CELIAC EDV: 25.5 CM/S
VAS CELIAC PSV: 181.3 CM/S
VAS COMMON HEPATIC EDV: 7.9 CM/S
VAS COMMON HEPATIC PSV: 161.5 CM/S
VAS DIST SMA EDV: 15.6 CM/S
VAS DIST SMA PSV: 101.7 CM/S
VAS IMA PSV: 130.7 CM/S
VAS LEFT COM ILIAC PROX PSV: 141.8 CM/S
VAS MID SMA EDV: 15.7 CM/S
VAS MID SMA PSV: 114.3 CM/S
VAS ORIGIN SMA EDV: 15.6 CM/S
VAS ORIGIN SMA PSV: 190.6 CM/S
VAS PROX SMA EDV: 15.6 CM/S
VAS PROX SMA PSV: 190.7 CM/S
VAS RIGHT COM ILIAC PROX PSV: 149.6 CM/S
VAS SPLENIC EDV: 34.3 CM/S
VAS SPLENIC PSV: 196 CM/S

## 2024-03-15 PROCEDURE — 93976 VASCULAR STUDY: CPT

## 2024-03-15 PROCEDURE — 93976 VASCULAR STUDY: CPT | Performed by: INTERNAL MEDICINE

## 2024-07-18 ENCOUNTER — HOSPITAL ENCOUNTER (OUTPATIENT)
Facility: HOSPITAL | Age: 79
Discharge: HOME | End: 2024-07-18
Payer: MEDICARE

## 2024-07-18 DIAGNOSIS — R63.4 WEIGHT LOSS: ICD-10-CM

## 2024-07-18 LAB
ALBUMIN SERPL-MCNC: 3.6 G/DL (ref 3.4–5)
ALBUMIN/GLOB SERPL: 0.8 (ref 0.8–1.7)
ALP SERPL-CCNC: 98 U/L (ref 45–117)
ALT SERPL-CCNC: 20 U/L (ref 13–56)
ANION GAP SERPL CALC-SCNC: ABNORMAL MMOL/L (ref 3–18)
AST SERPL-CCNC: 19 U/L (ref 10–38)
BASOPHILS # BLD: 0.1 K/UL (ref 0–0.1)
BASOPHILS NFR BLD: 1 % (ref 0–2)
BILIRUB SERPL-MCNC: 0.7 MG/DL (ref 0.2–1)
BUN SERPL-MCNC: 7 MG/DL (ref 7–18)
BUN/CREAT SERPL: 9 (ref 12–20)
CALCIUM SERPL-MCNC: 9.5 MG/DL (ref 8.5–10.1)
CHLORIDE SERPL-SCNC: 107 MMOL/L (ref 100–111)
CO2 SERPL-SCNC: 31 MMOL/L (ref 21–32)
CREAT SERPL-MCNC: 0.76 MG/DL (ref 0.6–1.3)
DIFFERENTIAL METHOD BLD: ABNORMAL
EOSINOPHIL # BLD: 0.1 K/UL (ref 0–0.4)
EOSINOPHIL NFR BLD: 1 % (ref 0–5)
ERYTHROCYTE [DISTWIDTH] IN BLOOD BY AUTOMATED COUNT: 15.9 % (ref 11.6–14.5)
GLOBULIN SER CALC-MCNC: 4.4 G/DL (ref 2–4)
GLUCOSE SERPL-MCNC: 109 MG/DL (ref 74–99)
HCT VFR BLD AUTO: 44.7 % (ref 35–45)
HGB BLD-MCNC: 14.2 G/DL (ref 12–16)
IMM GRANULOCYTES # BLD AUTO: 0 K/UL (ref 0–0.04)
IMM GRANULOCYTES NFR BLD AUTO: 0 % (ref 0–0.5)
LYMPHOCYTES # BLD: 1.9 K/UL (ref 0.9–3.6)
LYMPHOCYTES NFR BLD: 39 % (ref 21–52)
MCH RBC QN AUTO: 25.1 PG (ref 24–34)
MCHC RBC AUTO-ENTMCNC: 31.8 G/DL (ref 31–37)
MCV RBC AUTO: 79 FL (ref 78–100)
MONOCYTES # BLD: 0.4 K/UL (ref 0.05–1.2)
MONOCYTES NFR BLD: 8 % (ref 3–10)
NEUTS SEG # BLD: 2.5 K/UL (ref 1.8–8)
NEUTS SEG NFR BLD: 51 % (ref 40–73)
NRBC # BLD: 0 K/UL (ref 0–0.01)
NRBC BLD-RTO: 0 PER 100 WBC
PLATELET # BLD AUTO: 171 K/UL (ref 135–420)
PMV BLD AUTO: 11.9 FL (ref 9.2–11.8)
POTASSIUM SERPL-SCNC: 3.8 MMOL/L (ref 3.5–5.5)
PROT SERPL-MCNC: 8 G/DL (ref 6.4–8.2)
RBC # BLD AUTO: 5.66 M/UL (ref 4.2–5.3)
SODIUM SERPL-SCNC: 136 MMOL/L (ref 136–145)
WBC # BLD AUTO: 4.8 K/UL (ref 4.6–13.2)

## 2024-07-18 PROCEDURE — 36415 COLL VENOUS BLD VENIPUNCTURE: CPT

## 2024-07-18 PROCEDURE — 85025 COMPLETE CBC W/AUTO DIFF WBC: CPT

## 2024-07-18 PROCEDURE — 80053 COMPREHEN METABOLIC PANEL: CPT

## 2024-07-25 ENCOUNTER — OFFICE VISIT (OUTPATIENT)
Facility: CLINIC | Age: 79
End: 2024-07-25
Payer: MEDICARE

## 2024-07-25 VITALS
SYSTOLIC BLOOD PRESSURE: 180 MMHG | DIASTOLIC BLOOD PRESSURE: 90 MMHG | BODY MASS INDEX: 29.43 KG/M2 | WEIGHT: 146 LBS | RESPIRATION RATE: 16 BRPM | OXYGEN SATURATION: 98 % | TEMPERATURE: 97.3 F | HEART RATE: 65 BPM | HEIGHT: 59 IN

## 2024-07-25 DIAGNOSIS — E78.2 MIXED HYPERLIPIDEMIA: ICD-10-CM

## 2024-07-25 DIAGNOSIS — R73.01 IFG (IMPAIRED FASTING GLUCOSE): Primary | ICD-10-CM

## 2024-07-25 DIAGNOSIS — I10 WHITE COAT SYNDROME WITH HYPERTENSION: ICD-10-CM

## 2024-07-25 DIAGNOSIS — K21.9 GASTROESOPHAGEAL REFLUX DISEASE, UNSPECIFIED WHETHER ESOPHAGITIS PRESENT: ICD-10-CM

## 2024-07-25 DIAGNOSIS — D56.0 ALPHA THALASSEMIA (HCC): ICD-10-CM

## 2024-07-25 DIAGNOSIS — I27.20 PULMONARY HYPERTENSION (HCC): ICD-10-CM

## 2024-07-25 DIAGNOSIS — F41.9 ANXIETY: ICD-10-CM

## 2024-07-25 PROCEDURE — 3077F SYST BP >= 140 MM HG: CPT | Performed by: FAMILY MEDICINE

## 2024-07-25 PROCEDURE — 1090F PRES/ABSN URINE INCON ASSESS: CPT | Performed by: FAMILY MEDICINE

## 2024-07-25 PROCEDURE — G8417 CALC BMI ABV UP PARAM F/U: HCPCS | Performed by: FAMILY MEDICINE

## 2024-07-25 PROCEDURE — G8399 PT W/DXA RESULTS DOCUMENT: HCPCS | Performed by: FAMILY MEDICINE

## 2024-07-25 PROCEDURE — 1123F ACP DISCUSS/DSCN MKR DOCD: CPT | Performed by: FAMILY MEDICINE

## 2024-07-25 PROCEDURE — 99214 OFFICE O/P EST MOD 30 MIN: CPT | Performed by: FAMILY MEDICINE

## 2024-07-25 PROCEDURE — G8427 DOCREV CUR MEDS BY ELIG CLIN: HCPCS | Performed by: FAMILY MEDICINE

## 2024-07-25 PROCEDURE — 1036F TOBACCO NON-USER: CPT | Performed by: FAMILY MEDICINE

## 2024-07-25 PROCEDURE — 3080F DIAST BP >= 90 MM HG: CPT | Performed by: FAMILY MEDICINE

## 2024-07-25 RX ORDER — VALACYCLOVIR HYDROCHLORIDE 500 MG/1
500 TABLET, FILM COATED ORAL 2 TIMES DAILY
Qty: 14 TABLET | Refills: 5 | Status: SHIPPED | OUTPATIENT
Start: 2024-07-25 | End: 2024-08-01

## 2024-07-25 NOTE — PROGRESS NOTES
\"Have you been to the ER, urgent care clinic since your last visit?  Hospitalized since your last visit?\"    NO    “Have you seen or consulted any other health care providers outside of [facility] since your last visit?”    NO            Click Here for Release of Records Request  
Topics    Smoking status: Never Smoker    Smokeless tobacco: Never Used    Alcohol use No      Comment: \"Often\"    Drug use: No    Sexual activity: Yes     Partners: Male     Other Topics Concern    Not on file     Social History Narrative       Family History   Problem Relation Age of Onset    Cancer Mother          OBJECTIVE    Physical Exam:     BP (!) 180/90 (Site: Right Upper Arm, Position: Sitting, Cuff Size: Medium Adult)   Pulse 65   Temp 97.3 °F (36.3 °C) (Skin)   Resp 16   Ht 1.499 m (4' 11\")   Wt 66.2 kg (146 lb)   SpO2 98%   BMI 29.49 kg/m²     General: alert, n no apparent distress or pain  CVS: normal rate, regular rhythm, + murmur  Lungs:clear to ausculation bilaterally, no crackles, wheezing or rhonchi noted  Abdomen: soft, NT, ND  Extremities: no edema  Skin: no rash  Psych:  mood and affect normal  CMP:   Lab Results   Component Value Date/Time     07/18/2024 09:33 AM    K 3.8 07/18/2024 09:33 AM     07/18/2024 09:33 AM    CO2 31 07/18/2024 09:33 AM    BUN 7 07/18/2024 09:33 AM    CREATININE 0.76 07/18/2024 09:33 AM    GLUCOSE 109 07/18/2024 09:33 AM    CALCIUM 9.5 07/18/2024 09:33 AM    BILITOT 0.7 07/18/2024 09:33 AM    AST 19 07/18/2024 09:33 AM    ALT 20 07/18/2024 09:33 AM        CBC:   Lab Results   Component Value Date/Time    WBC 4.8 07/18/2024 09:33 AM    RBC 5.66 07/18/2024 09:33 AM    HGB 14.2 07/18/2024 09:33 AM    HCT 44.7 07/18/2024 09:33 AM    MCV 79.0 07/18/2024 09:33 AM    MCH 25.1 07/18/2024 09:33 AM    MCHC 31.8 07/18/2024 09:33 AM    RDW 15.9 07/18/2024 09:33 AM     07/18/2024 09:33 AM    MPV 11.9 07/18/2024 09:33 AM        Lipids   Lab Results   Component Value Date/Time    CHOL 236 09/21/2023 08:36 AM    TRIG 101 09/21/2023 08:36 AM    HDL 56 09/21/2023 08:36 AM    CHOLHDLRATIO 4.2 09/21/2023 08:36 AM         Imaging results last 24 hrs :No results found.    Imaging results impression onlyNo results found.   No orders to display       A1c:

## 2024-10-22 ENCOUNTER — HOSPITAL ENCOUNTER (OUTPATIENT)
Facility: HOSPITAL | Age: 79
Discharge: HOME | End: 2024-10-25
Payer: MEDICARE

## 2024-10-22 DIAGNOSIS — E78.2 MIXED HYPERLIPIDEMIA: ICD-10-CM

## 2024-10-22 DIAGNOSIS — R73.01 IFG (IMPAIRED FASTING GLUCOSE): ICD-10-CM

## 2024-10-22 LAB
ALBUMIN SERPL-MCNC: 3.7 G/DL (ref 3.4–5)
ALBUMIN/GLOB SERPL: 0.9 (ref 0.8–1.7)
ALP SERPL-CCNC: 85 U/L (ref 45–117)
ALT SERPL-CCNC: 21 U/L (ref 13–56)
ANION GAP SERPL CALC-SCNC: 5 MMOL/L (ref 3–18)
AST SERPL-CCNC: 16 U/L (ref 10–38)
BILIRUB SERPL-MCNC: 0.7 MG/DL (ref 0.2–1)
BUN SERPL-MCNC: 11 MG/DL (ref 7–18)
BUN/CREAT SERPL: 14 (ref 12–20)
CALCIUM SERPL-MCNC: 9.4 MG/DL (ref 8.5–10.1)
CHLORIDE SERPL-SCNC: 105 MMOL/L (ref 100–111)
CHOLEST SERPL-MCNC: 220 MG/DL
CO2 SERPL-SCNC: 29 MMOL/L (ref 21–32)
CREAT SERPL-MCNC: 0.76 MG/DL (ref 0.6–1.3)
EST. AVERAGE GLUCOSE BLD GHB EST-MCNC: 114 MG/DL
GLOBULIN SER CALC-MCNC: 4 G/DL (ref 2–4)
GLUCOSE SERPL-MCNC: 98 MG/DL (ref 74–99)
HBA1C MFR BLD: 5.6 % (ref 4.2–5.6)
HDLC SERPL-MCNC: 64 MG/DL (ref 40–60)
HDLC SERPL: 3.4 (ref 0–5)
LDLC SERPL CALC-MCNC: 136.4 MG/DL (ref 0–100)
LIPID PANEL: ABNORMAL
POTASSIUM SERPL-SCNC: 4.1 MMOL/L (ref 3.5–5.5)
PROT SERPL-MCNC: 7.7 G/DL (ref 6.4–8.2)
SODIUM SERPL-SCNC: 139 MMOL/L (ref 136–145)
TRIGL SERPL-MCNC: 98 MG/DL
VLDLC SERPL CALC-MCNC: 19.6 MG/DL

## 2024-10-22 PROCEDURE — 83036 HEMOGLOBIN GLYCOSYLATED A1C: CPT

## 2024-10-22 PROCEDURE — 80053 COMPREHEN METABOLIC PANEL: CPT

## 2024-10-22 PROCEDURE — 36415 COLL VENOUS BLD VENIPUNCTURE: CPT

## 2024-10-22 PROCEDURE — 80061 LIPID PANEL: CPT

## 2024-10-29 SDOH — HEALTH STABILITY: PHYSICAL HEALTH: ON AVERAGE, HOW MANY MINUTES DO YOU ENGAGE IN EXERCISE AT THIS LEVEL?: 20 MIN

## 2024-10-29 SDOH — HEALTH STABILITY: PHYSICAL HEALTH: ON AVERAGE, HOW MANY DAYS PER WEEK DO YOU ENGAGE IN MODERATE TO STRENUOUS EXERCISE (LIKE A BRISK WALK)?: 3 DAYS

## 2024-10-29 ASSESSMENT — PATIENT HEALTH QUESTIONNAIRE - PHQ9
SUM OF ALL RESPONSES TO PHQ QUESTIONS 1-9: 1
1. LITTLE INTEREST OR PLEASURE IN DOING THINGS: SEVERAL DAYS
SUM OF ALL RESPONSES TO PHQ QUESTIONS 1-9: 1
2. FEELING DOWN, DEPRESSED OR HOPELESS: NOT AT ALL
SUM OF ALL RESPONSES TO PHQ9 QUESTIONS 1 & 2: 1

## 2024-10-29 ASSESSMENT — LIFESTYLE VARIABLES
HOW OFTEN DO YOU HAVE A DRINK CONTAINING ALCOHOL: NEVER
HOW OFTEN DO YOU HAVE SIX OR MORE DRINKS ON ONE OCCASION: 1
HOW MANY STANDARD DRINKS CONTAINING ALCOHOL DO YOU HAVE ON A TYPICAL DAY: 0
HOW OFTEN DO YOU HAVE A DRINK CONTAINING ALCOHOL: 1
HOW MANY STANDARD DRINKS CONTAINING ALCOHOL DO YOU HAVE ON A TYPICAL DAY: PATIENT DOES NOT DRINK

## 2024-10-30 ENCOUNTER — OFFICE VISIT (OUTPATIENT)
Facility: CLINIC | Age: 79
End: 2024-10-30

## 2024-10-30 VITALS
WEIGHT: 145.2 LBS | HEIGHT: 59 IN | TEMPERATURE: 98.6 F | OXYGEN SATURATION: 100 % | RESPIRATION RATE: 16 BRPM | DIASTOLIC BLOOD PRESSURE: 78 MMHG | SYSTOLIC BLOOD PRESSURE: 139 MMHG | BODY MASS INDEX: 29.27 KG/M2 | HEART RATE: 60 BPM

## 2024-10-30 DIAGNOSIS — K21.9 GASTROESOPHAGEAL REFLUX DISEASE, UNSPECIFIED WHETHER ESOPHAGITIS PRESENT: ICD-10-CM

## 2024-10-30 DIAGNOSIS — R82.90 BAD ODOR OF URINE: ICD-10-CM

## 2024-10-30 DIAGNOSIS — E78.2 MIXED HYPERLIPIDEMIA: ICD-10-CM

## 2024-10-30 DIAGNOSIS — D56.0 ALPHA THALASSEMIA (HCC): ICD-10-CM

## 2024-10-30 DIAGNOSIS — F41.9 ANXIETY: ICD-10-CM

## 2024-10-30 DIAGNOSIS — Z00.00 MEDICARE ANNUAL WELLNESS VISIT, SUBSEQUENT: Primary | ICD-10-CM

## 2024-10-30 DIAGNOSIS — I10 WHITE COAT SYNDROME WITH HYPERTENSION: ICD-10-CM

## 2024-10-30 DIAGNOSIS — G43.109 MIGRAINE WITH VERTIGO: ICD-10-CM

## 2024-10-30 LAB
BILIRUBIN, URINE, POC: NEGATIVE
BLOOD URINE, POC: NEGATIVE
GLUCOSE URINE, POC: NEGATIVE
KETONES, URINE, POC: NEGATIVE
LEUKOCYTE ESTERASE, URINE, POC: NEGATIVE
NITRITE, URINE, POC: NEGATIVE
PH, URINE, POC: 7 (ref 4.6–8)
PROTEIN,URINE, POC: NEGATIVE
SPECIFIC GRAVITY, URINE, POC: 1.01 (ref 1–1.03)
URINALYSIS CLARITY, POC: CLEAR
URINALYSIS COLOR, POC: YELLOW
UROBILINOGEN, POC: NORMAL

## 2024-10-30 NOTE — PROGRESS NOTES
\"Have you been to the ER, urgent care clinic since your last visit?  Hospitalized since your last visit?\"    NO    “Have you seen or consulted any other health care providers outside our system since your last visit?”    NO           
hypertension-  Normotensive  home readings   cont HCTZ   H/o hypotension with increase in BP meds  monitoring  Cmp prior to next visit    Anxiety  Fair control  Cont lexapro, prn vistaril (prescribed by neurologist who manages migraine headaches)     GERD unspecified esophagitis   Fairly stable  Back on prilosec due to cost of nexium, recommend trial bulk stores for better cost  H/o esophageal dilation ,  hiatal hernia  Avoid food triggers, smaller meals  GI following dr. Briceño, ongoing emy    Constipation, unspecified constipation type    Pulmonary hypertension  Finding on CT abdomen, echo 5/2023 without significant findings  Asymptomatic, per cardiology dr. SUSAN roberts    Alpha thalassemia (formerly [facility])  Reported history  Monitoring    White coat syndrome with hypertension  -     Comprehensive Metabolic Panel; Future    Bad odor of urine  Neg UA, mild \"warm\" urine symptoms  Advised monitoring  -     AMB POC URINALYSIS DIP STICK AUTO W/O MICRO          Follow-up Disposition:  Return in 6 months or sooner prn       Patient understands plan of care. Patient has provided input and agrees with goals.

## 2025-01-27 RX ORDER — HYDROXYZINE PAMOATE 50 MG/1
CAPSULE ORAL
Qty: 30 CAPSULE | Refills: 1 | Status: SHIPPED | OUTPATIENT
Start: 2025-01-27

## 2025-06-23 ENCOUNTER — HOSPITAL ENCOUNTER (OUTPATIENT)
Age: 80
Discharge: HOME | End: 2025-06-23
Payer: MEDICARE

## 2025-06-23 DIAGNOSIS — I10 WHITE COAT SYNDROME WITH HYPERTENSION: ICD-10-CM

## 2025-06-23 LAB
ALBUMIN SERPL-MCNC: 3.6 G/DL (ref 3.4–5)
ALBUMIN/GLOB SERPL: 1 (ref 0.8–1.7)
ALP SERPL-CCNC: 85 U/L (ref 45–117)
ALT SERPL-CCNC: 19 U/L (ref 10–35)
ANION GAP SERPL CALC-SCNC: 11 MMOL/L (ref 3–18)
AST SERPL-CCNC: 23 U/L (ref 10–38)
BILIRUB SERPL-MCNC: 0.6 MG/DL (ref 0.2–1)
BUN SERPL-MCNC: 10 MG/DL (ref 6–23)
BUN/CREAT SERPL: 13 (ref 12–20)
CALCIUM SERPL-MCNC: 9.2 MG/DL (ref 8.5–10.1)
CHLORIDE SERPL-SCNC: 101 MMOL/L (ref 98–107)
CO2 SERPL-SCNC: 27 MMOL/L (ref 21–32)
CREAT SERPL-MCNC: 0.74 MG/DL (ref 0.6–1.3)
GLOBULIN SER CALC-MCNC: 3.7 G/DL (ref 2–4)
GLUCOSE SERPL-MCNC: 96 MG/DL (ref 74–108)
POTASSIUM SERPL-SCNC: 4 MMOL/L (ref 3.5–5.5)
PROT SERPL-MCNC: 7.3 G/DL (ref 6.4–8.2)
SODIUM SERPL-SCNC: 139 MMOL/L (ref 136–145)

## 2025-06-23 PROCEDURE — 36415 COLL VENOUS BLD VENIPUNCTURE: CPT

## 2025-06-23 PROCEDURE — 80053 COMPREHEN METABOLIC PANEL: CPT

## 2025-06-24 ENCOUNTER — RESULTS FOLLOW-UP (OUTPATIENT)
Facility: CLINIC | Age: 80
End: 2025-06-24

## 2025-06-27 SDOH — HEALTH STABILITY: PHYSICAL HEALTH: ON AVERAGE, HOW MANY MINUTES DO YOU ENGAGE IN EXERCISE AT THIS LEVEL?: 20 MIN

## 2025-06-27 SDOH — HEALTH STABILITY: PHYSICAL HEALTH: ON AVERAGE, HOW MANY DAYS PER WEEK DO YOU ENGAGE IN MODERATE TO STRENUOUS EXERCISE (LIKE A BRISK WALK)?: 3 DAYS

## 2025-06-27 ASSESSMENT — PATIENT HEALTH QUESTIONNAIRE - PHQ9
SUM OF ALL RESPONSES TO PHQ QUESTIONS 1-9: 2
1. LITTLE INTEREST OR PLEASURE IN DOING THINGS: SEVERAL DAYS
SUM OF ALL RESPONSES TO PHQ QUESTIONS 1-9: 2
SUM OF ALL RESPONSES TO PHQ QUESTIONS 1-9: 2
2. FEELING DOWN, DEPRESSED OR HOPELESS: SEVERAL DAYS
SUM OF ALL RESPONSES TO PHQ QUESTIONS 1-9: 2

## 2025-06-27 ASSESSMENT — LIFESTYLE VARIABLES
HOW OFTEN DO YOU HAVE SIX OR MORE DRINKS ON ONE OCCASION: 1
HOW OFTEN DO YOU HAVE A DRINK CONTAINING ALCOHOL: 1
HOW MANY STANDARD DRINKS CONTAINING ALCOHOL DO YOU HAVE ON A TYPICAL DAY: 0
HOW OFTEN DO YOU HAVE A DRINK CONTAINING ALCOHOL: NEVER
HOW MANY STANDARD DRINKS CONTAINING ALCOHOL DO YOU HAVE ON A TYPICAL DAY: PATIENT DOES NOT DRINK

## 2025-07-06 SDOH — ECONOMIC STABILITY: FOOD INSECURITY: WITHIN THE PAST 12 MONTHS, YOU WORRIED THAT YOUR FOOD WOULD RUN OUT BEFORE YOU GOT MONEY TO BUY MORE.: NEVER TRUE

## 2025-07-06 SDOH — ECONOMIC STABILITY: TRANSPORTATION INSECURITY
IN THE PAST 12 MONTHS, HAS LACK OF TRANSPORTATION KEPT YOU FROM MEETINGS, WORK, OR FROM GETTING THINGS NEEDED FOR DAILY LIVING?: NO

## 2025-07-06 SDOH — ECONOMIC STABILITY: FOOD INSECURITY: WITHIN THE PAST 12 MONTHS, THE FOOD YOU BOUGHT JUST DIDN'T LAST AND YOU DIDN'T HAVE MONEY TO GET MORE.: NEVER TRUE

## 2025-07-06 SDOH — ECONOMIC STABILITY: INCOME INSECURITY: IN THE LAST 12 MONTHS, WAS THERE A TIME WHEN YOU WERE NOT ABLE TO PAY THE MORTGAGE OR RENT ON TIME?: NO

## 2025-07-06 SDOH — ECONOMIC STABILITY: TRANSPORTATION INSECURITY
IN THE PAST 12 MONTHS, HAS THE LACK OF TRANSPORTATION KEPT YOU FROM MEDICAL APPOINTMENTS OR FROM GETTING MEDICATIONS?: NO

## 2025-07-09 ENCOUNTER — OFFICE VISIT (OUTPATIENT)
Facility: CLINIC | Age: 80
End: 2025-07-09
Payer: MEDICARE

## 2025-07-09 ENCOUNTER — HOSPITAL ENCOUNTER (OUTPATIENT)
Age: 80
Discharge: HOME | End: 2025-07-09
Payer: MEDICARE

## 2025-07-09 VITALS
BODY MASS INDEX: 29.23 KG/M2 | HEIGHT: 59 IN | RESPIRATION RATE: 16 BRPM | TEMPERATURE: 99.3 F | HEART RATE: 77 BPM | OXYGEN SATURATION: 98 % | WEIGHT: 145 LBS | SYSTOLIC BLOOD PRESSURE: 172 MMHG | DIASTOLIC BLOOD PRESSURE: 80 MMHG

## 2025-07-09 DIAGNOSIS — I10 WHITE COAT SYNDROME WITH HYPERTENSION: ICD-10-CM

## 2025-07-09 DIAGNOSIS — Z00.00 MEDICARE ANNUAL WELLNESS VISIT, SUBSEQUENT: Primary | ICD-10-CM

## 2025-07-09 DIAGNOSIS — R73.01 IFG (IMPAIRED FASTING GLUCOSE): ICD-10-CM

## 2025-07-09 DIAGNOSIS — I27.20 PULMONARY HYPERTENSION (HCC): ICD-10-CM

## 2025-07-09 DIAGNOSIS — F41.9 ANXIETY: ICD-10-CM

## 2025-07-09 DIAGNOSIS — G62.9 NEUROPATHY: ICD-10-CM

## 2025-07-09 DIAGNOSIS — G43.109 MIGRAINE WITH VERTIGO: ICD-10-CM

## 2025-07-09 DIAGNOSIS — D56.0 ALPHA THALASSEMIA: ICD-10-CM

## 2025-07-09 DIAGNOSIS — K21.9 GASTROESOPHAGEAL REFLUX DISEASE, UNSPECIFIED WHETHER ESOPHAGITIS PRESENT: ICD-10-CM

## 2025-07-09 LAB
ALBUMIN SERPL-MCNC: 3.8 G/DL (ref 3.4–5)
ALBUMIN/GLOB SERPL: 1 (ref 0.8–1.7)
ALP SERPL-CCNC: 90 U/L (ref 45–117)
ALT SERPL-CCNC: 18 U/L (ref 10–35)
ANION GAP SERPL CALC-SCNC: 10 MMOL/L (ref 3–18)
AST SERPL-CCNC: 23 U/L (ref 10–38)
BASOPHILS # BLD: 0.04 K/UL (ref 0–0.1)
BASOPHILS NFR BLD: 0.8 % (ref 0–2)
BILIRUB SERPL-MCNC: 0.5 MG/DL (ref 0.2–1)
BUN SERPL-MCNC: 14 MG/DL (ref 6–23)
BUN/CREAT SERPL: 17 (ref 12–20)
CALCIUM SERPL-MCNC: 10 MG/DL (ref 8.5–10.1)
CHLORIDE SERPL-SCNC: 104 MMOL/L (ref 98–107)
CHOLEST SERPL-MCNC: 228 MG/DL
CO2 SERPL-SCNC: 29 MMOL/L (ref 21–32)
CREAT SERPL-MCNC: 0.82 MG/DL (ref 0.6–1.3)
DIFFERENTIAL METHOD BLD: ABNORMAL
EOSINOPHIL # BLD: 0.04 K/UL (ref 0–0.4)
EOSINOPHIL NFR BLD: 0.8 % (ref 0–5)
ERYTHROCYTE [DISTWIDTH] IN BLOOD BY AUTOMATED COUNT: 15.2 % (ref 11.6–14.5)
EST. AVERAGE GLUCOSE BLD GHB EST-MCNC: 114 MG/DL
FOLATE SERPL-MCNC: >20 NG/ML (ref 4.6–34.8)
GLOBULIN SER CALC-MCNC: 3.7 G/DL (ref 2–4)
GLUCOSE SERPL-MCNC: 91 MG/DL (ref 74–108)
HBA1C MFR BLD: 5.6 % (ref 4.2–5.6)
HCT VFR BLD AUTO: 45.1 % (ref 35–45)
HDLC SERPL-MCNC: 60 MG/DL (ref 40–60)
HDLC SERPL: 3.8 (ref 0–5)
HGB BLD-MCNC: 13.9 G/DL (ref 12–16)
IMM GRANULOCYTES # BLD AUTO: 0.01 K/UL (ref 0–0.04)
IMM GRANULOCYTES NFR BLD AUTO: 0.2 % (ref 0–0.5)
LDLC SERPL CALC-MCNC: 148 MG/DL (ref 0–100)
LYMPHOCYTES # BLD: 1.66 K/UL (ref 0.9–3.6)
LYMPHOCYTES NFR BLD: 31.7 % (ref 21–52)
MCH RBC QN AUTO: 24.6 PG (ref 24–34)
MCHC RBC AUTO-ENTMCNC: 30.8 G/DL (ref 31–37)
MCV RBC AUTO: 79.8 FL (ref 78–100)
MONOCYTES # BLD: 0.32 K/UL (ref 0.05–1.2)
MONOCYTES NFR BLD: 6.1 % (ref 3–10)
NEUTS SEG # BLD: 3.16 K/UL (ref 1.8–8)
NEUTS SEG NFR BLD: 60.4 % (ref 40–73)
NRBC # BLD: 0 K/UL (ref 0–0.01)
NRBC BLD-RTO: 0 PER 100 WBC
PLATELET # BLD AUTO: 169 K/UL (ref 135–420)
PMV BLD AUTO: 11.4 FL (ref 9.2–11.8)
POTASSIUM SERPL-SCNC: 5.1 MMOL/L (ref 3.5–5.5)
PROT SERPL-MCNC: 7.5 G/DL (ref 6.4–8.2)
RBC # BLD AUTO: 5.65 M/UL (ref 4.2–5.3)
SODIUM SERPL-SCNC: 143 MMOL/L (ref 136–145)
TRIGL SERPL-MCNC: 97 MG/DL (ref 0–150)
TSH, 3RD GENERATION: 3.09 UIU/ML (ref 0.27–4.2)
VIT B12 SERPL-MCNC: 652 PG/ML (ref 211–911)
VLDLC SERPL CALC-MCNC: 19 MG/DL
WBC # BLD AUTO: 5.2 K/UL (ref 4.6–13.2)

## 2025-07-09 PROCEDURE — G8427 DOCREV CUR MEDS BY ELIG CLIN: HCPCS | Performed by: FAMILY MEDICINE

## 2025-07-09 PROCEDURE — 83036 HEMOGLOBIN GLYCOSYLATED A1C: CPT

## 2025-07-09 PROCEDURE — 1090F PRES/ABSN URINE INCON ASSESS: CPT | Performed by: FAMILY MEDICINE

## 2025-07-09 PROCEDURE — 84443 ASSAY THYROID STIM HORMONE: CPT

## 2025-07-09 PROCEDURE — 1036F TOBACCO NON-USER: CPT | Performed by: FAMILY MEDICINE

## 2025-07-09 PROCEDURE — 1160F RVW MEDS BY RX/DR IN RCRD: CPT | Performed by: FAMILY MEDICINE

## 2025-07-09 PROCEDURE — G8399 PT W/DXA RESULTS DOCUMENT: HCPCS | Performed by: FAMILY MEDICINE

## 2025-07-09 PROCEDURE — 85025 COMPLETE CBC W/AUTO DIFF WBC: CPT

## 2025-07-09 PROCEDURE — 99214 OFFICE O/P EST MOD 30 MIN: CPT | Performed by: FAMILY MEDICINE

## 2025-07-09 PROCEDURE — 3079F DIAST BP 80-89 MM HG: CPT | Performed by: FAMILY MEDICINE

## 2025-07-09 PROCEDURE — 82746 ASSAY OF FOLIC ACID SERUM: CPT

## 2025-07-09 PROCEDURE — 1126F AMNT PAIN NOTED NONE PRSNT: CPT | Performed by: FAMILY MEDICINE

## 2025-07-09 PROCEDURE — 36415 COLL VENOUS BLD VENIPUNCTURE: CPT

## 2025-07-09 PROCEDURE — 80061 LIPID PANEL: CPT

## 2025-07-09 PROCEDURE — 84155 ASSAY OF PROTEIN SERUM: CPT

## 2025-07-09 PROCEDURE — 80053 COMPREHEN METABOLIC PANEL: CPT

## 2025-07-09 PROCEDURE — G0439 PPPS, SUBSEQ VISIT: HCPCS | Performed by: FAMILY MEDICINE

## 2025-07-09 PROCEDURE — 1159F MED LIST DOCD IN RCRD: CPT | Performed by: FAMILY MEDICINE

## 2025-07-09 PROCEDURE — 84165 PROTEIN E-PHORESIS SERUM: CPT

## 2025-07-09 PROCEDURE — G8417 CALC BMI ABV UP PARAM F/U: HCPCS | Performed by: FAMILY MEDICINE

## 2025-07-09 PROCEDURE — 82607 VITAMIN B-12: CPT

## 2025-07-09 PROCEDURE — 3077F SYST BP >= 140 MM HG: CPT | Performed by: FAMILY MEDICINE

## 2025-07-09 PROCEDURE — 1123F ACP DISCUSS/DSCN MKR DOCD: CPT | Performed by: FAMILY MEDICINE

## 2025-07-09 RX ORDER — HYDROXYZINE PAMOATE 50 MG/1
50 CAPSULE ORAL 3 TIMES DAILY PRN
Qty: 30 CAPSULE | Refills: 2 | Status: SHIPPED | OUTPATIENT
Start: 2025-07-09

## 2025-07-09 RX ORDER — HYDROCHLOROTHIAZIDE 25 MG/1
25 TABLET ORAL DAILY
Qty: 90 TABLET | Refills: 1 | Status: SHIPPED | OUTPATIENT
Start: 2025-07-09

## 2025-07-09 NOTE — PATIENT INSTRUCTIONS

## 2025-07-09 NOTE — CONSULTS
Session ID: [id number]  Session Duration: 21 minutes  Language: Uruguayan   ID: #[id number]   Name: Sammie Rushing

## 2025-07-09 NOTE — PROGRESS NOTES
Medicare Annual Wellness Visit    Hellen Lee is here for Medicare AWV    Assessment & Plan   Medicare annual wellness visit, subsequent     No follow-ups on file.     Subjective       Patient's complete Health Risk Assessment and screening values have been reviewed and are found in Flowsheets. The following problems were reviewed today and where indicated follow up appointments were made and/or referrals ordered.    Positive Risk Factor Screenings with Interventions:             General HRA Questions:  Select all that apply: (!) Stress  Interventions - Stress:  See AVS for additional education material       Poor Eating Habits/Diet:  Do you eat balanced/healthy meals regularly?: (!) No  Interventions:  See AVS for additional education material     Dentist Screen:  Have you seen the dentist within the past year?: (!) No    Intervention:  Advised to schedule with their dentist      Safety:  Do you have non-slip mats or non-slip surfaces or shower bars or grab bars in your shower or bathtub?: (!) No  Interventions:  See AVS for additional education material     ADL's:   Patient reports needing help with:  Select all that apply: (!) Food Preparation  Interventions:  See AVS for additional education material    Advanced Directives:  Do you have a Living Will?: (!) No    Intervention:  has NO advanced directive - information provided                     Objective   Vitals:    07/09/25 1002   BP: (!) 172/80   Pulse: 77   Resp: 16   Temp: 99.3 °F (37.4 °C)   TempSrc: Temporal   SpO2: 98%   Weight: 65.8 kg (145 lb)   Height: 1.499 m (4' 11\")      Body mass index is 29.29 kg/m².                    Allergies   Allergen Reactions    Amlodipine Swelling    Nebivolol Other (See Comments)     Leg cramps    Pravastatin Myalgia     Prior to Visit Medications    Medication Sig Taking? Authorizing Provider   hydrOXYzine pamoate (VISTARIL) 50 MG capsule TAKE 1 CAPSULE BY MOUTH THREE TIMES DAILY AS NEEDED FOR ANXIETY OF SLEEP

## 2025-07-14 LAB
ALBUMIN SERPL ELPH-MCNC: 3.7 G/DL (ref 2.9–4.4)
ALBUMIN/GLOB SERPL: 1 (ref 0.7–1.7)
ALPHA1 GLOB SERPL ELPH-MCNC: 0.2 G/DL (ref 0–0.4)
ALPHA2 GLOB SERPL ELPH-MCNC: 0.8 G/DL (ref 0.4–1)
B-GLOBULIN SERPL ELPH-MCNC: 1.1 G/DL (ref 0.7–1.3)
GAMMA GLOB SERPL ELPH-MCNC: 1.6 G/DL (ref 0.4–1.8)
GLOBULIN SER CALC-MCNC: 3.7 G/DL (ref 2.2–3.9)
M PROTEIN SERPL ELPH-MCNC: NORMAL G/DL
PROT SERPL-MCNC: 7.4 G/DL (ref 6–8.5)

## 2025-07-17 ENCOUNTER — TELEPHONE (OUTPATIENT)
Facility: CLINIC | Age: 80
End: 2025-07-17

## 2025-07-17 NOTE — TELEPHONE ENCOUNTER
Incoming call received from [facility] called and said that they cannot due by 08/28. But they can possibly do it mid September. The patient can be put on the waiting list. Please call them to inform them what Dr. Wharton would like to do.